# Patient Record
Sex: FEMALE | Race: WHITE | NOT HISPANIC OR LATINO | Employment: FULL TIME | URBAN - METROPOLITAN AREA
[De-identification: names, ages, dates, MRNs, and addresses within clinical notes are randomized per-mention and may not be internally consistent; named-entity substitution may affect disease eponyms.]

---

## 2017-04-18 ENCOUNTER — OFFICE VISIT (OUTPATIENT)
Dept: INTERNAL MEDICINE | Facility: CLINIC | Age: 37
End: 2017-04-18
Payer: COMMERCIAL

## 2017-04-18 VITALS
HEART RATE: 64 BPM | SYSTOLIC BLOOD PRESSURE: 138 MMHG | DIASTOLIC BLOOD PRESSURE: 80 MMHG | HEIGHT: 63 IN | BODY MASS INDEX: 38.87 KG/M2 | WEIGHT: 219.38 LBS

## 2017-04-18 DIAGNOSIS — F41.9 ANXIETY: ICD-10-CM

## 2017-04-18 DIAGNOSIS — Z80.0 FAMILY HISTORY OF COLON CANCER: ICD-10-CM

## 2017-04-18 DIAGNOSIS — Z80.3 FAMILY HISTORY OF BREAST CANCER IN MOTHER: ICD-10-CM

## 2017-04-18 DIAGNOSIS — R73.01 IFG (IMPAIRED FASTING GLUCOSE): ICD-10-CM

## 2017-04-18 DIAGNOSIS — R00.2 PALPITATIONS: Primary | ICD-10-CM

## 2017-04-18 DIAGNOSIS — F32.A DEPRESSION, UNSPECIFIED DEPRESSION TYPE: ICD-10-CM

## 2017-04-18 PROCEDURE — 99214 OFFICE O/P EST MOD 30 MIN: CPT | Mod: S$GLB,,, | Performed by: INTERNAL MEDICINE

## 2017-04-18 PROCEDURE — 1160F RVW MEDS BY RX/DR IN RCRD: CPT | Mod: S$GLB,,, | Performed by: INTERNAL MEDICINE

## 2017-04-18 PROCEDURE — 93010 ELECTROCARDIOGRAM REPORT: CPT | Mod: S$GLB,,, | Performed by: INTERNAL MEDICINE

## 2017-04-18 PROCEDURE — 93005 ELECTROCARDIOGRAM TRACING: CPT | Mod: S$GLB,,, | Performed by: INTERNAL MEDICINE

## 2017-04-18 PROCEDURE — 99999 PR PBB SHADOW E&M-EST. PATIENT-LVL III: CPT | Mod: PBBFAC,,, | Performed by: INTERNAL MEDICINE

## 2017-04-18 RX ORDER — BUPROPION HYDROCHLORIDE 75 MG/1
75 TABLET ORAL 2 TIMES DAILY
Qty: 60 TABLET | Refills: 11 | Status: SHIPPED | OUTPATIENT
Start: 2017-04-18 | End: 2017-09-19

## 2017-04-18 NOTE — PATIENT INSTRUCTIONS
"  Heart Palpitations    Palpitations are the feeling that your heart is beating hard, fast, or irregular. Some describe it as "pounding" or "skipped beats." Palpitations may occur in someone with heart disease, but can also occur in a healthy person.  Heart-related causes:  · Arrhythmia (a change from the heart's normal rhythm)  · Heart valve disease  · Disease of the heart muscle  · Coronary artery disease  · High blood pressure  Non-heart-related causes:  · Certain medicines (such as asthma inhalers and decongestants)  · Some herbal supplements, energy drinks and pills, and weight loss pills  · Illegal stimulant drugs (such as cocaine, crank, methamphetamine, PCP, bath salts, ecstasy)  · Caffeine, alcohol, and tobacco  · Medical conditions such as thyroid disease, anemia, anxiety, and panic disorder  Sometimes the cause can't be found.  Home care  Follow these home care tips:  · Avoid excess caffeine, alcohol, tobacco, and any stimulant drugs.  · Tell your doctor about any prescription or over-the-counter or herbal medicines you take.  Follow-up care  · Follow up with your doctor, or as advised.  Call 911  This is the fastest and safest way to get to the emergency department. The paramedics can also begin treatment on the way to the hospital, if needed.  Don't wait until your symptoms are severe to call 911. These are reasons to call 911:  · Chest pain  · Shortness of breath  · Feeling lightheaded, faint, or dizzy  · Fainting or loss of consciousness  · Very irregular heartbeat  · Rapid heartbeat that makes you uncomfortable  · Slower than usual heart rate associated with symptoms  · Slower than usual heart rate  · Chest pain with weakness, dizziness, heavy sweating, nausea, or vomiting  · Extreme drowsiness or confusion  · Weakness of an arm or leg, or on 1 side of the face  · Difficulty with speech or vision  When to seek medical advice  Get prompt medical attention if you have palpitations and any of the " following:  · Weakness  · Dizziness  · Lightheadedness  · Fainting  Date Last Reviewed: 4/27/2016 © 2000-2016 [employer]. [address] [place] [address], [place] [address]. All rights reserved. This information is not intended as a substitute for professional medical care. Always follow your healthcare professional's instructions.        Obstructive Sleep Apnea  Obstructive sleep apnea is a condition that causes your air passages to become narrowed or blocked during sleep. As a result, breathing stops for short periods. Your body wakes up enough for breathing to begin again, though you don't remember it. The cycle of stopped breathing and brief awakenings can repeat dozens of times a night. This prevents the body from getting to the deeper stages of sleep that are needed for good rest.  Signs of sleep apnea include loud snoring, noisy breathing, and gasping sounds during sleep. Daytime symptoms include waking up tired after a full night's sleep, waking up with headaches, feeling very sleepy or falling asleep during the day, and having problems with memory or concentration.  Risk factors for sleep apnea include:  · Being overweight  · Being a man, or a woman in menopause  · Smoking  · Using alcohol or sedating medications or herbs  · Having enlarged structures in the nose or throat  Home care  Lifestyle changes that can help treat snoring and sleep apnea include the following:  · If you are overweight, lose weight. Talk to your healthcare provider about a weight-loss plan for you.  · Avoid alcohol for 3 to 4 hours before bedtime. Avoid sedating medications. Ask your healthcare provider about the medications you take.  · If you smoke, talk to your healthcare provider about ways to quit.  · Sleep on your side. This can help prevent gravity from pulling relaxed throat tissues into your breathing passages.  · If you have allergies or sinus problems that block your nose, ask your healthcare provider for  help.  Follow up  Follow up with your healthcare provider as advised. A diagnosis of sleep apnea is made with a sleep study. Your healthcare provider can tell you more about this test.  When to seek medical care  Sleep apnea can make you more likely to have certain health problems. These include high blood pressure, heart attack, stroke, and sexual dysfunction. If you have sleep apnea, talk to your healthcare provider about the best treatments for you.  Date Last Reviewed: 5/3/2015  © 2889-0419 [employer]. [address] [place] [id number]. All rights reserved. This information is not intended as a substitute for professional medical care. Always follow your healthcare professional's instructions.        What Are Snoring and Obstructive Sleep Apnea?  If youve ever had a stuffed-up nose, you know the feeling of trying to breathe through a very narrow passageway. This is what happens in your throat when you snore. While you sleep, structures in your throat partially block your air passage, making the passage narrow and hard to breathe through. If the entire passage becomes blocked and you cant breathe at all, you have sleep apnea.     Air moves freely through the nose, mouth and throat.   Snoring  If your throat structures are too large or the muscles relax too much during sleep, the air passage may be partially blocked. As air from the nose or mouth passes around this blockage, the throat structures vibrate, causing the familiar sound of snoring. At times, this sound can be so loud that snorers wake up others, or even themselves, during the night. Snoring gets worse as more and more of the air passage is blocked.     Air is blocked in the back of the mouth and throat.   Obstructive sleep apnea  If the structures completely block the throat, air cant flow to the lungs at all. This is called apnea (meaning no breathing). Since the lungs arent getting fresh air, the brain tells the body to  wake up just enough to tighten the muscles and unblock the air passage. With a loud gasp, breathing begins again. This process may be repeated over and over again throughout the night, making your sleep fragmented with a lighter stage of sleep. Even though you do not remember waking up many times during the night to a lighter sleep, you feel tired the next day. The lack of sleep and fresh air can also strain your lungs, heart, and other organs, leading to problems such as high blood pressure, heart attack, or stroke.     Air may not be able to move freely past a deviated septum or swollen turbinates.   Problems in the nose and jaw  Problems in the structure of the nose may obstruct breathing. A crooked (deviated) septum or swollen turbinates can make snoring worse or lead to apnea. Also, a receding jaw may make the tongue sit too far back, so its more likely to block the airway when youre asleep.        Date Last Reviewed: 7/18/2015  © 9964-3636 [employer]. [address] [place] [address]. All rights reserved. This information is not intended as a substitute for professional medical care. Always follow your healthcare professional's instructions.        Snoring and Sleep Apnea: Notes for a Partner  Snoring and sleep apnea affect your life, as well as your partners. You can help in the treatment of the problem. Be supportive. Encourage your partner both to get treatment and to make the adjustments needed to follow through.    Adjusting to changes  Your partners treatment may involve making changes to certain life habits. You can help your partner make and stick with these changes. For example:  · Support and even join your partners exercise program.  · Be supportive if your partner gets CPAP (continuous positive airway pressure). He or she may feel self-conscious at first. Remind your partner to expect adjustments to CPAP before it feels just right.  · Consider joining a snoring and  sleep apnea support group.  Go along to see the healthcare provider  You can give the healthcare provider the best account of your partners nighttime breathing and snoring patterns. Try to go along to healthcare providers appointments. If you cant go, write notes for your partner to give to the healthcare provider. Describe your partners snoring and sleep breathing patterns in detail.  Tips for sleeping with a snorer  Until treatment takes care of your partners snoring:  · Try to go to bed first. It may help if youre already asleep when your partner starts to snore.  · Wear earplugs to bed. A fan or other source of background noise may also help drown out snoring.   Date Last Reviewed: 8/10/2015  © 3026-3500 [employer]. [address] [place] [address], [place] [address]. All rights reserved. This information is not intended as a substitute for professional medical care. Always follow your healthcare professional's instructions.        Bupropion tablets (Depression/Mood Disorders)  What is this medicine?  BUPROPION (byoo PROE pee on) is used to treat depression.  How should I use this medicine?  Take this medicine by mouth with a glass of water. Follow the directions on the prescription label. You can take it with or without food. If it upsets your stomach, take it with food. Take your medicine at regular intervals. Do not take your medicine more often than directed. Do not stop taking this medicine suddenly except upon the advice of your doctor. Stopping this medicine too quickly may cause serious side effects or your condition may worsen.  A special MedGuide will be given to you by the pharmacist with each prescription and refill. Be sure to read this information carefully each time.  Talk to your pediatrician regarding the use of this medicine in children. Special care may be needed.  What side effects may I notice from receiving this medicine?  Side effects that you should report to your doctor or  health care professional as soon as possible:  · allergic reactions like skin rash, itching or hives, swelling of the face, lips, or tongue  · breathing problems  · changes in vision  · confusion  · fast or irregular heartbeat  · hallucinations  · increased blood pressure  · redness, blistering, peeling or loosening of the skin, including inside the mouth  · seizures  · suicidal thoughts or other mood changes  · unusually weak or tired  · vomiting  Side effects that usually do not require medical attention (report to your doctor or health care professional if they continue or are bothersome):  · change in sex drive or performance  · constipation  · headache  · loss of appetite  · nausea  · tremors  · weight loss  What may interact with this medicine?  Do not take this medicine with any of the following medications:  · linezolid  · MAOIs like Azilect, Carbex, Eldepryl, Marplan, Nardil, and Parnate  · methylene blue (injected into a vein)  · other medicines that contain bupropion like Zyban  This medicine may also interact with the following medications:  · alcohol  · certain medicines for anxiety or sleep  · certain medicines for blood pressure like metoprolol, propranolol  · certain medicines for depression or psychotic disturbances  · certain medicines for HIV or AIDS like efavirenz, lopinavir, nelfinavir, ritonavir  · certain medicines for irregular heart beat like propafenone, flecainide  · certain medicines for Parkinson's disease like amantadine, levodopa  · certain medicines for seizures like carbamazepine, phenytoin, phenobarbital  · cimetidine  · clopidogrel  · cyclophosphamide  · furazolidone  · isoniazid  · nicotine  · orphenadrine  · procarbazine  · steroid medicines like prednisone or cortisone  · stimulant medicines for attention disorders, weight loss, or to stay awake  · tamoxifen  · theophylline  · thiotepa  · ticlopidine  · tramadol  · warfarin  What if I miss a dose?  If you miss a dose, take it as  soon as you can. If it is less than four hours to your next dose, take only that dose and skip the missed dose. Do not take double or extra doses.  Where should I keep my medicine?  Keep out of the reach of children.  Store at room temperature between 15 and 25 degrees C (59 and 77 degrees F), away from direct sunlight and moisture. Keep tightly closed. Throw away any unused medicine after the expiration date.  What should I tell my health care provider before I take this medicine?  They need to know if you have any of these conditions:  · an eating disorder, such as anorexia or bulimia  · bipolar disorder or psychosis  · diabetes or high blood sugar, treated with medication  · glaucoma  · heart disease, previous heart attack, or irregular heart beat  · head injury or brain tumor  · high blood pressure  · kidney or liver disease  · seizures  · suicidal thoughts or a previous suicide attempt  · Tourette's syndrome  · weight loss  · an unusual or allergic reaction to bupropion, other medicines, foods, dyes, or preservatives  · breast-feeding  · pregnant or trying to become pregnant  What should I watch for while using this medicine?  Tell your doctor if your symptoms do not get better or if they get worse. Visit your doctor or health care professional for regular checks on your progress. Because it may take several weeks to see the full effects of this medicine, it is important to continue your treatment as prescribed by your doctor.  Patients and their families should watch out for new or worsening thoughts of suicide or depression. Also watch out for sudden changes in feelings such as feeling anxious, agitated, panicky, irritable, hostile, aggressive, impulsive, severely restless, overly excited and hyperactive, or not being able to sleep. If this happens, especially at the beginning of treatment or after a change in dose, call your health care professional.  Avoid alcoholic drinks while taking this medicine.  Drinking excessive alcoholic beverages, using sleeping or anxiety medicines, or quickly stopping the use of these agents while taking this medicine may increase your risk for a seizure.  Do not drive or use heavy machinery until you know how this medicine affects you. This medicine can impair your ability to perform these tasks.  Do not take this medicine close to bedtime. It may prevent you from sleeping.  Your mouth may get dry. Chewing sugarless gum or sucking hard candy, and drinking plenty of water may help. Contact your doctor if the problem does not go away or is severe.  Date Last Reviewed:   NOTE:This sheet is a summary. It may not cover all possible information. If you have questions about this medicine, talk to your doctor, pharmacist, or health care provider. Copyright© 2016 [employer]

## 2017-04-18 NOTE — PROGRESS NOTES
"Subjective:       Patient ID: Zo Martínez is a 37 y.o. female.    Chief Complaint:  Palpitations/anxiety    Some palpitations of late.  A lot of stress, some depression.    Sometimes feels like heart is "flopping."   This can occur at random times.  May occur daily.  This seems to go away by itself.    Tourette's dx younger, some anxiety and OCD.  Had taken Zoloft in the past and this helped with panic attacks.   Right now does not feel panicky, main sx is sadness and some fatigue.    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  Not happy at work.   Tired, cries daily.  No SI or HI.    Some acne; regular periods.      Sleeps OK- may get up 1-2 x nightly, tosses and turns.   Wakes up tired.    says she snores occasionally but not loud.    IFG.  Also saw family doctor and A1c about 5.9 a few months ago.   Gestational DM with both pregnancies.    Patient Active Problem List   Diagnosis    Transient elevated blood pressure    Anxiety    Obesity, Class I, BMI 30-34.9    Gestational diabetes mellitus    B12 deficiency    Tourette's syndrome    Mild vitamin D deficiency    Family history of colon cancer: father age 55    Family history of breast cancer in mother    Depression    IFG (impaired fasting glucose)     HPI  Review of Systems   Constitutional: Positive for fatigue and unexpected weight change. Negative for activity change, appetite change, chills and fever.   HENT: Negative for ear discharge, nosebleeds, sinus pressure and sore throat.    Eyes: Negative for visual disturbance.   Respiratory: Negative for apnea, cough, chest tightness, shortness of breath and wheezing.    Cardiovascular: Positive for palpitations. Negative for chest pain and leg swelling.   Gastrointestinal: Negative for abdominal distention, abdominal pain, anal bleeding, blood in stool, constipation, diarrhea, nausea and vomiting.   Genitourinary: Negative for dysuria, frequency, hematuria and vaginal bleeding.   Musculoskeletal: Negative for " gait problem, joint swelling and myalgias.   Skin: Negative for rash.   Neurological: Negative for dizziness, tremors, weakness, light-headedness and headaches.   Hematological: Negative for adenopathy. Does not bruise/bleed easily.   Psychiatric/Behavioral: Positive for dysphoric mood. Negative for confusion, hallucinations, sleep disturbance and suicidal ideas. The patient is nervous/anxious.        Objective:      Physical Exam   Constitutional: She is oriented to person, place, and time. She appears well-developed and well-nourished.   HENT:   Head: Normocephalic and atraumatic.   Right Ear: External ear normal.   Left Ear: External ear normal.   Nose: Nose normal.   Mouth/Throat: Oropharynx is clear and moist. No oropharyngeal exudate.   Eyes: Conjunctivae and EOM are normal. No scleral icterus.   Neck: Normal range of motion. Neck supple. No JVD present. No thyromegaly present.   Cardiovascular: Normal rate, regular rhythm, normal heart sounds and intact distal pulses.  Exam reveals no gallop.    No murmur heard.  Pulmonary/Chest: Effort normal and breath sounds normal. No respiratory distress. She has no wheezes. She exhibits no tenderness.   Abdominal: Soft. Bowel sounds are normal. She exhibits no distension and no mass. There is no tenderness. There is no rebound and no guarding.   Musculoskeletal: Normal range of motion. She exhibits no edema or tenderness.   Lymphadenopathy:     She has no cervical adenopathy.   Neurological: She is alert and oriented to person, place, and time. She displays normal reflexes. No cranial nerve deficit. Coordination normal.   Skin: Skin is warm. No rash noted. No erythema.   Psychiatric: She has a normal mood and affect. Her behavior is normal. Judgment and thought content normal.   Nursing note and vitals reviewed.      Assessment:       1. Palpitations    2. Family history of colon cancer: father age 55    3. Family history of breast cancer in mother    4. IFG (impaired  fasting glucose)    5. Anxiety    6. Depression, unspecified depression type        Plan:         Palpitations  -     EKG 12-lead- done in office, NSR no ischemia  -     Holter monitor - 48 hour  -     CBC auto differential; Future; Expected date: 4/18/17  -     Comprehensive metabolic panel; Future; Expected date: 4/18/17  -     TSH; Future; Expected date: 4/18/17    Family history of colon cancer: father age 55: discussed screening age 40-45    Family history of breast cancer in mother: discussed screening    IFG (impaired fasting glucose)  -     Hemoglobin A1c; Future; Expected date: 4/18/17  -     Lipid panel; Future; Expected date: 4/18/17  -     Insulin, random; Future; Expected date: 4/18/17    Depression/anxiety:  Able to contract for safety.  Sleep hygiene, exercise reviewed.  Cautions and side effects of medication discussed.  Follow-up in one month, sooner with problems prior    Other orders  -     buPROPion (WELLBUTRIN) 75 MG tablet; Take 1 tablet (75 mg total) by mouth 2 (two) times daily.  Dispense: 60 tablet; Refill: 11    I will review all studies and determine further tx depending on findings    Patient evaluated for over 30 minutes with this appoinment, including diagnostic testing and treatment.  All questions answered,  chart reviewed and care coordinated.

## 2017-04-18 NOTE — MR AVS SNAPSHOT
[facility]  [address]  [place] [address]  Phone: [phone]  Fax: [phone]                  Zo Martínez   2017 11:30 AM   Office Visit    Description:  Female : 1980   Provider:  Guillerimna Zuniga MD   Department:  [facility]           Diagnoses this Visit        Comments    Palpitations    -  Primary     Family history of colon cancer         Family history of breast cancer in mother         IFG (impaired fasting glucose)                To Do List           Goals (5 Years of Data)     None       These Medications        Disp Refills Start End    buPROPion (WELLBUTRIN) 75 MG tablet 60 tablet 11 2017    Take 1 tablet (75 mg total) by mouth 2 (two) times daily. - Oral    Pharmacy: [id number] - [place], [place] - [address] AT [facility] Ph #: [phone]         [facility] On Call     [facility] On Call Nurse Care Line -  Assistance  Unless otherwise directed by your provider, please contact [facility], our nurse care line that is available for  assistance.     Registered nurses in the [facility] provide: appointment scheduling, clinical advisement, health education, and other advisory services.  Call: [phone] (toll free)               Medications           Message regarding Medications     Verify the changes and/or additions to your medication regime listed below are the same as discussed with your clinician today.  If any of these changes or additions are incorrect, please notify your healthcare provider.        START taking these NEW medications        Refills    buPROPion (WELLBUTRIN) 75 MG tablet 11    Sig: Take 1 tablet (75 mg total) by mouth 2 (two) times daily.    Class: Normal    Route: Oral           Verify that the below list of medications is an accurate representation of the medications you are currently taking.  If none reported, the list may be blank. If  "incorrect, please contact your healthcare provider. Carry this list with you in case of emergency.           Current Medications     buPROPion (WELLBUTRIN) 75 MG tablet Take 1 tablet (75 mg total) by mouth 2 (two) times daily.           Clinical Reference Information           Your Vitals Were     BP Pulse Height Weight Last Period BMI    138/80 64 5' 3" (1.6 m) 99.5 kg (219 lb 5.7 oz) (Exact Date) 38.86 kg/m2      Blood Pressure          Most Recent Value    BP  138/80 [Simultaneous filing. User may not have seen previous data.]      Allergies as of 4/18/2017     No Known Allergies      Immunizations Administered on Date of Encounter - 4/18/2017     None      Orders Placed During Today's Visit      Normal Orders This Visit    EKG 12-lead     Holter monitor - 48 hour     Future Labs/Procedures Expected by Expires    CBC auto differential  4/18/2017 4/18/2018    Comprehensive metabolic panel  4/18/2017 4/18/2018    Hemoglobin A1c  4/18/2017 4/18/2018    Insulin, random  4/18/2017 6/17/2018    Lipid panel  4/18/2017 4/18/2018    TSH  4/18/2017 4/18/2018      Instructions      Heart Palpitations    Palpitations are the feeling that your heart is beating hard, fast, or irregular. Some describe it as "pounding" or "skipped beats." Palpitations may occur in someone with heart disease, but can also occur in a healthy person.  Heart-related causes:  · Arrhythmia (a change from the heart's normal rhythm)  · Heart valve disease  · Disease of the heart muscle  · Coronary artery disease  · High blood pressure  Non-heart-related causes:  · Certain medicines (such as asthma inhalers and decongestants)  · Some herbal supplements, energy drinks and pills, and weight loss pills  · Illegal stimulant drugs (such as cocaine, crank, methamphetamine, PCP, bath salts, ecstasy)  · Caffeine, alcohol, and tobacco  · Medical conditions such as thyroid disease, anemia, anxiety, and panic disorder  Sometimes the cause can't be found.  Home " care  Follow these home care tips:  · Avoid excess caffeine, alcohol, tobacco, and any stimulant drugs.  · Tell your doctor about any prescription or over-the-counter or herbal medicines you take.  Follow-up care  · Follow up with your doctor, or as advised.  Call 911  This is the fastest and safest way to get to the emergency department. The paramedics can also begin treatment on the way to the hospital, if needed.  Don't wait until your symptoms are severe to call 911. These are reasons to call 911:  · Chest pain  · Shortness of breath  · Feeling lightheaded, faint, or dizzy  · Fainting or loss of consciousness  · Very irregular heartbeat  · Rapid heartbeat that makes you uncomfortable  · Slower than usual heart rate associated with symptoms  · Slower than usual heart rate  · Chest pain with weakness, dizziness, heavy sweating, nausea, or vomiting  · Extreme drowsiness or confusion  · Weakness of an arm or leg, or on 1 side of the face  · Difficulty with speech or vision  When to seek medical advice  Get prompt medical attention if you have palpitations and any of the following:  · Weakness  · Dizziness  · Lightheadedness  · Fainting  Date Last Reviewed: 4/27/2016  © 8289-8339 [employer]. [address] [place] [address], [place] [address]. All rights reserved. This information is not intended as a substitute for professional medical care. Always follow your healthcare professional's instructions.        Obstructive Sleep Apnea  Obstructive sleep apnea is a condition that causes your air passages to become narrowed or blocked during sleep. As a result, breathing stops for short periods. Your body wakes up enough for breathing to begin again, though you don't remember it. The cycle of stopped breathing and brief awakenings can repeat dozens of times a night. This prevents the body from getting to the deeper stages of sleep that are needed for good rest.  Signs of sleep apnea include loud snoring, noisy  breathing, and gasping sounds during sleep. Daytime symptoms include waking up tired after a full night's sleep, waking up with headaches, feeling very sleepy or falling asleep during the day, and having problems with memory or concentration.  Risk factors for sleep apnea include:  · Being overweight  · Being a man, or a woman in menopause  · Smoking  · Using alcohol or sedating medications or herbs  · Having enlarged structures in the nose or throat  Home care  Lifestyle changes that can help treat snoring and sleep apnea include the following:  · If you are overweight, lose weight. Talk to your healthcare provider about a weight-loss plan for you.  · Avoid alcohol for 3 to 4 hours before bedtime. Avoid sedating medications. Ask your healthcare provider about the medications you take.  · If you smoke, talk to your healthcare provider about ways to quit.  · Sleep on your side. This can help prevent gravity from pulling relaxed throat tissues into your breathing passages.  · If you have allergies or sinus problems that block your nose, ask your healthcare provider for help.  Follow up  Follow up with your healthcare provider as advised. A diagnosis of sleep apnea is made with a sleep study. Your healthcare provider can tell you more about this test.  When to seek medical care  Sleep apnea can make you more likely to have certain health problems. These include high blood pressure, heart attack, stroke, and sexual dysfunction. If you have sleep apnea, talk to your healthcare provider about the best treatments for you.  Date Last Reviewed: 5/3/2015  © 6426-6903 [employer]. [address] [place] [address], [place] [address]. All rights reserved. This information is not intended as a substitute for professional medical care. Always follow your healthcare professional's instructions.        What Are Snoring and Obstructive Sleep Apnea?  If youve ever had a stuffed-up nose, you know the feeling of trying to breathe  through a very narrow passageway. This is what happens in your throat when you snore. While you sleep, structures in your throat partially block your air passage, making the passage narrow and hard to breathe through. If the entire passage becomes blocked and you cant breathe at all, you have sleep apnea.     Air moves freely through the nose, mouth and throat.   Snoring  If your throat structures are too large or the muscles relax too much during sleep, the air passage may be partially blocked. As air from the nose or mouth passes around this blockage, the throat structures vibrate, causing the familiar sound of snoring. At times, this sound can be so loud that snorers wake up others, or even themselves, during the night. Snoring gets worse as more and more of the air passage is blocked.     Air is blocked in the back of the mouth and throat.   Obstructive sleep apnea  If the structures completely block the throat, air cant flow to the lungs at all. This is called apnea (meaning no breathing). Since the lungs arent getting fresh air, the brain tells the body to wake up just enough to tighten the muscles and unblock the air passage. With a loud gasp, breathing begins again. This process may be repeated over and over again throughout the night, making your sleep fragmented with a lighter stage of sleep. Even though you do not remember waking up many times during the night to a lighter sleep, you feel tired the next day. The lack of sleep and fresh air can also strain your lungs, heart, and other organs, leading to problems such as high blood pressure, heart attack, or stroke.     Air may not be able to move freely past a deviated septum or swollen turbinates.   Problems in the nose and jaw  Problems in the structure of the nose may obstruct breathing. A crooked (deviated) septum or swollen turbinates can make snoring worse or lead to apnea. Also, a receding jaw may make the tongue sit too far back, so its more  likely to block the airway when youre asleep.        Date Last Reviewed: 7/18/2015  © 5806-0408 [employer]. [address] [place] [address]. All rights reserved. This information is not intended as a substitute for professional medical care. Always follow your healthcare professional's instructions.        Snoring and Sleep Apnea: Notes for a Partner  Snoring and sleep apnea affect your life, as well as your partners. You can help in the treatment of the problem. Be supportive. Encourage your partner both to get treatment and to make the adjustments needed to follow through.    Adjusting to changes  Your partners treatment may involve making changes to certain life habits. You can help your partner make and stick with these changes. For example:  · Support and even join your partners exercise program.  · Be supportive if your partner gets CPAP (continuous positive airway pressure). He or she may feel self-conscious at first. Remind your partner to expect adjustments to CPAP before it feels just right.  · Consider joining a snoring and sleep apnea support group.  Go along to see the healthcare provider  You can give the healthcare provider the best account of your partners nighttime breathing and snoring patterns. Try to go along to healthcare providers appointments. If you cant go, write notes for your partner to give to the healthcare provider. Describe your partners snoring and sleep breathing patterns in detail.  Tips for sleeping with a snorer  Until treatment takes care of your partners snoring:  · Try to go to bed first. It may help if youre already asleep when your partner starts to snore.  · Wear earplugs to bed. A fan or other source of background noise may also help drown out snoring.   Date Last Reviewed: 8/10/2015  © 4523-5634 [employer]. [address] [place] [address]. All rights reserved. This information is not intended as a substitute for  professional medical care. Always follow your healthcare professional's instructions.        Bupropion tablets (Depression/Mood Disorders)  What is this medicine?  BUPROPION (byoo PROE pee on) is used to treat depression.  How should I use this medicine?  Take this medicine by mouth with a glass of water. Follow the directions on the prescription label. You can take it with or without food. If it upsets your stomach, take it with food. Take your medicine at regular intervals. Do not take your medicine more often than directed. Do not stop taking this medicine suddenly except upon the advice of your doctor. Stopping this medicine too quickly may cause serious side effects or your condition may worsen.  A special MedGuide will be given to you by the pharmacist with each prescription and refill. Be sure to read this information carefully each time.  Talk to your pediatrician regarding the use of this medicine in children. Special care may be needed.  What side effects may I notice from receiving this medicine?  Side effects that you should report to your doctor or health care professional as soon as possible:  · allergic reactions like skin rash, itching or hives, swelling of the face, lips, or tongue  · breathing problems  · changes in vision  · confusion  · fast or irregular heartbeat  · hallucinations  · increased blood pressure  · redness, blistering, peeling or loosening of the skin, including inside the mouth  · seizures  · suicidal thoughts or other mood changes  · unusually weak or tired  · vomiting  Side effects that usually do not require medical attention (report to your doctor or health care professional if they continue or are bothersome):  · change in sex drive or performance  · constipation  · headache  · loss of appetite  · nausea  · tremors  · weight loss  What may interact with this medicine?  Do not take this medicine with any of the following medications:  · linezolid  · MAOIs like Azilect, Carbex,  Eldepryl, Marplan, Nardil, and Parnate  · methylene blue (injected into a vein)  · other medicines that contain bupropion like Zyban  This medicine may also interact with the following medications:  · alcohol  · certain medicines for anxiety or sleep  · certain medicines for blood pressure like metoprolol, propranolol  · certain medicines for depression or psychotic disturbances  · certain medicines for HIV or AIDS like efavirenz, lopinavir, nelfinavir, ritonavir  · certain medicines for irregular heart beat like propafenone, flecainide  · certain medicines for Parkinson's disease like amantadine, levodopa  · certain medicines for seizures like carbamazepine, phenytoin, phenobarbital  · cimetidine  · clopidogrel  · cyclophosphamide  · furazolidone  · isoniazid  · nicotine  · orphenadrine  · procarbazine  · steroid medicines like prednisone or cortisone  · stimulant medicines for attention disorders, weight loss, or to stay awake  · tamoxifen  · theophylline  · thiotepa  · ticlopidine  · tramadol  · warfarin  What if I miss a dose?  If you miss a dose, take it as soon as you can. If it is less than four hours to your next dose, take only that dose and skip the missed dose. Do not take double or extra doses.  Where should I keep my medicine?  Keep out of the reach of children.  Store at room temperature between 15 and 25 degrees C (59 and 77 degrees F), away from direct sunlight and moisture. Keep tightly closed. Throw away any unused medicine after the expiration date.  What should I tell my health care provider before I take this medicine?  They need to know if you have any of these conditions:  · an eating disorder, such as anorexia or bulimia  · bipolar disorder or psychosis  · diabetes or high blood sugar, treated with medication  · glaucoma  · heart disease, previous heart attack, or irregular heart beat  · head injury or brain tumor  · high blood pressure  · kidney or liver disease  · seizures  · suicidal  thoughts or a previous suicide attempt  · Tourette's syndrome  · weight loss  · an unusual or allergic reaction to bupropion, other medicines, foods, dyes, or preservatives  · breast-feeding  · pregnant or trying to become pregnant  What should I watch for while using this medicine?  Tell your doctor if your symptoms do not get better or if they get worse. Visit your doctor or health care professional for regular checks on your progress. Because it may take several weeks to see the full effects of this medicine, it is important to continue your treatment as prescribed by your doctor.  Patients and their families should watch out for new or worsening thoughts of suicide or depression. Also watch out for sudden changes in feelings such as feeling anxious, agitated, panicky, irritable, hostile, aggressive, impulsive, severely restless, overly excited and hyperactive, or not being able to sleep. If this happens, especially at the beginning of treatment or after a change in dose, call your health care professional.  Avoid alcoholic drinks while taking this medicine. Drinking excessive alcoholic beverages, using sleeping or anxiety medicines, or quickly stopping the use of these agents while taking this medicine may increase your risk for a seizure.  Do not drive or use heavy machinery until you know how this medicine affects you. This medicine can impair your ability to perform these tasks.  Do not take this medicine close to bedtime. It may prevent you from sleeping.  Your mouth may get dry. Chewing sugarless gum or sucking hard candy, and drinking plenty of water may help. Contact your doctor if the problem does not go away or is severe.  Date Last Reviewed:   NOTE:This sheet is a summary. It may not cover all possible information. If you have questions about this medicine, talk to your doctor, pharmacist, or health care provider. Copyright© 2016 [employer]             Language Assistance Services     ATTENTION:  Language assistance services are available, free of charge. Please call [phone].      ATENCIÓN: Si habla reeseañol, tiene a tracy disposición servicios gratuitos de asistencia lingüística. Llame al [phone].     CHÚ Ý: N?u b?n nói Ti?ng Vi?t, có các d?ch v? h? tr? ngôn ng? mi?n phí dành cho b?n. G?i s? [phone].         [facility] complies with applicable Federal civil rights laws and does not discriminate on the basis of race, color, national origin, age, disability, or sex.

## 2017-04-24 ENCOUNTER — APPOINTMENT (OUTPATIENT)
Dept: ELECTROPHYSIOLOGY | Facility: CLINIC | Age: 37
End: 2017-04-24
Payer: COMMERCIAL

## 2017-04-24 ENCOUNTER — PATIENT MESSAGE (OUTPATIENT)
Dept: INTERNAL MEDICINE | Facility: CLINIC | Age: 37
End: 2017-04-24

## 2017-04-24 PROCEDURE — 93224 XTRNL ECG REC UP TO 48 HRS: CPT | Mod: S$GLB,,, | Performed by: INTERNAL MEDICINE

## 2017-04-27 ENCOUNTER — PATIENT MESSAGE (OUTPATIENT)
Dept: INTERNAL MEDICINE | Facility: CLINIC | Age: 37
End: 2017-04-27

## 2017-04-27 ENCOUNTER — TELEPHONE (OUTPATIENT)
Dept: INTERNAL MEDICINE | Facility: CLINIC | Age: 37
End: 2017-04-27

## 2017-04-27 DIAGNOSIS — I49.3 PVC'S (PREMATURE VENTRICULAR CONTRACTIONS): Primary | ICD-10-CM

## 2017-04-27 NOTE — TELEPHONE ENCOUNTER
Please let her know that her 24-hour Holter was acceptable but she does have a lot of premature beats.  These do not look worrisome, but I am sure they're causing her symptoms.  I would like for her to be seen in the arrhythmia clinic and I have placed a referral for this.  Please assist with appointment and let me know when it is scheduled.  Thank you

## 2017-04-28 NOTE — TELEPHONE ENCOUNTER
Spoke to pt and advised of results. Scheduled pt appt for 5/2/17 with Dr. Martínez in arrhythmia.

## 2017-04-29 DIAGNOSIS — I49.3 PVCS (PREMATURE VENTRICULAR CONTRACTIONS): Primary | ICD-10-CM

## 2017-05-02 ENCOUNTER — CLINICAL SUPPORT (OUTPATIENT)
Dept: ELECTROPHYSIOLOGY | Facility: CLINIC | Age: 37
End: 2017-05-02
Payer: COMMERCIAL

## 2017-05-02 ENCOUNTER — HOSPITAL ENCOUNTER (OUTPATIENT)
Dept: CARDIOLOGY | Facility: CLINIC | Age: 37
Discharge: HOME | End: 2017-05-02
Payer: COMMERCIAL

## 2017-05-02 ENCOUNTER — INITIAL CONSULT (OUTPATIENT)
Dept: ELECTROPHYSIOLOGY | Facility: CLINIC | Age: 37
End: 2017-05-02
Payer: COMMERCIAL

## 2017-05-02 VITALS
DIASTOLIC BLOOD PRESSURE: 94 MMHG | HEIGHT: 64 IN | BODY MASS INDEX: 36.96 KG/M2 | SYSTOLIC BLOOD PRESSURE: 146 MMHG | HEART RATE: 73 BPM | WEIGHT: 216.5 LBS

## 2017-05-02 DIAGNOSIS — R00.2 PALPITATIONS: ICD-10-CM

## 2017-05-02 DIAGNOSIS — I49.1 PREMATURE ATRIAL CONTRACTIONS: Chronic | ICD-10-CM

## 2017-05-02 DIAGNOSIS — I49.3 PVCS (PREMATURE VENTRICULAR CONTRACTIONS): ICD-10-CM

## 2017-05-02 DIAGNOSIS — R07.9 CHEST PAIN, UNSPECIFIED TYPE: Primary | ICD-10-CM

## 2017-05-02 PROCEDURE — 99999 PR PBB SHADOW E&M-EST. PATIENT-LVL III: CPT | Mod: PBBFAC,,, | Performed by: INTERNAL MEDICINE

## 2017-05-02 PROCEDURE — 0296T HOLTER MONITOR - 3-14 DAY ADULT: CPT | Mod: ,,, | Performed by: INTERNAL MEDICINE

## 2017-05-02 PROCEDURE — 93000 ELECTROCARDIOGRAM COMPLETE: CPT | Mod: S$GLB,,, | Performed by: INTERNAL MEDICINE

## 2017-05-02 PROCEDURE — 0298T HOLTER MONITOR - 3-14 DAY ADULT: CPT | Mod: ,,, | Performed by: INTERNAL MEDICINE

## 2017-05-02 PROCEDURE — 99204 OFFICE O/P NEW MOD 45 MIN: CPT | Mod: S$GLB,,, | Performed by: INTERNAL MEDICINE

## 2017-05-02 PROCEDURE — 1160F RVW MEDS BY RX/DR IN RCRD: CPT | Mod: S$GLB,,, | Performed by: INTERNAL MEDICINE

## 2017-05-02 NOTE — PROGRESS NOTES
"Subjective:    Patient ID:  Zo Martínez is a 37 y.o. female who presents for evaluation of Palpitations    Referring Physician: Guillermina Zuniga MD    HPI  I had the pleasure of seeing Mrs. Martínez today in our electrophysiology clinic in consultation for her palpitations and chest pain.  As you are aware she is a pleasant 37 year-old woman with long-history of palpitations, depression, and obesity.  She has noted for years to have episodes of skipped beats that occur without an identifiable trigger.  Some days are better than other days.  They are associated with a "sinking" or "dropping" sensation of her heart in her chest.  She has cut out the caffeine from her diet which has not effected her symptoms.  She also has occasional episodes of more sustained palpitations that last for a bit but <30 seconds.  Then recently she had an episode of squeezing/pushing chest pressure in the center of her chest while driving with associated shortness of breath that lasted for ~30 seconds.  A 48 hour holter monitor was performed.  Day 1 revealed 1 PAC.  Day 2 revealed 1108 PACs.  She noted that neither day was truly representative of her typical symptom burden.  Moreover she did not have any of the more "sustained" episodes while wearing the monitor.  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 She reports having early blood pressure in her family but does not believe anyone had a heart attack at a young age. She is a lifelong non-smoker. She is not physically active however wants to start exercising and lose weight.    An echocardiogram performed in 2013 was normal.  ECHO 11/2013 CONCLUSIONS     1 - Normal left ventricular systolic function (EF 60-65%).     2 - Normal right ventricular systolic function .     3 - Normal left ventricular diastolic function.    My interpretation of today's in clinic electrocardiogram is sinus rhythm with a rate of 73 bpm, normal intervals/qrs, and no " ectopy.    Review of Systems   Constitution: Negative. Negative for weakness and malaise/fatigue.   HENT: Negative.  Negative for congestion and headaches.    Eyes: Negative.    Cardiovascular: Positive for chest pain (per HPI), irregular heartbeat and palpitations. Negative for dyspnea on exertion, leg swelling, near-syncope, orthopnea, paroxysmal nocturnal dyspnea and syncope.   Respiratory: Negative.    Endocrine: Negative.    Hematologic/Lymphatic: Negative.    Skin: Negative.    Musculoskeletal: Negative.    Genitourinary: Negative.    Neurological: Negative.    Psychiatric/Behavioral: Negative.         Objective:    Physical Exam   Constitutional: She is oriented to person, place, and time. She appears well-developed and well-nourished. No distress.   HENT:   Head: Normocephalic and atraumatic.   Eyes: Conjunctivae are normal. Right eye exhibits no discharge. Left eye exhibits no discharge.   Neck: Neck supple. No JVD present.   Cardiovascular: Normal rate, regular rhythm and normal heart sounds.  Exam reveals no gallop and no friction rub.    No murmur heard.  Pulmonary/Chest: Effort normal and breath sounds normal. No respiratory distress. She has no wheezes. She has no rales.   Abdominal: Soft. Bowel sounds are normal. She exhibits no distension. There is no tenderness.   Musculoskeletal: She exhibits no edema or tenderness.   Neurological: She is alert and oriented to person, place, and time.   Skin: Skin is warm and dry. She is not diaphoretic.   Psychiatric: She has a normal mood and affect. Her behavior is normal. Judgment and thought content normal.   Vitals reviewed.        Assessment:       1. Chest pain, unspecified type    2. Premature atrial contractions    3. Palpitations         Plan:       In summary, Mrs. Martínez is a pleasant 37 year-old woman with long-history of palpitations, depression, and obesity presenting for palpitations and chest discomfort.  Very likely most of her symptoms are  "premature atrial contractions however she reports having less frequent episodes of a more "sustained" palpitation at times.  These can wake her from sleep.  I do not know if this in particular could be a higher frequency of a paroxysm of PACs vs a truly sustained dysrhythmia (i.e. Atrial tachycardia vs circus movement tach like AVNRT).  I would like for her to wear a longer monitor to capture these events as they do not occur daily.  Furthermore will perform an exercise stress echo to evaluate her episode of chest discomfort, although my suspicion for coronary disease is low.   Ultimately with regards to her PACs I provided her reassurance and indicated treatment is generally symptom based, can continue with watchful waiting vs medical therapy if symptoms warrant it (with either beta or calcium channel blocker).  She understood and indicated if this is all we fine at the moment she would not want to start a medication. I also indicated to her that if the studies we are ordering are cost prohibitive with her insurance/deductible then to contact me and we can order alternative tests.    Plan  ZIO patch (3-14 day monitor)  Exercise stress echo    Will follow-up on the phone with test results.  Otherwise RTC with me in 3 months    Thank you for allowing me to participate in the care of this patient. Please do not hesitate to call me with any questions or concerns.    Chance Martínez MD, PhD  Cardiac Electrophysiology              "

## 2017-05-02 NOTE — MR AVS SNAPSHOT
[facility]  [address]  [facility] [address]  Phone: [phone]  Fax: [phone]                  Zo Martínez   2017 10:00 AM   Initial consult    Description:  Female : 1980   Provider:  Chance Martínez MD   Department:  [facility]           Reason for Visit     Palpitations           Diagnoses this Visit        Comments    Chest pain, unspecified type    -  Primary     Premature atrial contractions         Palpitations                To Do List           Future Appointments        Provider Department Dept Phone    2017 1:00 PM EXTENDED HOLTER, [facility] [phone]    2017 11:00 AM EXERCISE, STRESS ECHO [facility] Echo/Stress Lab [phone]    2017 9:00 AM EKG, APPT [facility] - [phone]    2017 9:20 AM MD [facility] [phone]      Goals (5 Years of Data)     None      Follow-Up and Disposition     Return in about 3 months (around 2017).    Follow-up and Disposition History      [facility] On [facility]     [facility] On Call Nurse Care Line -  Assistance  Unless otherwise directed by your provider, please contact [facility] On-Call, our nurse care line that is available for  assistance.     Registered nurses in the [facility] On Call Center provide: appointment scheduling, clinical advisement, health education, and other advisory services.  Call: [phone] (toll free)               Medications           Message regarding Medications     Verify the changes and/or additions to your medication regime listed below are the same as discussed with your clinician today.  If any of these changes or additions are incorrect, please notify your healthcare provider.             Verify that the below list of medications is an accurate representation of the medications you are currently taking.  If none reported, the list may be blank. If incorrect, please contact your healthcare provider. Carry this  "list with you in case of emergency.           Current Medications     ACCU-CHEK FASTCLIX Misc     buPROPion (WELLBUTRIN) 75 MG tablet Take 1 tablet (75 mg total) by mouth 2 (two) times daily.           Clinical Reference Information           Your Vitals Were     BP Pulse Height Weight Last Period BMI    146/94 73 5' 4" (1.626 m) 98.2 kg (216 lb 7.9 oz) 04/04/2017 37.16 kg/m2      Blood Pressure          Most Recent Value    BP  (!)  146/94      Allergies as of 5/2/2017     No Known Allergies      Immunizations Administered on Date of Encounter - 5/2/2017     None      Orders Placed During Today's Visit     Future Labs/Procedures Expected by Expires    Exercise stress echo with color flow  As directed 5/2/2018    Holter Monitor - 3-14 Day Adult (zio patch)  As directed 5/2/2018      Language Assistance Services     ATTENTION: Language assistance services are available, free of charge. Please call [phone].      ATENCIÓN: Si habla español, tiene a tracy disposición servicios gratuitos de asistencia lingüística. Llame al [phone].     CHÚ Ý: N?u b?n nói Ti?ng Vi?t, có các d?ch v? h? tr? ngôn ng? mi?n phí dành cho b?n. G?i s? [phone].         [facility] complies with applicable Federal civil rights laws and does not discriminate on the basis of race, color, national origin, age, disability, or sex.        "

## 2017-05-02 NOTE — LETTER
May 2, 2017      Guillermina Zuniga MD  [address]  [facility] [address]           [facility]  [address]  [facility] [address]  Phone: [phone]  Fax: [phone]          Patient: Zo Martínez   MR Number: [id number]   YOB: 1980   Date of Visit: 5/2/2017       Dear Dr. Guillermina Zuniga:    Thank you for referring Zo Martínez to me for evaluation. Attached you will find relevant portions of my assessment and plan of care.    If you have questions, please do not hesitate to call me. I look forward to following Zo Martínez along with you.    Sincerely,    Chance Martínez MD    Enclosure  CC:  No Recipients    If you would like to receive this communication electronically, please contact [email or web address] or [phone] to request more information on [employer] access.    For providers and/or their staff who would like to refer a patient to [facility], please contact us through our one-stop-shop provider referral line, [facility] [place], at [phone].    If you feel you have received this communication in error or would no longer like to receive these types of communications, please e-mail [email or web address]

## 2017-06-13 ENCOUNTER — TELEPHONE (OUTPATIENT)
Dept: ELECTROPHYSIOLOGY | Facility: CLINIC | Age: 37
End: 2017-06-13

## 2017-06-13 RX ORDER — METOPROLOL SUCCINATE 25 MG/1
25 TABLET, EXTENDED RELEASE ORAL DAILY
Qty: 30 TABLET | Refills: 11 | Status: SHIPPED | OUTPATIENT
Start: 2017-06-13 | End: 2017-06-14 | Stop reason: ALTCHOICE

## 2017-06-13 NOTE — TELEPHONE ENCOUNTER
Discussed Zio patch results with patient.  Had rare PACs/PVCs, one episode of nonsustained AT, and 1 4 beat run of wide complex tachycardia.  Her stress echo is scheduled to be done next month.  Recommend starting low dose betablocker.  If stress test is normal then discussed the 4 beat wide complex tachycardia does not portend a worse outcome.  Patient is leaving to go out of town and states she cannot get the stress test sooner.

## 2017-06-14 ENCOUNTER — TELEPHONE (OUTPATIENT)
Dept: ELECTROPHYSIOLOGY | Facility: CLINIC | Age: 37
End: 2017-06-14

## 2017-06-14 ENCOUNTER — PATIENT MESSAGE (OUTPATIENT)
Dept: ELECTROPHYSIOLOGY | Facility: CLINIC | Age: 37
End: 2017-06-14

## 2017-06-14 DIAGNOSIS — I49.1 PREMATURE ATRIAL CONTRACTIONS: Primary | Chronic | ICD-10-CM

## 2017-06-14 RX ORDER — ATENOLOL 25 MG/1
25 TABLET ORAL DAILY
Qty: 30 TABLET | Refills: 11 | Status: SHIPPED | OUTPATIENT
Start: 2017-06-14 | End: 2017-07-26 | Stop reason: ALTCHOICE

## 2017-06-14 NOTE — TELEPHONE ENCOUNTER
Patient called concerning potential drug-drug interaction between metoprolol and bupropion, can get increased concentration of metoprolol with recommendation to reduce dose and monitor if needed to be on it.  Discussed with her and will change to atenolol which has no known interaction.  She understood.

## 2017-07-20 ENCOUNTER — TELEPHONE (OUTPATIENT)
Dept: ELECTROPHYSIOLOGY | Facility: CLINIC | Age: 37
End: 2017-07-20

## 2017-07-20 ENCOUNTER — HOSPITAL ENCOUNTER (OUTPATIENT)
Dept: CARDIOLOGY | Facility: CLINIC | Age: 37
Discharge: HOME | End: 2017-07-20
Payer: COMMERCIAL

## 2017-07-20 ENCOUNTER — DOCUMENTATION ONLY (OUTPATIENT)
Dept: CARDIOLOGY | Facility: CLINIC | Age: 37
End: 2017-07-20

## 2017-07-20 DIAGNOSIS — R07.9 CHEST PAIN, UNSPECIFIED TYPE: ICD-10-CM

## 2017-07-20 DIAGNOSIS — R00.2 PALPITATIONS: ICD-10-CM

## 2017-07-20 LAB
DIASTOLIC DYSFUNCTION: NO
ESTIMATED PA SYSTOLIC PRESSURE: 23.25
RETIRED EF AND QEF - SEE NOTES: 65 (ref 55–65)

## 2017-07-20 PROCEDURE — 93320 DOPPLER ECHO COMPLETE: CPT | Mod: S$GLB,,, | Performed by: INTERNAL MEDICINE

## 2017-07-20 PROCEDURE — 93351 STRESS TTE COMPLETE: CPT | Mod: S$GLB,,, | Performed by: INTERNAL MEDICINE

## 2017-07-20 PROCEDURE — 93352 ADMIN ECG CONTRAST AGENT: CPT | Mod: S$GLB,,, | Performed by: INTERNAL MEDICINE

## 2017-07-20 PROCEDURE — 93325 DOPPLER ECHO COLOR FLOW MAPG: CPT | Mod: S$GLB,,, | Performed by: INTERNAL MEDICINE

## 2017-07-26 ENCOUNTER — OFFICE VISIT (OUTPATIENT)
Dept: ELECTROPHYSIOLOGY | Facility: CLINIC | Age: 37
End: 2017-07-26
Payer: COMMERCIAL

## 2017-07-26 VITALS
DIASTOLIC BLOOD PRESSURE: 76 MMHG | HEIGHT: 64 IN | BODY MASS INDEX: 36.96 KG/M2 | SYSTOLIC BLOOD PRESSURE: 124 MMHG | HEART RATE: 90 BPM | WEIGHT: 216.5 LBS

## 2017-07-26 DIAGNOSIS — I49.1 PREMATURE ATRIAL CONTRACTIONS: Primary | Chronic | ICD-10-CM

## 2017-07-26 DIAGNOSIS — I49.3 PVCS (PREMATURE VENTRICULAR CONTRACTIONS): ICD-10-CM

## 2017-07-26 DIAGNOSIS — R00.2 PALPITATIONS: ICD-10-CM

## 2017-07-26 DIAGNOSIS — I10 ESSENTIAL HYPERTENSION: Chronic | ICD-10-CM

## 2017-07-26 PROCEDURE — 93000 ELECTROCARDIOGRAM COMPLETE: CPT | Mod: S$GLB,,, | Performed by: INTERNAL MEDICINE

## 2017-07-26 PROCEDURE — 99999 PR PBB SHADOW E&M-EST. PATIENT-LVL III: CPT | Mod: PBBFAC,,, | Performed by: INTERNAL MEDICINE

## 2017-07-26 PROCEDURE — 99213 OFFICE O/P EST LOW 20 MIN: CPT | Mod: S$GLB,,, | Performed by: INTERNAL MEDICINE

## 2017-07-26 RX ORDER — DILTIAZEM HYDROCHLORIDE 120 MG/1
120 CAPSULE, EXTENDED RELEASE ORAL DAILY
Qty: 30 CAPSULE | Refills: 11 | Status: SHIPPED | OUTPATIENT
Start: 2017-07-26 | End: 2017-09-08 | Stop reason: SINTOL

## 2017-07-26 NOTE — PROGRESS NOTES
"Subjective:    Patient ID:  Zo Martínez is a 37 y.o. female who presents for follow-up of Palpitations    Referring Physician: Guillermina Zuniga MD    HPI    Prior Hx:  I had the pleasure of seeing Mrs. Martínez today in our electrophysiology clinic in consultation for her palpitations and chest pain.  As you are aware she is a pleasant 37 year-old woman with long-history of palpitations, hypertension, depression, and obesity.  She has noted for years to have episodes of skipped beats that occur without an identifiable trigger.  Some days are better than other days.  They are associated with a "sinking" or "dropping" sensation of her heart in her chest.  She has cut out the caffeine from her diet which has not effected her symptoms.  She also has occasional episodes of more sustained palpitations that last for a bit but <30 seconds.  Then recently she had an episode of squeezing/pushing chest pressure in the center of her chest while driving with associated shortness of breath that lasted for ~30 seconds.  A 48 hour holter monitor was performed.  Day 1 revealed 1 PAC.  Day 2 revealed 1108 PACs.  She noted that neither day was truly representative of her typical symptom burden.  Moreover she did not have any of the more "sustained" episodes while wearing the monitor.  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 She reports having early blood pressure in her family but does not believe anyone had a heart attack at a young age. She is a lifelong non-smoker. She is not physically active however wants to start exercising and lose weight.     An echocardiogram performed in 2013 was normal.  ECHO 11/2013 CONCLUSIONS     1 - Normal left ventricular systolic function (EF 60-65%).     2 - Normal right ventricular systolic function .     3 - Normal left ventricular diastolic function.    Interim Hx:  Mrs. Martínez elected to wear a 14 day ZIO patch which noted rare PACS/PVCS, 1 6 beat run of AT, " and 1 4 beat run of WCT, likely non-sustained VT.  There were however 49 patient triggered events with the majority correlating to sinus rhythm. We rx her atenolol as metoprolol had an interaction with bupropion. She had a treadmill exercise stress echo that showed a structurally normal heart without any ischemic changes. She presents for follow-up. She reports feeling much better on atenolol and has very few palpitations. She reports she and her  do not prevent pregnancy.    My interpretation of today's in clinic electrocardiogram is normal sinus rhythm.    Review of Systems   Constitution: Negative for weakness and malaise/fatigue.   HENT: Positive for headaches (improved with atenolol).    Eyes: Negative.    Cardiovascular: Positive for palpitations (improved). Negative for chest pain, dyspnea on exertion and irregular heartbeat.   Respiratory: Negative.    Hematologic/Lymphatic: Negative.    Skin: Negative.    Musculoskeletal: Negative.    Gastrointestinal: Negative for bloating and abdominal pain.   Neurological: Negative for dizziness, focal weakness and light-headedness.   Psychiatric/Behavioral: The patient is nervous/anxious.         Objective:    Physical Exam   Constitutional: She is oriented to person, place, and time. She appears well-developed and well-nourished. No distress.   HENT:   Head: Normocephalic and atraumatic.   Eyes: Conjunctivae are normal. Right eye exhibits no discharge. Left eye exhibits no discharge.   Neck: Neck supple. No JVD present.   Cardiovascular: Normal rate, regular rhythm, normal heart sounds and intact distal pulses.  Exam reveals no gallop and no friction rub.    No murmur heard.  Pulmonary/Chest: Effort normal and breath sounds normal. No respiratory distress. She has no wheezes. She has no rales.   Abdominal: Soft. Bowel sounds are normal. She exhibits no distension. There is no tenderness. There is no rebound.   Musculoskeletal: She exhibits no edema.    Neurological: She is alert and oriented to person, place, and time.   Skin: Skin is warm and dry. She is not diaphoretic.   Psychiatric: She has a normal mood and affect. Her behavior is normal. Judgment and thought content normal.   Vitals reviewed.        Assessment:       1. Premature atrial contractions    2. Palpitations    3. Essential hypertension         Plan:       In summary, Mrs. Martínez is a pleasant 37 year-old woman with long-history of palpitations, depression, and obesity presenting for palpitations and chest discomfort.  Most of her symptoms correlate to sinus rhythm on her monitor.  However some may be attirubted to PACs/PVCs. She had 1 4 beat run of likely NSVT on her monitor. Her exercise stress echo is unremarkable for ischemia. Discussed that normal heart NSVT generally does not portend a higher morbidity. We discussed pregnancy risk and beta-blocker therapy. Atenolol is class D. Metoprolol is class C however she desires to continue Wellbutrin. I offered her assurance and option to stop atenolol and other drugs for her symptoms. She prefers continued therapy. Will switch to diltiazem, which is class C pregnancy risk category. She understands there is some risk with taking this and fetal harm if she were to have an unplanned pregnancy. She however also has hypertension which is improved on atenolol and likely will improve with diltiazem.  RTC in 1 year, sooner if needed.    Plan  Stop atenolol  Start diltiazem 120mg daily (long-acting)    Thank you for allowing me to participate in the care of this patient. Please do not hesitate to call me with any questions or concerns.    Chance Martínez MD, PhD  Cardiac Electrophysiology

## 2017-08-17 ENCOUNTER — PATIENT MESSAGE (OUTPATIENT)
Dept: ELECTROPHYSIOLOGY | Facility: CLINIC | Age: 37
End: 2017-08-17

## 2017-08-17 ENCOUNTER — PATIENT MESSAGE (OUTPATIENT)
Dept: INTERNAL MEDICINE | Facility: CLINIC | Age: 37
End: 2017-08-17

## 2017-08-17 DIAGNOSIS — E87.5 HYPERKALEMIA: Primary | ICD-10-CM

## 2017-08-18 ENCOUNTER — PATIENT MESSAGE (OUTPATIENT)
Dept: INTERNAL MEDICINE | Facility: CLINIC | Age: 37
End: 2017-08-18

## 2017-08-18 ENCOUNTER — LAB VISIT (OUTPATIENT)
Dept: LAB | Facility: HOSPITAL | Age: 37
End: 2017-08-18
Attending: INTERNAL MEDICINE
Payer: COMMERCIAL

## 2017-08-18 DIAGNOSIS — E87.5 HYPERKALEMIA: ICD-10-CM

## 2017-08-18 LAB
ANION GAP SERPL CALC-SCNC: 8 MMOL/L
BUN SERPL-MCNC: 20 MG/DL
CALCIUM SERPL-MCNC: 9 MG/DL
CHLORIDE SERPL-SCNC: 106 MMOL/L
CO2 SERPL-SCNC: 26 MMOL/L
CREAT SERPL-MCNC: 0.9 MG/DL
EST. GFR  (AFRICAN AMERICAN): >60 ML/MIN/1.73 M^2
EST. GFR  (NON AFRICAN AMERICAN): >60 ML/MIN/1.73 M^2
GLUCOSE SERPL-MCNC: 105 MG/DL
POTASSIUM SERPL-SCNC: 3.6 MMOL/L
SODIUM SERPL-SCNC: 140 MMOL/L

## 2017-08-18 PROCEDURE — 80048 BASIC METABOLIC PNL TOTAL CA: CPT

## 2017-08-18 PROCEDURE — 36415 COLL VENOUS BLD VENIPUNCTURE: CPT

## 2017-09-08 ENCOUNTER — PATIENT MESSAGE (OUTPATIENT)
Dept: INTERNAL MEDICINE | Facility: CLINIC | Age: 37
End: 2017-09-08

## 2017-09-08 ENCOUNTER — PATIENT MESSAGE (OUTPATIENT)
Dept: ELECTROPHYSIOLOGY | Facility: CLINIC | Age: 37
End: 2017-09-08

## 2017-09-08 DIAGNOSIS — R00.2 PALPITATIONS: Primary | ICD-10-CM

## 2017-09-08 DIAGNOSIS — I49.1 PREMATURE ATRIAL CONTRACTIONS: Chronic | ICD-10-CM

## 2017-09-08 RX ORDER — METOPROLOL SUCCINATE 25 MG/1
25 TABLET, EXTENDED RELEASE ORAL DAILY
Qty: 30 TABLET | Refills: 11 | Status: SHIPPED | OUTPATIENT
Start: 2017-09-08 | End: 2018-09-30 | Stop reason: SDUPTHER

## 2017-09-19 ENCOUNTER — OFFICE VISIT (OUTPATIENT)
Dept: INTERNAL MEDICINE | Facility: CLINIC | Age: 37
End: 2017-09-19
Payer: COMMERCIAL

## 2017-09-19 VITALS
DIASTOLIC BLOOD PRESSURE: 80 MMHG | SYSTOLIC BLOOD PRESSURE: 130 MMHG | HEIGHT: 64 IN | BODY MASS INDEX: 37.63 KG/M2 | WEIGHT: 220.44 LBS

## 2017-09-19 DIAGNOSIS — Z23 IMMUNIZATION DUE: Primary | ICD-10-CM

## 2017-09-19 DIAGNOSIS — F41.9 ANXIETY: Primary | ICD-10-CM

## 2017-09-19 DIAGNOSIS — E66.01 SEVERE OBESITY (BMI 35.0-35.9 WITH COMORBIDITY): ICD-10-CM

## 2017-09-19 DIAGNOSIS — F33.41 RECURRENT MAJOR DEPRESSIVE DISORDER, IN PARTIAL REMISSION: ICD-10-CM

## 2017-09-19 PROBLEM — R00.2 PALPITATIONS: Status: RESOLVED | Noted: 2017-05-02 | Resolved: 2017-09-19

## 2017-09-19 PROCEDURE — 3008F BODY MASS INDEX DOCD: CPT | Mod: S$GLB,,, | Performed by: INTERNAL MEDICINE

## 2017-09-19 PROCEDURE — 99999 PR PBB SHADOW E&M-EST. PATIENT-LVL IV: CPT | Mod: PBBFAC,,, | Performed by: INTERNAL MEDICINE

## 2017-09-19 PROCEDURE — 99214 OFFICE O/P EST MOD 30 MIN: CPT | Mod: S$GLB,,, | Performed by: INTERNAL MEDICINE

## 2017-09-19 RX ORDER — SERTRALINE HYDROCHLORIDE 50 MG/1
50 TABLET, FILM COATED ORAL DAILY
Qty: 30 TABLET | Refills: 6 | Status: SHIPPED | OUTPATIENT
Start: 2017-09-19 | End: 2019-08-15

## 2017-09-19 NOTE — PROGRESS NOTES
Subjective:       Patient ID: Zo Martínez is a 37 y.o. female.    Chief Complaint:  Mood issues    Recall took Wellbutrin for depression in April.  In past few weeks some strange moods and intrusive thoughts.  Meds d/c and no problems since then.  Feeling OK at present.  No SI or HI.  No sleep issues. No major anxiety; situational stress is better.    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      Does admit to a couple of panic attacks.    Saw Arrhythmia and was placed on metoprolol.    Finds herself ruminating, when had high K was very worried, can't stop thinking about this.  Worst case scenarios.  CBT discussed.    When younger was diagnosed with Tourette's syndrome.    Pretty stressed- special needs daughter.  Very scared of death.  Both parents ill live in [place].   is unemployed that she is the primary breadwinner.  Coping as well as she can given all the stresses.    Patient Active Problem List   Diagnosis    Anxiety    Gestational diabetes mellitus    B12 deficiency    Tourette's syndrome    Mild vitamin D deficiency    Family history of colon cancer: father age 55    Family history of breast cancer in mother    Depression    IFG (impaired fasting glucose)    Premature atrial contractions    Essential hypertension    Severe obesity (BMI 35.0-35.9 with comorbidity)     HPI  Review of Systems   Constitutional: Negative for chills, fatigue and fever.   HENT: Negative for congestion and postnasal drip.    Eyes: Negative.  Negative for visual disturbance.   Respiratory: Negative for cough, chest tightness, shortness of breath and wheezing.    Cardiovascular: Negative.    Gastrointestinal: Negative.  Negative for abdominal distention.   Musculoskeletal: Negative for arthralgias and back pain.   Psychiatric/Behavioral: Positive for dysphoric mood. Negative for agitation, behavioral problems, confusion, hallucinations, self-injury, sleep disturbance and suicidal ideas. The patient is  nervous/anxious. The patient is not hyperactive.        Objective:      Physical Exam   Constitutional: She is oriented to person, place, and time. She appears well-developed and well-nourished.   HENT:   Head: Normocephalic and atraumatic.   Right Ear: External ear normal.   Left Ear: External ear normal.   Mouth/Throat: Oropharynx is clear and moist.   Eyes: Conjunctivae are normal.   Neck: Normal range of motion. Neck supple. No thyromegaly present.   Cardiovascular: Normal rate.    Pulmonary/Chest: No respiratory distress.   Musculoskeletal: She exhibits no edema.   Neurological: She is alert and oriented to person, place, and time. No cranial nerve deficit. Coordination normal.   Skin: Skin is warm and dry. No rash noted. No erythema.   Psychiatric: She has a normal mood and affect. Her behavior is normal. Judgment and thought content normal.   Normal speech, normal affect.  Well groomed.  Good eye contact.  Able to contract for safety.  Not tearful.  No SI or HI.  Very good insight.       Assessment:       1. Anxiety    2. Recurrent major depressive disorder, in partial remission    3. Severe obesity (BMI 35.0-35.9 with comorbidity)        Plan:         Anxiety: Exercise, avoid caffeine, sleep hygiene, hydration, meditation, prayer.  She is in agreement to a formal psych assessment for medication consultation.  She has good insight.  She mainly lacks time but is hoping to work on lifestyle modification.    Recurrent major depressive disorder, in partial remission: Stable symptoms, has done well with Zoloft in the past, we'll restart this.  Cautions and side effects reviewed  -     Ambulatory consult to Psychiatry    Severe obesity (BMI 35.0-35.9 with comorbidity): Lifestyle issues discussed, keep a food diary.  Exercise, portion control  -     Ambulatory consult to Nutrition Services    Other orders  -     sertraline (ZOLOFT) 50 MG tablet; Take 1 tablet (50 mg total) by mouth once daily.  Dispense: 30 tablet;  Refill: 6    Follow-up in 2 weeks by email or phone; sooner with problems in the interim alarm symptoms reviewed.  She will tell her family members that she is on this new medication.  Sister takes Zoloft and has done well also.    Patient counseled for over 50% of her 25 minute appt, all questions answered,  chart reviewed and care coordinated.

## 2017-09-19 NOTE — PROGRESS NOTES
Per Health Maintenance/ pre-visit chart review, pt due for tetanus. Order placed per written order guideline.

## 2017-09-19 NOTE — PATIENT INSTRUCTIONS
Treating Anxiety Disorders with Medicine  An anxiety disorder can make you feel nervous or apprehensive, even without a clear reason. In people age 65 and older, generalized anxiety disorder is one of the most commonly diagnosed anxiety disorders. Many times it occurs with depression. Certain anxiety disorders can cause intense feelings of fear or panic. You may even have physical symptoms such as a racing heartbeat, sweating, or dizziness. If you have these feelings, you dont have to suffer anymore. Treatment to help you overcome your fears will likely include therapy (also called counseling). Medicine may also be prescribed to help control your symptoms.    Medicines  Certain medicines may be prescribed to help control your symptoms. So you may feel less anxious. You may also feel able to move forward with therapy. At first, medicines and dosages may need to be adjusted to find what works best for you. Try to be patient. Tell your healthcare provider how a medicine makes you feel. This way, you can work together to find the treatment thats best for you. Keep in mind that medicines can have side effects. Talk with your provider about any side effects that are bothering you. Changing the dose or type of medicine may help. Dont stop taking medicine on your own. That can cause symptoms to come back.  · Anti-anxiety medicine. This medicine eases symptoms and helps you relax. Your healthcare provider will explain when and how to use it. It may be prescribed for use before situations that make you anxious. You may also be told to take medicine on a regular schedule. Anti-anxiety medicine may make you feel a little sleepy or out of it. Dont drive a car or operate machinery while on this medicine, until you know how it affects you.  Caution  Never use alcohol or other drugs with anti-anxiety medicines. This could result in loss of muscular control, sedation, coma, or death. Also, use only the amount of medicine  prescribed for you. If you think you may have taken too much, get emergency care right away.   · Antidepressant medicine. This kind of medicine is often used to treat anxiety, even if you arent depressed. An antidepressant helps balance out brain chemicals. This helps keep anxiety under control. This medicine is taken on a schedule. It takes a few weeks to start working. If you dont notice a change at first, you may just need more time. But if you dont notice results after the first few weeks, tell your provider.  Keep taking medicines as prescribed  Never change your dosage, share or use another person's medicine, or stop taking your medicines without talking to your healthcare provider first. Keep the following in mind:  · Some medicines must be taken on a schedule. Make this part of your daily routine. For instance, always take your pill before brushing your teeth. A pillbox can help you remember if youve taken your medicine each day.  · Medicines are often taken for 6 to 12 months. Your healthcare provider will then evaluate whether you need to stay on them. Many people who have also had therapy may no longer need medicine to manage anxiety.  · You may need to stop taking medicine slowly to give your body time to adjust. When its time to stop, your healthcare provider will tell you more. Remember: Never stop taking your medicine without talking to your provider first.  · If symptoms return, you may need to start taking medicines again. This isnt your fault. Its just the nature of your anxiety disorder.  Special concerns  · Side effects. Medicines may cause side effects. Ask your healthcare provider or pharmacist what you can expect. They may have ideas for avoiding some side effects.  · Sexual problems. Some antidepressants can affect your desire for sex or your ability to have an orgasm. A change in dosage or medicine often solves the problem. If you have a sexual side effect that concerns you, tell your  healthcare provider.  · Addiction. If youve never had a problem with drugs or alcohol, you may not have a problem with medicines used to treat anxiety disorders. But always discuss the medicines with your healthcare provider before taking them. If you have a history of addiction, you may not be able to use certain medicines used to treat anxiety disorders.  · Medicine interactions. Always check with your pharmacist before using any over-the-counter medicines, including herbal supplements.   Date Last Reviewed: 5/1/2017 © 2000-2017 [employer]. [address] [place] [address], [place] [address]. All rights reserved. This information is not intended as a substitute for professional medical care. Always follow your healthcare professional's instructions.

## 2017-10-10 ENCOUNTER — PATIENT MESSAGE (OUTPATIENT)
Dept: INTERNAL MEDICINE | Facility: CLINIC | Age: 37
End: 2017-10-10

## 2018-01-16 ENCOUNTER — PATIENT MESSAGE (OUTPATIENT)
Dept: INTERNAL MEDICINE | Facility: CLINIC | Age: 38
End: 2018-01-16

## 2018-03-20 ENCOUNTER — OFFICE VISIT (OUTPATIENT)
Dept: CARDIOLOGY | Facility: CLINIC | Age: 38
End: 2018-03-20
Payer: COMMERCIAL

## 2018-03-20 VITALS
BODY MASS INDEX: 40.12 KG/M2 | SYSTOLIC BLOOD PRESSURE: 132 MMHG | WEIGHT: 226.44 LBS | HEIGHT: 63 IN | DIASTOLIC BLOOD PRESSURE: 90 MMHG | HEART RATE: 62 BPM

## 2018-03-20 DIAGNOSIS — R73.01 IFG (IMPAIRED FASTING GLUCOSE): ICD-10-CM

## 2018-03-20 DIAGNOSIS — E66.01 SEVERE OBESITY (BMI 35.0-35.9 WITH COMORBIDITY): ICD-10-CM

## 2018-03-20 DIAGNOSIS — Z91.89 AT RISK FOR CORONARY ARTERY DISEASE: Primary | ICD-10-CM

## 2018-03-20 DIAGNOSIS — I10 HYPERTENSION, UNSPECIFIED TYPE: ICD-10-CM

## 2018-03-20 LAB — B-HCG UR QL: NEGATIVE

## 2018-03-20 PROCEDURE — 3080F DIAST BP >= 90 MM HG: CPT | Mod: CPTII,S$GLB,, | Performed by: INTERNAL MEDICINE

## 2018-03-20 PROCEDURE — 81025 URINE PREGNANCY TEST: CPT

## 2018-03-20 PROCEDURE — 99214 OFFICE O/P EST MOD 30 MIN: CPT | Mod: S$GLB,,, | Performed by: INTERNAL MEDICINE

## 2018-03-20 PROCEDURE — 3075F SYST BP GE 130 - 139MM HG: CPT | Mod: CPTII,S$GLB,, | Performed by: INTERNAL MEDICINE

## 2018-03-20 PROCEDURE — 99999 PR PBB SHADOW E&M-EST. PATIENT-LVL IV: CPT | Mod: PBBFAC,,, | Performed by: STUDENT IN AN ORGANIZED HEALTH CARE EDUCATION/TRAINING PROGRAM

## 2018-03-20 NOTE — PATIENT INSTRUCTIONS
Exercise for a Healthier Heart  You may wonder how you can improve the health of your heart. If youre thinking about exercise, youre on the right track. You dont need to become an athlete, but you do need a certain amount of brisk exercise to help strengthen your heart. If you have been diagnosed with a heart condition, your doctor may recommend exercise to help stabilize your condition. To help make exercise a habit, choose safe, fun activities.     Exercise with a friend. When activity is fun, you're more likely to stick with it.     Be sure to check with your healthcare provider before starting an exercise program.   Why exercise?  Exercising regularly offers many healthy rewards. It can help you do all of the following:  · Improve your blood cholesterol level to help prevent further heart trouble  · Lower your blood pressure to help prevent a stroke or heart attack  · Control diabetes, or reduce your risk of getting this disease  · Improve your heart and lung function  · Reach and maintain a healthy weight  · Make your muscles stronger and more limber so you can stay active  · Prevent falls and fractures by slowing the loss of bone mass (osteoporosis)  · Manage stress better  · Reduce your blood pressure  · Improve your sense of self and your body image  Exercise tips  Ease into your routine. Set small goals. Then build on them.  Exercise on most days. Aim for a total of 150 or more minutes of moderate to  vigorous intensity activity each week. Consider 40 minutes, 3 to 4 times a week. For best results, activity should last for 40 minutes on average. It is OK to work up to the 40 minute period over time. Examples of moderate-intensity activity is walking 1 mile in 15 minutes or 30 to 45 minutes of yard work.  Step up your daily activity level. Along with your exercise program, try being more active throughout the day. Walk instead of drive. Do more household tasks or yard work.  Choose one or more  activities you enjoy. Walking is one of the easiest things you can do. You can also try swimming, riding a bike, dancing, or taking an exercise class.  Stop exercising and call your doctor if you:  · Have chest pain or feel dizzy or lightheaded  · Feel burning, tightness, pressure, or heaviness in your chest, neck, shoulders, back, or arms  · Have unusual shortness of breath  · Have increased joint or muscle pain  · Have palpitations or an irregular heartbeat   Date Last Reviewed: 5/1/2016  © 9779-8236 [employer]. [address] [place] [address]. All rights reserved. This information is not intended as a substitute for professional medical care. Always follow your healthcare professional's instructions.

## 2018-03-20 NOTE — PROGRESS NOTES
Cardiology Clinic Note  Reason for Visit: CV risk    HPI:   Ms. Martínez is a 38 year old woman presenting to discuss cardiovascular risk. She recently had an elevated CRP noted on outside work physical of 4.6 (<1 low risk, 1-3 intermediate risk, >3 high risk per lab report). She reports for high values for at least last 2-3 yrs.  She has previously been seen by Dr. Martínez with Electrophysiology regarding palpitations and chest pain. She has a long-history of palpitations and hypertension; previously reported sensation of sustained palpitations for <30s as well as frequent skipped beats. In April 2017, a 48 hour Holter showed 1 PAC in first 24 hours, then 1108 PACs in 2nd 24 hours; no sustained arrhythmias. A 14 day ZIO patch in July 2017 noted rare PACs/PVCs, 1 x 6 beat run of AT , 1 x 4 beat run of NSVT. 49 pt triggered events with majority in sinus rhythm. She was switched from metoprolol to atenolol due to an interaction with bupropion. She was then switched to diltiazem due to fetal risk in case of unplanned pregnancy. However, she stopped bupropion and was switched back to metoprolol 25 which she remains on.    In terms of CV risk, she is a lifelong nonsmoker. Her family history is notable for hypertension but no early heart disease though both of her parents developed heart disease in their 60s-70s. Working on diet/exercise/wt loss but notes minimal exercise and not eating as well as she should. Given packet on the Mediterranean diet.  She had an exercise stress echo with normal echo at baseline and no ECG/echo changes to suggest ischemia in July 2017.  She still has regular palpitations but feels better on the metoprolol. Denies Cp other than occasional twinges in her chest; no exertional CP or dysnpea. No significant KEY, PND, orthopnea, JULIETH, presyncope/syncope.    April 2017 Labs: , LDL 90, HDL 46; A1c 6.0%    Zebulon risk score: 3.9% (low)  ASCVD 10 year risk: 0.8% (optimal ASCVD risk  0.3%)     7/2017 Exercise Stress Echo  CONCLUSIONS     1 - Normal left ventricular systolic function (EF 60-65%).     2 - No wall motion abnormalities.     3 - Normal left ventricular diastolic function.     4 - Normal right ventricular systolic function .     5 - The estimated PA systolic pressure is 23 mmHg.     6 - Mild pulmonic regurgitation.   No evidence of stress induced myocardial ischemia.      ROS:    Constitution: Negative for fever or chills. Negative for weight loss or gain.   HENT: Negative for sore throat or headaches. Negative for rhinorrhea.  Eyes: Negative for blurred or double vision.   Cardiovascular: See above  Pulmonary: Negative for SOB. Negative for cough.   Gastrointestinal: Negative for abdominal pain. Negative for nausea/ vomiting. Negative for diarrhea.   : Negative for dysuria.   Neurological: Negative for focal weakness or sensory changes.  PMH:     Past Medical History:   Diagnosis Date    Anxiety     Family history of breast cancer in mother 4/18/2017    Family history of colon cancer: father age 55 4/18/2017    Gestational diabetes mellitus     Obesity, Class I, BMI 30-34.9     Severe obesity (BMI 35.0-35.9 with comorbidity) 9/19/2017    Tourette's syndrome     x- have subsided    Transient elevated blood pressure      Past Surgical History:   Procedure Laterality Date    CHOLECYSTECTOMY      DILATION AND CURETTAGE OF UTERUS       Allergies:   Review of patient's allergies indicates:  No Known Allergies  Medications:     Current Outpatient Prescriptions on File Prior to Visit   Medication Sig Dispense Refill    ACCU-CHEK FASTCLIX Misc       metoprolol succinate (TOPROL-XL) 25 MG 24 hr tablet Take 1 tablet (25 mg total) by mouth once daily. 30 tablet 11    sertraline (ZOLOFT) 50 MG tablet Take 1 tablet (50 mg total) by mouth once daily. 30 tablet 6     No current facility-administered medications on file prior to visit.      Social History:     Social History    Substance Use Topics    Smoking status: Never Smoker    Smokeless tobacco: Never Used    Alcohol use Yes      Comment: A  few times a month     Family History:     Family History   Problem Relation Age of Onset    Heart disease Mother      stenting    Diabetes Mother     Cancer Mother 63     breast, also vaginal    Hypertension Mother     Hyperlipidemia Mother     Heart disease Father      CHF    Hypertension Father     Cancer Father      colon    Diabetes Sister     Heart disease Sister      stents    Diabetes Brother     Heart disease Daughter      Tetralogy    Down syndrome Daughter     No Known Problems Son     Stroke Maternal Grandmother     Heart disease Maternal Grandfather     Heart disease Paternal Grandmother      MI    Diabetes Sister      Physical Exam:   There were no vitals taken for this visit.     Constitutional: No acute distress, conversant  HEENT: Sclera anicteric, Pupils equal, round and reactive to light, extraocular motions intact, Oropharynx clear  Neck: No JVD, no carotid bruits  Cardiovascular: regular rate and rhythm, no murmur, rubs or gallops, normal S1/S2  Pulmonary: Clear to auscultation bilaterally  Abdominal: Abdomen soft, nontender, nondistended, positive bowel sounds  Extremities: No lower extremity edema,   Pulses: 2+ BL Radial, 2+ BL carotid, 2+ BL DP, 2+ BL PT, normal Allens Test BL  Skin: No ecchymosis, erythema, or ulcers  Psych: Alert and oriented x 3, appropriate affect  Neuro: CNII-XII intact, no focal deficits    Labs:     Lab Results   Component Value Date     08/18/2017    K 3.6 08/18/2017     08/18/2017    CO2 26 08/18/2017    BUN 20 08/18/2017    CREATININE 0.9 08/18/2017    ANIONGAP 8 08/18/2017     Lab Results   Component Value Date    AST 30 04/24/2017    ALT 41 04/24/2017    ALKPHOS 121 04/24/2017    BILITOT 0.4 04/24/2017    ALBUMIN 3.7 04/24/2017     Lab Results   Component Value Date    CALCIUM 9.0 08/18/2017     No results  found for: BNP, BNPTRIAGEBLO Lab Results   Component Value Date    WBC 7.65 04/24/2017    HGB 13.3 04/24/2017    HCT 40.5 04/24/2017     04/24/2017    GRAN 5.2 04/24/2017    GRAN 67.7 04/24/2017     No results found for: PTT, INR  Lab Results   Component Value Date    CHOL 164 04/24/2017    HDL 46 04/24/2017    LDLCALC 90.2 04/24/2017    TRIG 139 04/24/2017     Lab Results   Component Value Date    HGBA1C 6.0 04/24/2017     Lab Results   Component Value Date    TSH 0.887 04/24/2017          Imaging:     EF   Date Value Ref Range Status   07/20/2017 65 55 - 65    11/12/2013 60       EKG: NSR, normal EKG  Assessment:   38F with hx of PACs and hypertension on metoprolol, pre-DM (a1c ~6 since 2012), elevated CRP on routine outside lab work here for cardiovascular risk assessment. Her main risk factor is hypertension as well as obesity. She is pre-DM and discussed importance for overall as well as CV health of remaining non-diabetic. While elevated CRP has been associated with increased CV risk, it is a nonspecific marker and with less independent association than more traditional risk factors such as HTN, Dm, smoking, etc. Her lipids are within normal range, last LDL 90 4/2017. She is low risk by the ASCVD risk score (0.8% 10 yr risk) and [place] (3.8%). A stress echo showed a structurally normal heart and no evidence of obstructive CAD. BP is upper limit of normal, goal ,130 systolic and may require uptitration of medication in the future. In terms of reducing her long term risk, discussed importance of improving her diet (advised the Mediterranean diet) and exercise (advise 150 minutes of moderate intensity weekly as well as weight loss and prevention of diabetes through these lifestyle modifications. Given her elevated CRP without other explanation, will obtain cardiac calcium score still.    1. At risk for coronary artery disease  Pregnancy, urine rapid   2. Hypertension, unspecified type  CT Cardiac  Scoring   3. IFG (impaired fasting glucose)     4. Severe obesity (BMI 35.0-35.9 with comorbidity)         Plan:   -CT calcium score (UPT beforehand)  -Continue metoprolol  -Lipids stable since 2012; recheck q2y  -If lipids increasing or calcium score abnml, will consider statin initiation  -Diet/exercise/wt loss discussed as above      Signed:    Syed Brown MD  Cardiology Fellow PGY4  Beeper:  [phone]  3/20/2018 1:43 PM    Follow-up:   Future Appointments  Date Time Provider Department Center   3/20/2018 2:00 PM Syed Brown MD [facility] CARDIO David Renaldo

## 2018-03-28 ENCOUNTER — HOSPITAL ENCOUNTER (OUTPATIENT)
Dept: RADIOLOGY | Facility: HOSPITAL | Age: 38
Discharge: HOME | End: 2018-03-28
Attending: STUDENT IN AN ORGANIZED HEALTH CARE EDUCATION/TRAINING PROGRAM
Payer: COMMERCIAL

## 2018-03-28 DIAGNOSIS — I10 HYPERTENSION, UNSPECIFIED TYPE: ICD-10-CM

## 2018-03-28 PROCEDURE — 75571 CT HRT W/O DYE W/CA TEST: CPT | Mod: 26,,, | Performed by: RADIOLOGY

## 2018-03-28 PROCEDURE — 75571 CT HRT W/O DYE W/CA TEST: CPT | Mod: TC

## 2018-04-26 ENCOUNTER — PATIENT MESSAGE (OUTPATIENT)
Dept: INTERNAL MEDICINE | Facility: CLINIC | Age: 38
End: 2018-04-26

## 2018-06-01 ENCOUNTER — PATIENT MESSAGE (OUTPATIENT)
Dept: ELECTROPHYSIOLOGY | Facility: CLINIC | Age: 38
End: 2018-06-01

## 2018-06-04 ENCOUNTER — TELEPHONE (OUTPATIENT)
Dept: ELECTROPHYSIOLOGY | Facility: CLINIC | Age: 38
End: 2018-06-04

## 2018-06-04 DIAGNOSIS — I49.1 PREMATURE ATRIAL CONTRACTIONS: Primary | ICD-10-CM

## 2018-06-04 NOTE — TELEPHONE ENCOUNTER
Spoke with patient and scheduled 48 hour holter for 6/8/2018 at 8:20am. She verbalized understanding and confirmed appt.

## 2018-07-10 ENCOUNTER — TELEPHONE (OUTPATIENT)
Dept: INTERNAL MEDICINE | Facility: CLINIC | Age: 38
End: 2018-07-10

## 2018-07-10 NOTE — TELEPHONE ENCOUNTER
----- Message from Velvet FERNANDO Thornton sent at 7/10/2018  2:43 PM CDT -----  Contact: PT Portal Request  Appointment Request From: Zo Martínez    With Provider: Other - (see comments)    Would Accept With:Only the person I've selected    Preferred Date Range: Any date 7/8/2018 or later    Preferred Times: Any    Reason for visit: Request an Appt    Comments:  Requesting an appointment with Dr. ronak Venegas as recommended by my primary care doctor scott nicolas for follow-up regarding proper medications and depression/anxiety treatments.

## 2018-07-11 ENCOUNTER — PATIENT MESSAGE (OUTPATIENT)
Dept: INTERNAL MEDICINE | Facility: CLINIC | Age: 38
End: 2018-07-11

## 2018-07-11 ENCOUNTER — TELEPHONE (OUTPATIENT)
Dept: INTERNAL MEDICINE | Facility: CLINIC | Age: 38
End: 2018-07-11

## 2018-07-11 NOTE — TELEPHONE ENCOUNTER
----- Message from John Giron sent at 7/11/2018  3:25 PM CDT -----  Contact: Patient [phone]  Patient is returning a phone call.  Who left a message for the patient: Italo  Does patient know what this is regarding:  Rx appt to be seen  Comments: requesting for a call back    Please call an advise  Thank you

## 2018-07-17 ENCOUNTER — OFFICE VISIT (OUTPATIENT)
Dept: ELECTROPHYSIOLOGY | Facility: CLINIC | Age: 38
End: 2018-07-17
Payer: COMMERCIAL

## 2018-07-17 ENCOUNTER — HOSPITAL ENCOUNTER (OUTPATIENT)
Dept: CARDIOLOGY | Facility: CLINIC | Age: 38
Discharge: HOME | End: 2018-07-17
Payer: COMMERCIAL

## 2018-07-17 VITALS
HEART RATE: 101 BPM | HEIGHT: 64 IN | BODY MASS INDEX: 39.14 KG/M2 | WEIGHT: 229.25 LBS | SYSTOLIC BLOOD PRESSURE: 134 MMHG | OXYGEN SATURATION: 94 % | DIASTOLIC BLOOD PRESSURE: 78 MMHG

## 2018-07-17 DIAGNOSIS — I10 ESSENTIAL HYPERTENSION: Chronic | ICD-10-CM

## 2018-07-17 DIAGNOSIS — I47.29 NONSUSTAINED VENTRICULAR TACHYCARDIA: Chronic | ICD-10-CM

## 2018-07-17 DIAGNOSIS — F41.9 ANXIETY: ICD-10-CM

## 2018-07-17 DIAGNOSIS — I49.1 PREMATURE ATRIAL CONTRACTIONS: Primary | Chronic | ICD-10-CM

## 2018-07-17 PROCEDURE — 3075F SYST BP GE 130 - 139MM HG: CPT | Mod: CPTII,S$GLB,, | Performed by: INTERNAL MEDICINE

## 2018-07-17 PROCEDURE — 3008F BODY MASS INDEX DOCD: CPT | Mod: CPTII,S$GLB,, | Performed by: INTERNAL MEDICINE

## 2018-07-17 PROCEDURE — 93000 ELECTROCARDIOGRAM COMPLETE: CPT | Mod: S$GLB,,, | Performed by: INTERNAL MEDICINE

## 2018-07-17 PROCEDURE — 99213 OFFICE O/P EST LOW 20 MIN: CPT | Mod: S$GLB,,, | Performed by: INTERNAL MEDICINE

## 2018-07-17 PROCEDURE — 99999 PR PBB SHADOW E&M-EST. PATIENT-LVL III: CPT | Mod: PBBFAC,,, | Performed by: INTERNAL MEDICINE

## 2018-07-17 PROCEDURE — 3078F DIAST BP <80 MM HG: CPT | Mod: CPTII,S$GLB,, | Performed by: INTERNAL MEDICINE

## 2018-07-17 RX ORDER — ALPRAZOLAM 0.5 MG/1
0.5 TABLET ORAL DAILY PRN
COMMUNITY
End: 2019-08-15

## 2018-07-17 NOTE — PROGRESS NOTES
"Subjective:    Patient ID:  Zo Martínez is a 38 y.o. female who presents for follow-up of Palpitations    Referring Physician: Guillermina Zuniga MD    HPI  Prior Hx:  I had the pleasure of seeing Mrs. Martínez today in our electrophysiology clinic in consultation for her palpitations and chest pain.  As you are aware she is a pleasant 37 year-old woman with long-history of palpitations, hypertension, depression, and obesity.  She has noted for years to have episodes of skipped beats that occur without an identifiable trigger.  Some days are better than other days.  They are associated with a "sinking" or "dropping" sensation of her heart in her chest.  She has cut out the caffeine from her diet which has not effected her symptoms.  She also has occasional episodes of more sustained palpitations that last for a bit but <30 seconds.  Then recently she had an episode of squeezing/pushing chest pressure in the center of her chest while driving with associated shortness of breath that lasted for ~30 seconds.  A 48 hour holter monitor was performed.  Day 1 revealed 1 PAC.  Day 2 revealed 1108 PACs.  She noted that neither day was truly representative of her typical symptom burden.  Moreover she did not have any of the more "sustained" episodes while wearing the monitor.  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 She reports having early blood pressure in her family but does not believe anyone had a heart attack at a young age. She is a lifelong non-smoker. She is not physically active however wants to start exercising and lose weight.    Mrs. Martínez elected to wear a 14 day ZIO patch which noted rare PACS/PVCS, 1 6 beat run of AT, and 1 4 beat run of WCT, likely non-sustained VT.  There were however 49 patient triggered events with the majority correlating to sinus rhythm. We rx her atenolol as metoprolol had an interaction with bupropion. She had a treadmill exercise stress echo that " showed a structurally normal heart without any ischemic changes. She presents for follow-up. She reports feeling much better on atenolol and has very few palpitations. She reports she and her  do not prevent pregnancy.    Interim Hx:  Mrs. Martínez has been well. She is on metoprolol and tolerating it with occasional palpitations. Her mother recently passed away and with stress of that event had brief increase in her palpitations.     My interpretation of today's in clinic electrocardiogram is normal sinus rhythm.    Review of Systems   Constitution: Negative for weakness and malaise/fatigue.   HENT: Negative for congestion and sore throat.    Eyes: Negative for blurred vision and visual disturbance.   Cardiovascular: Positive for palpitations (better with metoprolol). Negative for chest pain, dyspnea on exertion, irregular heartbeat, leg swelling, near-syncope, orthopnea, paroxysmal nocturnal dyspnea and syncope.   Respiratory: Negative for cough and shortness of breath.    Hematologic/Lymphatic: Negative for bleeding problem. Does not bruise/bleed easily.   Skin: Negative.    Musculoskeletal: Negative.    Gastrointestinal: Negative for bloating and abdominal pain.   Neurological: Negative for dizziness and light-headedness.        Objective:    Physical Exam   Constitutional: She is oriented to person, place, and time. She appears well-developed and well-nourished. No distress.   HENT:   Head: Normocephalic and atraumatic.   Eyes: Conjunctivae are normal. Right eye exhibits no discharge. Left eye exhibits no discharge.   Neck: Neck supple. No JVD present.   Cardiovascular: Normal rate, regular rhythm and normal heart sounds.  Exam reveals no gallop and no friction rub.    No murmur heard.  Pulmonary/Chest: Effort normal and breath sounds normal. No respiratory distress. She has no wheezes. She has no rales.   Abdominal: Soft. Bowel sounds are normal. She exhibits no distension. There is no tenderness. There  is no rebound.   Musculoskeletal: She exhibits no edema.   Neurological: She is alert and oriented to person, place, and time.   Skin: Skin is warm and dry. She is not diaphoretic.   Psychiatric: She has a normal mood and affect. Her behavior is normal. Judgment and thought content normal.   Vitals reviewed.        Assessment:       1. Premature atrial contractions    2. Nonsustained ventricular tachycardia    3. Essential hypertension    4. Anxiety         Plan:         In summary, Mrs. Martínez is a pleasant 38 year-old woman with long-history of palpitations, depression, and obesity presenting for palpitations and chest discomfort.  Most of her symptoms correlate to sinus rhythm on her monitor.  However some may be from PACs/PVCs. She had 1 4 beat run of likely NSVT on her monitor. Her exercise stress echo was unremarkable for ischemia. Symptoms controlled on low-dose metoprolol. Continue same. RTC in 1 year, sooner if needed.

## 2018-07-18 DIAGNOSIS — I47.29 NONSUSTAINED VENTRICULAR TACHYCARDIA: Primary | ICD-10-CM

## 2018-08-06 DIAGNOSIS — Z12.39 BREAST CANCER SCREENING: ICD-10-CM

## 2018-09-30 DIAGNOSIS — I49.1 PREMATURE ATRIAL CONTRACTIONS: Chronic | ICD-10-CM

## 2018-09-30 DIAGNOSIS — R00.2 PALPITATIONS: ICD-10-CM

## 2018-10-01 RX ORDER — METOPROLOL SUCCINATE 25 MG/1
TABLET, EXTENDED RELEASE ORAL
Qty: 30 TABLET | Refills: 0 | Status: SHIPPED | OUTPATIENT
Start: 2018-10-01 | End: 2018-12-27 | Stop reason: SDUPTHER

## 2018-11-21 LAB
EXT A-SMA: NORMAL
EXT A1AT LEVEL: NORMAL
EXT A1AT PHENOTYPE: NORMAL
EXT AFP: NORMAL
EXT ALBUMIN: 4.5
EXT ALP: 121
EXT ALT: 69
EXT AMA: NORMAL
EXT AMYLASE: NORMAL
EXT ANA: NORMAL
EXT ANCA/MPO/PR3 ANTIBODIES: NORMAL
EXT ANTI THYROID AB: NORMAL
EXT ANTI-E.HISTOLYTICA AB: NORMAL
EXT AST: 41
EXT BASOPHIL%: NORMAL
EXT BILIRUBIN DIRECT: NORMAL MG/DL
EXT BILIRUBIN DIRECT: NORMAL MG/DL
EXT BILIRUBIN TOTAL: 0.3
EXT BILIRUBIN TOTAL: NORMAL
EXT BUN: 15
EXT C-REACTIVE PROTEIN: NORMAL
EXT CA 125: NORMAL
EXT CA 19-9: NORMAL
EXT CALCIUM: 9.2
EXT CEA: NORMAL
EXT CERULOPLASMIN: NORMAL
EXT CHLORIDE: 100
EXT CHOLESTEROL: 162
EXT CO2: NORMAL
EXT CREATININE: 0.8 MG/DL
EXT CRYOGLOBULIN: NORMAL
EXT EBV DNA BY PCR: NORMAL
EXT ECHINOCOCCUS AB: NORMAL
EXT EOSINOPHIL%: NORMAL
EXT FERRITIN: NORMAL
EXT FOLATE: NORMAL
EXT GGT: NORMAL
EXT GLUCOSE UA: NORMAL
EXT GLUCOSE: 99
EXT HA AB: NORMAL
EXT HBC AB: NORMAL
EXT HBE AB: NORMAL
EXT HBS AB: NORMAL
EXT HBS AG: NORMAL
EXT HBV DNA PCR QUANT: NORMAL
EXT HCV AB: NORMAL
EXT HCV FIBROSURE: NORMAL
EXT HCV GENOTYPE: NORMAL
EXT HCV QUALITATIVE: NORMAL
EXT HCV RNA QUANT PCR: NORMAL
EXT HDL: 45
EXT HEMATOCRIT: 42.7
EXT HEMOCHROMATOSIS GENE ANALYSIS: NORMAL
EXT HEMOGLOBIN A1C: NORMAL %
EXT HEMOGLOBIN A1C: NORMAL %
EXT HEMOGLOBIN: 13.8
EXT HEP B MUTATION, ANALYSIS: NORMAL
EXT HIV: NORMAL
EXT IGG: NORMAL
EXT IGM: NORMAL
EXT INR: NORMAL
EXT INSULIN: NORMAL
EXT IRON SATURATION: NORMAL
EXT LDH, TOTAL: NORMAL
EXT LDH, TOTAL: NORMAL
EXT LDL CHOLESTEROL: 82
EXT LIPASE: NORMAL
EXT LYMPH%: NORMAL
EXT MCH: 27.7
EXT MCHC: 32.3
EXT MCV: 86
EXT MONOCYTES%: NORMAL
EXT MPV: NORMAL
EXT NEUT%: NORMAL
EXT PLATELETS: 260
EXT POTASSIUM: 5.1
EXT PROTEIN TOTAL: NORMAL
EXT PROTEIN TOTAL: NORMAL
EXT PROTHROMBIN TIME, INR (MECH HEART VALVE/ANTIPHOSPHOLIPID): NORMAL
EXT PT: NORMAL
EXT PT: NORMAL
EXT RBC UA: NORMAL
EXT RDW: 16.5
EXT RETICULOCYTE COUNT: NORMAL
EXT RIBA RESULT: NORMAL
EXT SODIUM: 139 MMOL/L
EXT TACROLIMUS LVL: NORMAL
EXT TPMT ENZYME: NORMAL
EXT TPMT GENETICS: NORMAL
EXT TRANSGLUTAMINASE: NORMAL
EXT TRIGLYCERIDES: 176
EXT TRIGLYCERIDES: NORMAL
EXT TSH: NORMAL
EXT VITAMIN B12: NORMAL
EXT WBC: 8.6
HBE AG: NORMAL

## 2018-12-27 DIAGNOSIS — I49.1 PREMATURE ATRIAL CONTRACTIONS: Chronic | ICD-10-CM

## 2018-12-27 DIAGNOSIS — R00.2 PALPITATIONS: ICD-10-CM

## 2018-12-27 RX ORDER — METOPROLOL SUCCINATE 25 MG/1
TABLET, EXTENDED RELEASE ORAL
Qty: 30 TABLET | Refills: 0 | Status: SHIPPED | OUTPATIENT
Start: 2018-12-27 | End: 2019-02-08 | Stop reason: SDUPTHER

## 2019-02-08 DIAGNOSIS — R00.2 PALPITATIONS: ICD-10-CM

## 2019-02-08 DIAGNOSIS — I49.1 PREMATURE ATRIAL CONTRACTIONS: Chronic | ICD-10-CM

## 2019-02-08 RX ORDER — METOPROLOL SUCCINATE 25 MG/1
TABLET, EXTENDED RELEASE ORAL
Qty: 30 TABLET | Refills: 6 | Status: SHIPPED | OUTPATIENT
Start: 2019-02-08 | End: 2019-10-31 | Stop reason: SDUPTHER

## 2019-02-11 LAB
EXT A-SMA: NORMAL
EXT A1AT LEVEL: NORMAL
EXT A1AT PHENOTYPE: NORMAL
EXT AFP: NORMAL
EXT ALBUMIN: NORMAL
EXT ALP: NORMAL
EXT ALT: NORMAL
EXT AMA: NORMAL
EXT AMYLASE: NORMAL
EXT ANA: NORMAL
EXT ANCA/MPO/PR3 ANTIBODIES: NORMAL
EXT ANTI THYROID AB: NORMAL
EXT ANTI-E.HISTOLYTICA AB: NORMAL
EXT AST: NORMAL
EXT BASOPHIL%: NORMAL
EXT BILIRUBIN DIRECT: NORMAL MG/DL
EXT BILIRUBIN DIRECT: NORMAL MG/DL
EXT BILIRUBIN TOTAL: NORMAL
EXT BILIRUBIN TOTAL: NORMAL
EXT BUN: NORMAL
EXT C-REACTIVE PROTEIN: NORMAL
EXT CA 125: NORMAL
EXT CA 19-9: NORMAL
EXT CALCIUM: NORMAL
EXT CEA: NORMAL
EXT CERULOPLASMIN: NORMAL
EXT CHLORIDE: NORMAL
EXT CHOLESTEROL: NORMAL
EXT CO2: NORMAL
EXT CREATININE: NORMAL MG/DL
EXT CRYOGLOBULIN: NORMAL
EXT EBV DNA BY PCR: NORMAL
EXT ECHINOCOCCUS AB: NORMAL
EXT EOSINOPHIL%: NORMAL
EXT FERRITIN: NORMAL
EXT FOLATE: NORMAL
EXT GGT: NORMAL
EXT GLUCOSE UA: NORMAL
EXT GLUCOSE: NORMAL
EXT HA AB: NORMAL
EXT HBC AB: NORMAL
EXT HBE AB: NORMAL
EXT HBS AB: NORMAL
EXT HBS AG: NORMAL
EXT HBV DNA PCR QUANT: NORMAL
EXT HCV AB: NORMAL
EXT HCV FIBROSURE: NORMAL
EXT HCV GENOTYPE: NORMAL
EXT HCV QUALITATIVE: NORMAL
EXT HCV RNA QUANT PCR: NORMAL
EXT HDL: NORMAL
EXT HEMATOCRIT: NORMAL
EXT HEMOCHROMATOSIS GENE ANALYSIS: NORMAL
EXT HEMOGLOBIN A1C: 6.1 %
EXT HEMOGLOBIN A1C: NORMAL %
EXT HEMOGLOBIN: NORMAL
EXT HEP B MUTATION, ANALYSIS: NORMAL
EXT HIV: NORMAL
EXT IGG: NORMAL
EXT IGM: NORMAL
EXT INR: NORMAL
EXT INSULIN: NORMAL
EXT IRON SATURATION: NORMAL
EXT LDH, TOTAL: NORMAL
EXT LDH, TOTAL: NORMAL
EXT LDL CHOLESTEROL: NORMAL
EXT LIPASE: NORMAL
EXT LYMPH%: NORMAL
EXT MCH: NORMAL
EXT MCHC: NORMAL
EXT MCV: NORMAL
EXT MONOCYTES%: NORMAL
EXT MPV: NORMAL
EXT NEUT%: NORMAL
EXT PLATELETS: NORMAL
EXT POTASSIUM: NORMAL
EXT PROTEIN TOTAL: NORMAL
EXT PROTEIN TOTAL: NORMAL
EXT PROTHROMBIN TIME, INR (MECH HEART VALVE/ANTIPHOSPHOLIPID): NORMAL
EXT PT: NORMAL
EXT PT: NORMAL
EXT RBC UA: NORMAL
EXT RDW: NORMAL
EXT RETICULOCYTE COUNT: NORMAL
EXT RIBA RESULT: NORMAL
EXT SODIUM: NORMAL MMOL/L
EXT TACROLIMUS LVL: NORMAL
EXT TPMT ENZYME: NORMAL
EXT TPMT GENETICS: NORMAL
EXT TRANSGLUTAMINASE: NORMAL
EXT TRIGLYCERIDES: NORMAL
EXT TRIGLYCERIDES: NORMAL
EXT TSH: NORMAL
EXT VITAMIN B12: NORMAL
EXT WBC: NORMAL
HBE AG: NORMAL

## 2019-08-06 ENCOUNTER — OCCUPATIONAL HEALTH (OUTPATIENT)
Dept: URGENT CARE | Facility: CLINIC | Age: 39
End: 2019-08-06

## 2019-08-06 DIAGNOSIS — Z02.1 PRE-EMPLOYMENT EXAMINATION: Primary | ICD-10-CM

## 2019-08-06 PROCEDURE — 99000 SPECIMEN HANDLING OFFICE-LAB: CPT | Mod: S$GLB,,, | Performed by: PHYSICIAN ASSISTANT

## 2019-08-06 PROCEDURE — 84443 ASSAY THYROID STIM HORMONE: CPT | Mod: QW,S$GLB,, | Performed by: PHYSICIAN ASSISTANT

## 2019-08-06 PROCEDURE — 92552 PURE TONE AUDIOMETRY AIR: CPT | Mod: S$GLB,,, | Performed by: PHYSICIAN ASSISTANT

## 2019-08-06 PROCEDURE — 93000 ELECTROCARDIOGRAM COMPLETE: CPT | Mod: S$GLB,,, | Performed by: INTERNAL MEDICINE

## 2019-08-06 PROCEDURE — 86140 C-REACTIVE PROTEIN: ICD-10-PCS | Mod: S$GLB,,, | Performed by: PHYSICIAN ASSISTANT

## 2019-08-06 PROCEDURE — 99002 MASK FIT-QUALITATIVE: ICD-10-PCS | Mod: S$GLB,,, | Performed by: PHYSICIAN ASSISTANT

## 2019-08-06 PROCEDURE — 92552 PR PURE TONE AUDIOMETRY, AIR: ICD-10-PCS | Mod: S$GLB,,, | Performed by: PHYSICIAN ASSISTANT

## 2019-08-06 PROCEDURE — 36415 PR COLLECTION VENOUS BLOOD,VENIPUNCTURE: ICD-10-PCS | Mod: S$GLB,,, | Performed by: PHYSICIAN ASSISTANT

## 2019-08-06 PROCEDURE — 99002 DEVICE HANDLING PHYS/QHP: CPT | Mod: S$GLB,,, | Performed by: PHYSICIAN ASSISTANT

## 2019-08-06 PROCEDURE — 99499 PHYSICAL - BASIC COMPLEXITY: ICD-10-PCS | Mod: S$GLB,,, | Performed by: PHYSICIAN ASSISTANT

## 2019-08-06 PROCEDURE — 99499 UNLISTED E&M SERVICE: CPT | Mod: S$GLB,,, | Performed by: PHYSICIAN ASSISTANT

## 2019-08-06 PROCEDURE — 36415 COLL VENOUS BLD VENIPUNCTURE: CPT | Mod: S$GLB,,, | Performed by: PHYSICIAN ASSISTANT

## 2019-08-06 PROCEDURE — 94010 PULMONARY FUNCTION SCREENING (OCC MED_PHYSICALS): ICD-10-PCS | Mod: S$GLB,,, | Performed by: PHYSICIAN ASSISTANT

## 2019-08-06 PROCEDURE — 89240 OOH PANEL 3 (CMP, CBCW/DIFF, MUA, LIPID): ICD-10-PCS | Mod: S$GLB,,, | Performed by: PHYSICIAN ASSISTANT

## 2019-08-06 PROCEDURE — 93000 EKG 12-LEAD: ICD-10-PCS | Mod: S$GLB,,, | Performed by: INTERNAL MEDICINE

## 2019-08-06 PROCEDURE — 89240 UNLISTED MISC PATH TEST: CPT | Mod: S$GLB,,, | Performed by: PHYSICIAN ASSISTANT

## 2019-08-06 PROCEDURE — 99080 OSHA QUESTIONNAIRE: ICD-10-PCS | Mod: S$GLB,,, | Performed by: PHYSICIAN ASSISTANT

## 2019-08-06 PROCEDURE — 86140 C-REACTIVE PROTEIN: CPT | Mod: S$GLB,,, | Performed by: PHYSICIAN ASSISTANT

## 2019-08-06 PROCEDURE — 99080 SPECIAL REPORTS OR FORMS: CPT | Mod: S$GLB,,, | Performed by: PHYSICIAN ASSISTANT

## 2019-08-06 PROCEDURE — 99000 PR SPECIMEN HANDLING,DR OFF->LAB: ICD-10-PCS | Mod: S$GLB,,, | Performed by: PHYSICIAN ASSISTANT

## 2019-08-06 PROCEDURE — 84443 TSH: ICD-10-PCS | Mod: QW,S$GLB,, | Performed by: PHYSICIAN ASSISTANT

## 2019-08-06 PROCEDURE — 94010 BREATHING CAPACITY TEST: CPT | Mod: S$GLB,,, | Performed by: PHYSICIAN ASSISTANT

## 2019-08-07 LAB
ALBUMIN SERPL-MCNC: 4.2 G/DL (ref 3.5–5.5)
ALBUMIN/GLOB SERPL: 1.4 {RATIO} (ref 1.2–2.2)
ALP SERPL-CCNC: 122 IU/L (ref 39–117)
ALT SERPL-CCNC: 129 IU/L (ref 0–32)
APPEARANCE UR: CLEAR
AST SERPL-CCNC: 104 IU/L (ref 0–40)
BACTERIA #/AREA URNS HPF: ABNORMAL /[HPF]
BASOPHILS # BLD AUTO: 0 X10E3/UL (ref 0–0.2)
BASOPHILS NFR BLD AUTO: 1 %
BILIRUB SERPL-MCNC: 0.3 MG/DL (ref 0–1.2)
BILIRUB UR QL STRIP: NEGATIVE
BUN SERPL-MCNC: 14 MG/DL (ref 6–24)
BUN/CREAT SERPL: 19 (ref 9–23)
CALCIUM SERPL-MCNC: 9.1 MG/DL (ref 8.7–10.2)
CHLORIDE SERPL-SCNC: 100 MMOL/L (ref 96–106)
CHOLEST SERPL-MCNC: 177 MG/DL (ref 100–199)
CO2 SERPL-SCNC: 21 MMOL/L (ref 20–29)
COLOR UR: YELLOW
CREAT SERPL-MCNC: 0.74 MG/DL (ref 0.57–1)
CRP SERPL-MCNC: 25 MG/L (ref 0–10)
EOSINOPHIL # BLD AUTO: 0.2 X10E3/UL (ref 0–0.4)
EOSINOPHIL NFR BLD AUTO: 2 %
EPI CELLS #/AREA URNS HPF: ABNORMAL /HPF (ref 0–10)
ERYTHROCYTE [DISTWIDTH] IN BLOOD BY AUTOMATED COUNT: 15.3 % (ref 12.3–15.4)
GGT SERPL-CCNC: 72 IU/L (ref 0–60)
GLOBULIN SER CALC-MCNC: 3.1 G/DL (ref 1.5–4.5)
GLUCOSE SERPL-MCNC: 149 MG/DL (ref 65–99)
GLUCOSE UR QL: NEGATIVE
HCT VFR BLD AUTO: 41.6 % (ref 34–46.6)
HDLC SERPL-MCNC: 41 MG/DL
HGB BLD-MCNC: 13 G/DL (ref 11.1–15.9)
HGB UR QL STRIP: NEGATIVE
IMM GRANULOCYTES # BLD AUTO: 0 X10E3/UL (ref 0–0.1)
IMM GRANULOCYTES NFR BLD AUTO: 0 %
KETONES UR QL STRIP: NEGATIVE
LDLC SERPL CALC-MCNC: 87 MG/DL (ref 0–99)
LEUKOCYTE ESTERASE UR QL STRIP: ABNORMAL
LYMPHOCYTES # BLD AUTO: 1.7 X10E3/UL (ref 0.7–3.1)
LYMPHOCYTES NFR BLD AUTO: 20 %
MCH RBC QN AUTO: 27.7 PG (ref 26.6–33)
MCHC RBC AUTO-ENTMCNC: 31.3 G/DL (ref 31.5–35.7)
MCV RBC AUTO: 89 FL (ref 79–97)
MICRO URNS: ABNORMAL
MONOCYTES # BLD AUTO: 0.6 X10E3/UL (ref 0.1–0.9)
MONOCYTES NFR BLD AUTO: 7 %
MUCOUS THREADS URNS QL MICRO: PRESENT
NEUTROPHILS # BLD AUTO: 6 X10E3/UL (ref 1.4–7)
NEUTROPHILS NFR BLD AUTO: 70 %
NITRITE UR QL STRIP: NEGATIVE
PH UR STRIP: 6 [PH] (ref 5–7.5)
PLATELET # BLD AUTO: 314 X10E3/UL (ref 150–450)
POTASSIUM SERPL-SCNC: 4.4 MMOL/L (ref 3.5–5.2)
PROT SERPL-MCNC: 7.3 G/DL (ref 6–8.5)
PROT UR QL STRIP: NEGATIVE
RBC # BLD AUTO: 4.69 X10E6/UL (ref 3.77–5.28)
RBC #/AREA URNS HPF: ABNORMAL /HPF (ref 0–2)
SODIUM SERPL-SCNC: 138 MMOL/L (ref 134–144)
SP GR UR: 1.02 (ref 1–1.03)
TRIGL SERPL-MCNC: 247 MG/DL (ref 0–149)
TSH SERPL DL<=0.005 MIU/L-ACNC: 1.36 UIU/ML (ref 0.45–4.5)
UROBILINOGEN UR STRIP-MCNC: 0.2 MG/DL (ref 0.2–1)
VLDLC SERPL CALC-MCNC: 49 MG/DL (ref 5–40)
WBC # BLD AUTO: 8.4 X10E3/UL (ref 3.4–10.8)
WBC #/AREA URNS HPF: ABNORMAL /HPF (ref 0–5)

## 2019-08-08 ENCOUNTER — TELEPHONE (OUTPATIENT)
Dept: URGENT CARE | Facility: CLINIC | Age: 39
End: 2019-08-08

## 2019-08-08 ENCOUNTER — TELEPHONE (OUTPATIENT)
Dept: INTERNAL MEDICINE | Facility: CLINIC | Age: 39
End: 2019-08-08

## 2019-08-08 NOTE — TELEPHONE ENCOUNTER
----- Message from Nicolette Villalta sent at 8/8/2019  2:35 PM CDT -----  Contact: self/[phone]  Pt would like to get recent blood work done over states that she was not fasting Please put in orders. Pt has apt 8/15. Please advise.        Thank You

## 2019-08-08 NOTE — TELEPHONE ENCOUNTER
----- Message from Nicolette Villalta sent at 8/8/2019  2:35 PM CDT -----  Contact: self/[phone]  Pt would like to get recent blood work done over states that she was not fasting Please put in orders. Pt has apt 8/15. Please advise.        Thank You

## 2019-08-08 NOTE — TELEPHONE ENCOUNTER
Pt returned call. I discussed abnormal labs . Pt admits she did not fast before blood draw. Informed pt to f/u with pcp for evaluation. Pt was appreciative.

## 2019-08-09 ENCOUNTER — PATIENT MESSAGE (OUTPATIENT)
Dept: INTERNAL MEDICINE | Facility: CLINIC | Age: 39
End: 2019-08-09

## 2019-08-09 DIAGNOSIS — R73.01 IFG (IMPAIRED FASTING GLUCOSE): Primary | ICD-10-CM

## 2019-08-09 DIAGNOSIS — R74.8 ELEVATED LIVER ENZYMES: ICD-10-CM

## 2019-08-09 DIAGNOSIS — D53.9 NUTRITIONAL ANEMIA: ICD-10-CM

## 2019-08-09 DIAGNOSIS — R53.83 FATIGUE, UNSPECIFIED TYPE: ICD-10-CM

## 2019-08-09 DIAGNOSIS — E55.9 MILD VITAMIN D DEFICIENCY: ICD-10-CM

## 2019-08-15 ENCOUNTER — OFFICE VISIT (OUTPATIENT)
Dept: INTERNAL MEDICINE | Facility: CLINIC | Age: 39
End: 2019-08-15
Payer: COMMERCIAL

## 2019-08-15 VITALS
DIASTOLIC BLOOD PRESSURE: 70 MMHG | HEIGHT: 63 IN | BODY MASS INDEX: 40.62 KG/M2 | SYSTOLIC BLOOD PRESSURE: 120 MMHG | WEIGHT: 229.25 LBS

## 2019-08-15 DIAGNOSIS — Z00.00 ANNUAL PHYSICAL EXAM: Primary | ICD-10-CM

## 2019-08-15 DIAGNOSIS — R73.01 IFG (IMPAIRED FASTING GLUCOSE): ICD-10-CM

## 2019-08-15 DIAGNOSIS — E66.01 MORBID OBESITY: ICD-10-CM

## 2019-08-15 DIAGNOSIS — K76.0 FATTY LIVER: ICD-10-CM

## 2019-08-15 DIAGNOSIS — R06.83 SNORING: ICD-10-CM

## 2019-08-15 PROCEDURE — 3078F PR MOST RECENT DIASTOLIC BLOOD PRESSURE < 80 MM HG: ICD-10-PCS | Mod: CPTII,S$GLB,, | Performed by: INTERNAL MEDICINE

## 2019-08-15 PROCEDURE — 99396 PR PREVENTIVE VISIT,EST,40-64: ICD-10-PCS | Mod: S$GLB,,, | Performed by: INTERNAL MEDICINE

## 2019-08-15 PROCEDURE — 3078F DIAST BP <80 MM HG: CPT | Mod: CPTII,S$GLB,, | Performed by: INTERNAL MEDICINE

## 2019-08-15 PROCEDURE — 99999 PR PBB SHADOW E&M-EST. PATIENT-LVL IV: CPT | Mod: PBBFAC,,, | Performed by: INTERNAL MEDICINE

## 2019-08-15 PROCEDURE — 3074F PR MOST RECENT SYSTOLIC BLOOD PRESSURE < 130 MM HG: ICD-10-PCS | Mod: CPTII,S$GLB,, | Performed by: INTERNAL MEDICINE

## 2019-08-15 PROCEDURE — 3074F SYST BP LT 130 MM HG: CPT | Mod: CPTII,S$GLB,, | Performed by: INTERNAL MEDICINE

## 2019-08-15 PROCEDURE — 99999 PR PBB SHADOW E&M-EST. PATIENT-LVL IV: ICD-10-PCS | Mod: PBBFAC,,, | Performed by: INTERNAL MEDICINE

## 2019-08-15 PROCEDURE — 99396 PREV VISIT EST AGE 40-64: CPT | Mod: S$GLB,,, | Performed by: INTERNAL MEDICINE

## 2019-08-15 RX ORDER — MINERAL OIL
ENEMA (ML) RECTAL
COMMUNITY
End: 2020-07-07

## 2019-08-15 RX ORDER — ALPRAZOLAM 0.25 MG/1
TABLET ORAL
COMMUNITY
Start: 2018-07-12 | End: 2020-03-16 | Stop reason: SDUPTHER

## 2019-08-15 RX ORDER — LANCETS 26 GAUGE
EACH MISCELLANEOUS
COMMUNITY
End: 2020-10-12

## 2019-08-15 NOTE — PATIENT INSTRUCTIONS
Nonalcoholic Fatty Liver Disease (NAFLD)  Nonalcoholic fatty liver disease (NAFLD) is a common disease of the liver. It occurs when you have too much fat in the liver. If NAFLD is severe, it can cause liver damage that seems like the damage caused by drinking too much alcohol. But NAFLD is not caused by drinking alcohol. This sheet tells you more about NAFLD and how it can be managed.    How the liver works   The liver is an organ in the upper right side of the belly (abdomen). It has many important jobs. These include:  · Breaking down (metabolizing) proteins, carbohydrates, and fats  · Making a substance called bile that helps break down fats  · Storing and releasing sugar (glucose) into the blood to give the body energy  · Removing toxins from the blood  · Helping with blood clotting  Understanding NAFLD  A healthy liver may contain some fat. But if too much fat builds up in the liver, this causes NAFLD. NAFLD can be mild, causing fatty liver. Or it can be more severe and show inflammation, as well as the fat. This can cause non-alcoholic steatohepatitis (ALBERTO).  · Fatty liver. With fatty liver, the liver simply has more fat than normal. This extra fat usually does not harm the liver.  · ALBERTO. With ALBERTO, the fatty liver becomes inflamed over time. ALBERTO is serious because it can lead to scarring of the liver (fibrosis). Over time, the scarring may lead to cirrhosis of the liver. This can eventually cause liver failure or liver cancer.  Causes and risk factors of NAFLD  Doctors don't know what causes NAFLD. But certain things make the problem more likely to happen. These include:  · Obesity  · Prediabetes or diabetes  · High levels of fat found in the blood (cholesterol and triglycerides)  · Being exposed to certain medicines   Symptoms of NAFLD  Most people with NAFLD have no symptoms. If symptoms do occur, they can include:  · Tiredness  · Weakness  · Weight loss  · Loss of appetite  · Nausea and  vomiting  · Belly pain and cramping  · Yellowing of the skin and eyes (jaundice), as well as dark urine, or light-colored stools  · Swelling in the belly or legs  Diagnosing NAFLD  Your healthcare provider may think you have NAFLD if routine blood tests show high levels of liver enzymes. This may mean you have a liver problem. You may need one or more imaging tests, such as an ultrasound, CT, or MRI. You may need more blood tests to look for other causes of liver disease. You may also need a liver biopsy. During this test, a hollow needle is used to remove a tiny tissue sample from your liver. This tissue is then checked in a lab. This test can find signs of damage to liver tissue. It can also help figure out the cause of the damage and tell the difference between fatty liver and ALBERTO.  Treating NAFLD  Treatment for NAFLD varies for each person. The best early treatment is to treat any underlying conditions causing metabolic syndrome. This is the name for a group of conditions that includes:  · High blood pressure  · High levels of cholesterol and triglycerides  · Being overweight or obese  · Diabetes  Your healthcare provider will monitor your health and treat any symptoms or underlying health problems you have. Your provider will also work with you to control your risk factors. This will make liver damage less likely. In fact, treating those underlying conditions can often improve liver disease. You may need to take certain medicines, but no medicine will cure NAFLD. This is why treating the underlying conditions is most important. Your plan may include:  · Losing extra weight  · Getting regular exercise  · Controlling diabetes and high cholesterol or triglyceride levels  · Taking medicines and vitamins as prescribed by your provider  · Quitting smoking  · Not drinking alcohol  · Eating a healthy and balanced diet  Living with NAFLD  If NAFLD is caught early, it can be managed with treatment. Your healthcare  provider will discuss further treatment choices with you as needed.  Be sure to ask your provider about recommended vaccines. These include vaccines for viruses that can cause liver disease.  Date Last Reviewed: 12/1/2016 © 2000-2017 [employer]. [address] [place] [address], [place] [address]. All rights reserved. This information is not intended as a substitute for professional medical care. Always follow your healthcare professional's instructions.          Obstructive Sleep Apnea  Obstructive sleep apnea is a condition that causes your air passages to become narrowed or blocked during sleep. As a result, breathing stops for short periods. Your body wakes up enough for breathing to begin again, though you don't remember it. The cycle of stopped breathing and brief awakenings can repeat dozens of times a night. This prevents the body from getting to the deeper stages of sleep that are needed for good rest and may cause your body's oxygen level to fall.  Signs of sleep apnea include loud snoring, noisy breathing, and gasping sounds during sleep. Daytime symptoms include waking up tired after a full night's sleep, waking up with headaches, feeling very sleepy or falling asleep during the day, and having problems with memory or concentration.  Risk factors for sleep apnea include:  · Being overweight  · Being a man, or a woman in menopause  · Smoking  · Using alcohol or sedating medicines  · Having enlarged structures in the nose or throat  Home care  Lifestyle changes that can help treat snoring and sleep apnea include the following:  · If you are overweight, lose weight. Talk to your healthcare provider about a weight-loss plan for you.  · Avoid alcohol for 3 to 4 hours before bedtime. Avoid sedating medications. Ask your healthcare provider about the medicines you take.  · If you smoke, talk to your healthcare provider about ways to quit.  · Sleep on your side. This can help prevent gravity from pulling  relaxed throat tissues into your breathing passages.  · If you have allergies or sinus problems that block your nose, ask your healthcare provider for help.  Follow-up care  Follow up with your healthcare provider, or as advised. A diagnosis of sleep apnea is made with a sleep study. Your healthcare provider can tell you more about this test.  When to seek medical advice  Sleep apnea can make you more likely to have certain health problems. These include high blood pressure, heart attack, stroke, and sexual dysfunction. If you have sleep apnea, talk to your healthcare provider about the best treatments for you.  Date Last Reviewed: 4/1/2017  © 9189-2617 [employer]. [address] [place] [address], [place] [address]. All rights reserved. This information is not intended as a substitute for professional medical care. Always follow your healthcare professional's instructions.    [employer]         30

## 2019-08-15 NOTE — PROGRESS NOTES
Subjective:       Patient ID: Zo Martínez is a 39 y.o. female.    Chief Complaint: Annual exam    Has had some recent weight gain.  Had some blood work done through her work and liver blood work was significantly out of range.  Although she does not drink, she drink a lot while she was on a cruise recently.  She was seen by an outside physician and told she has fatty liver.  She is very worried about this.  In addition, glucose was elevated although these labs were not fasting.  She has had some increased thirst and increased urination.  She has had no unexplained weight loss.    Snores.   told her snoring is getting latter.  She has some daytime somnolence as well.  Sleep apnea was again reviewed.    Patient Active Problem List   Diagnosis    Anxiety    Gestational diabetes mellitus    B12 deficiency    Tourette's syndrome    Mild vitamin D deficiency    Family history of colon cancer: father age 55    Family history of breast cancer in mother    Depression    IFG (impaired fasting glucose)    Premature atrial contractions    Essential hypertension    Severe obesity (BMI 35.0-35.9 with comorbidity)    Nonsustained ventricular tachycardia       Answers for HPI/ROS submitted by the patient on 8/15/2019   activity change: No  unexpected weight change: No  neck pain: No  hearing loss: No  rhinorrhea: No  trouble swallowing: No  eye discharge: No  visual disturbance: No  chest tightness: No  wheezing: No  chest pain: No  palpitations: Yes  blood in stool: No  constipation: No  vomiting: No  diarrhea: No  polydipsia: No  polyuria: Yes  difficulty urinating: No  hematuria: No  menstrual problem: Yes  dysuria: No  joint swelling: No  arthralgias: No  headaches: Yes  weakness: No  confusion: No  dysphoric mood: No      Objective:      Physical Exam   Constitutional: She is oriented to person, place, and time. She appears well-developed and well-nourished.   HENT:   Head: Normocephalic and atraumatic.    Right Ear: External ear normal.   Left Ear: External ear normal.   Nose: Nose normal.   Mouth/Throat: Oropharynx is clear and moist. No oropharyngeal exudate.   Eyes: Conjunctivae and EOM are normal. No scleral icterus.   Neck: Normal range of motion. Neck supple. No JVD present. No thyromegaly present.   Cardiovascular: Normal rate, regular rhythm, normal heart sounds and intact distal pulses. Exam reveals no gallop.   No murmur heard.  Pulmonary/Chest: Effort normal and breath sounds normal. No respiratory distress. She has no wheezes.   Abdominal: Soft. Bowel sounds are normal. She exhibits no distension and no mass. There is no tenderness. There is no rebound and no guarding.   Musculoskeletal: Normal range of motion. She exhibits no edema or tenderness.   Lymphadenopathy:     She has no cervical adenopathy.   Neurological: She is alert and oriented to person, place, and time. She displays normal reflexes. No cranial nerve deficit. Coordination normal.   Skin: Skin is warm. No rash noted. No erythema.   Psychiatric: She has a normal mood and affect. Her behavior is normal. Judgment and thought content normal.   Nursing note and vitals reviewed.      Assessment:       1. Annual physical exam    2. Snoring    3. Morbid obesity    4. IFG (impaired fasting glucose)    5. Fatty liver        Plan:         Zo was seen today for follow-up.    Diagnoses and all orders for this visit:    Annual physical exam  -     hCG, quantitative; Future    Snoring  -     Ambulatory referral to Sleep Disorders  -     Polysomnography 4 or more parameters; Future    Morbid obesity  -     Ambulatory referral to Sleep Disorders  -     Polysomnography 4 or more parameters; Future  -     Ambulatory Referral to Diabetes Education  -     Ambulatory referral to Nutrition Services    IFG (impaired fasting glucose)  -     Ambulatory Referral to Diabetes Education  -     Ambulatory referral to Nutrition Services    Fatty liver  -      Ambulatory Referral to Hepatology     Avoid alcohol   Hydration, exercise and lifestyle modification reviewed, diet issues discussed at length   Sleep study   Sleep Clinic   Diabetes education   Dietitian   I recommended she consider the book [employer]   Get established in hepatology   Immunizations and Pap smear recommended   I will review all studies and determine further tx depending on findings- labs ordered already not done

## 2019-08-16 ENCOUNTER — LAB VISIT (OUTPATIENT)
Dept: LAB | Facility: HOSPITAL | Age: 39
End: 2019-08-16
Attending: INTERNAL MEDICINE
Payer: COMMERCIAL

## 2019-08-16 DIAGNOSIS — R53.83 FATIGUE, UNSPECIFIED TYPE: ICD-10-CM

## 2019-08-16 DIAGNOSIS — D53.9 NUTRITIONAL ANEMIA: ICD-10-CM

## 2019-08-16 DIAGNOSIS — R73.01 IFG (IMPAIRED FASTING GLUCOSE): ICD-10-CM

## 2019-08-16 DIAGNOSIS — E55.9 MILD VITAMIN D DEFICIENCY: ICD-10-CM

## 2019-08-16 DIAGNOSIS — R74.8 ELEVATED LIVER ENZYMES: ICD-10-CM

## 2019-08-16 LAB
25(OH)D3+25(OH)D2 SERPL-MCNC: 32 NG/ML (ref 30–96)
ALBUMIN SERPL BCP-MCNC: 3.8 G/DL (ref 3.5–5.2)
ALP SERPL-CCNC: 135 U/L (ref 55–135)
ALT SERPL W/O P-5'-P-CCNC: 145 U/L (ref 10–44)
ANION GAP SERPL CALC-SCNC: 11 MMOL/L (ref 8–16)
AST SERPL-CCNC: 100 U/L (ref 10–40)
BILIRUB SERPL-MCNC: 0.4 MG/DL (ref 0.1–1)
BUN SERPL-MCNC: 15 MG/DL (ref 6–20)
CALCIUM SERPL-MCNC: 9.5 MG/DL (ref 8.7–10.5)
CHLORIDE SERPL-SCNC: 104 MMOL/L (ref 95–110)
CO2 SERPL-SCNC: 24 MMOL/L (ref 23–29)
CREAT SERPL-MCNC: 0.8 MG/DL (ref 0.5–1.4)
EST. GFR  (AFRICAN AMERICAN): >60 ML/MIN/1.73 M^2
EST. GFR  (NON AFRICAN AMERICAN): >60 ML/MIN/1.73 M^2
ESTIMATED AVG GLUCOSE: 128 MG/DL (ref 68–131)
GLUCOSE SERPL-MCNC: 97 MG/DL (ref 70–110)
HAV IGM SERPL QL IA: NEGATIVE
HBA1C MFR BLD HPLC: 6.1 % (ref 4–5.6)
HBV CORE IGM SERPL QL IA: NEGATIVE
HBV SURFACE AG SERPL QL IA: NEGATIVE
HCV AB SERPL QL IA: NEGATIVE
POTASSIUM SERPL-SCNC: 4.4 MMOL/L (ref 3.5–5.1)
PROT SERPL-MCNC: 7.7 G/DL (ref 6–8.4)
SODIUM SERPL-SCNC: 139 MMOL/L (ref 136–145)
TSH SERPL DL<=0.005 MIU/L-ACNC: 0.92 UIU/ML (ref 0.4–4)
VIT B12 SERPL-MCNC: 613 PG/ML (ref 210–950)

## 2019-08-16 PROCEDURE — 82607 VITAMIN B-12: CPT

## 2019-08-16 PROCEDURE — 82306 VITAMIN D 25 HYDROXY: CPT

## 2019-08-16 PROCEDURE — 84443 ASSAY THYROID STIM HORMONE: CPT

## 2019-08-16 PROCEDURE — 80053 COMPREHEN METABOLIC PANEL: CPT

## 2019-08-16 PROCEDURE — 83036 HEMOGLOBIN GLYCOSYLATED A1C: CPT

## 2019-08-16 PROCEDURE — 36415 COLL VENOUS BLD VENIPUNCTURE: CPT | Mod: PO

## 2019-08-16 PROCEDURE — 80074 ACUTE HEPATITIS PANEL: CPT

## 2019-08-19 ENCOUNTER — TELEPHONE (OUTPATIENT)
Dept: INTERNAL MEDICINE | Facility: CLINIC | Age: 39
End: 2019-08-19

## 2019-08-19 ENCOUNTER — PATIENT MESSAGE (OUTPATIENT)
Dept: INTERNAL MEDICINE | Facility: CLINIC | Age: 39
End: 2019-08-19

## 2019-08-19 DIAGNOSIS — R74.8 ELEVATED LIVER ENZYMES: Primary | ICD-10-CM

## 2019-08-19 NOTE — TELEPHONE ENCOUNTER
Please let her know that her liver labs are slightly more out of range.  I would like for her to have an abdominal ultrasound and additional blood work before she sees the liver specialist at the beginning of September, orders are in for everything.  Please schedule and let me know.  Thank you

## 2019-08-20 NOTE — TELEPHONE ENCOUNTER
See previous note, needs all the labs that I ordered both yesterday and today and an abdominal ultrasound, thank you

## 2019-08-20 NOTE — TELEPHONE ENCOUNTER
----- Message from Paola Haskins NP sent at 8/20/2019 10:59 AM CDT -----  Hey!    Thanks for the heads up. It seems awfully high for fatty liver alone but its possible.     For a full serologic workup, I typically obtain the below labs:  CMP  INR  CK  PETH  IgG, IgM, Anti smooth muscle antibody, anti mitochondrial antibody, ROQUE  Ceruloplasmin  Iron/TIBC, ferritin  Alpha 1 antitrypsin level     If you are planning to do an US, I recommend a complete Abd so that we can see the spleen.     If she isn't able to do the above before the appt, I would be glad to arrange it after I see her. Its up to her.   If she does have labs done before, I recommend doing approx 1 week before because it takes a while for some of the labs, reese autoimmune markers, to come back    Thanks  Paola  ----- Message -----  From: Guillermina Zuniga MD  Sent: 8/19/2019   9:58 AM  To: NAIDA Strong    You are seeing her in September.  She has a new onset of very elevated transaminases, probably fatty liver but labs are actually getting worse rather than better.    No evidence of acute hepatitis    I am going to order an abdominal ultrasound prior to you seeing her.  I am happy to order all the labs that you would usually obtain prior to her visit so that you can review them with her.    Anti smooth muscle, ROQUE, ceruloplasmin, CK, aldolase.  Would you also recommend CMV, EBV?  Any other markers that you would like to have before her visit with you?  (I do not want make will work for you now but it might be helpful to have this when she sees you)    Thanks    Guillermina Zuniga

## 2019-08-22 NOTE — TELEPHONE ENCOUNTER
Can you please schedule all the labs that have been ordered in the last couple of weeks by me, as well as ultrasound, thank you

## 2019-08-23 ENCOUNTER — TELEPHONE (OUTPATIENT)
Dept: SLEEP MEDICINE | Facility: OTHER | Age: 39
End: 2019-08-23

## 2019-08-30 ENCOUNTER — HOSPITAL ENCOUNTER (OUTPATIENT)
Dept: RADIOLOGY | Facility: HOSPITAL | Age: 39
Discharge: HOME | End: 2019-08-30
Attending: INTERNAL MEDICINE
Payer: COMMERCIAL

## 2019-08-30 ENCOUNTER — TELEPHONE (OUTPATIENT)
Dept: INTERNAL MEDICINE | Facility: CLINIC | Age: 39
End: 2019-08-30

## 2019-08-30 ENCOUNTER — TELEPHONE (OUTPATIENT)
Dept: SLEEP MEDICINE | Facility: OTHER | Age: 39
End: 2019-08-30

## 2019-08-30 ENCOUNTER — PATIENT MESSAGE (OUTPATIENT)
Dept: INTERNAL MEDICINE | Facility: CLINIC | Age: 39
End: 2019-08-30

## 2019-08-30 DIAGNOSIS — R74.8 ELEVATED LIVER ENZYMES: ICD-10-CM

## 2019-08-30 PROCEDURE — 76705 US ABDOMEN LIMITED: ICD-10-PCS | Mod: 26,,, | Performed by: RADIOLOGY

## 2019-08-30 PROCEDURE — 76705 ECHO EXAM OF ABDOMEN: CPT | Mod: TC

## 2019-08-30 PROCEDURE — 76705 ECHO EXAM OF ABDOMEN: CPT | Mod: 26,,, | Performed by: RADIOLOGY

## 2019-08-30 NOTE — TELEPHONE ENCOUNTER
Liver labs are worse, unclear etiology  Unable to reach by phone, left a detailed voicemail.  Also sent a detailed MyChart message reviewing alarm symptoms and encouraging her to go to the emergency room should she develop any symptoms of fever, abdominal pain, jaundice or other changes.    She has an appointment next week with hepatology but certainly if symptoms occur in the interim she should not wait for routine appointment.

## 2019-09-03 ENCOUNTER — PATIENT MESSAGE (OUTPATIENT)
Dept: INTERNAL MEDICINE | Facility: CLINIC | Age: 39
End: 2019-09-03

## 2019-09-04 ENCOUNTER — PATIENT MESSAGE (OUTPATIENT)
Dept: INTERNAL MEDICINE | Facility: CLINIC | Age: 39
End: 2019-09-04

## 2019-09-06 ENCOUNTER — OFFICE VISIT (OUTPATIENT)
Dept: HEPATOLOGY | Facility: CLINIC | Age: 39
End: 2019-09-06
Payer: COMMERCIAL

## 2019-09-06 ENCOUNTER — LAB VISIT (OUTPATIENT)
Dept: LAB | Facility: HOSPITAL | Age: 39
End: 2019-09-06
Payer: COMMERCIAL

## 2019-09-06 VITALS
OXYGEN SATURATION: 96 % | HEART RATE: 74 BPM | DIASTOLIC BLOOD PRESSURE: 68 MMHG | SYSTOLIC BLOOD PRESSURE: 139 MMHG | HEIGHT: 64 IN | WEIGHT: 230.81 LBS | TEMPERATURE: 99 F | BODY MASS INDEX: 39.4 KG/M2 | RESPIRATION RATE: 18 BRPM

## 2019-09-06 DIAGNOSIS — R73.03 PRE-DIABETES: ICD-10-CM

## 2019-09-06 DIAGNOSIS — I10 ESSENTIAL HYPERTENSION: Chronic | ICD-10-CM

## 2019-09-06 DIAGNOSIS — K76.0 FATTY LIVER: ICD-10-CM

## 2019-09-06 DIAGNOSIS — R74.01 ELEVATED TRANSAMINASE LEVEL: Primary | ICD-10-CM

## 2019-09-06 DIAGNOSIS — R74.01 ELEVATED TRANSAMINASE LEVEL: ICD-10-CM

## 2019-09-06 LAB
ALBUMIN SERPL BCP-MCNC: 4 G/DL (ref 3.5–5.2)
ALP SERPL-CCNC: 147 U/L (ref 55–135)
ALT SERPL W/O P-5'-P-CCNC: 140 U/L (ref 10–44)
ANION GAP SERPL CALC-SCNC: 12 MMOL/L (ref 8–16)
AST SERPL-CCNC: 84 U/L (ref 10–40)
BASOPHILS # BLD AUTO: 0.08 K/UL (ref 0–0.2)
BASOPHILS NFR BLD: 0.9 % (ref 0–1.9)
BILIRUB SERPL-MCNC: 0.2 MG/DL (ref 0.1–1)
BUN SERPL-MCNC: 15 MG/DL (ref 6–20)
CALCIUM SERPL-MCNC: 9.3 MG/DL (ref 8.7–10.5)
CHLORIDE SERPL-SCNC: 104 MMOL/L (ref 95–110)
CO2 SERPL-SCNC: 22 MMOL/L (ref 23–29)
CREAT SERPL-MCNC: 0.8 MG/DL (ref 0.5–1.4)
DIFFERENTIAL METHOD: ABNORMAL
EOSINOPHIL # BLD AUTO: 0.2 K/UL (ref 0–0.5)
EOSINOPHIL NFR BLD: 1.8 % (ref 0–8)
ERYTHROCYTE [DISTWIDTH] IN BLOOD BY AUTOMATED COUNT: 13.7 % (ref 11.5–14.5)
EST. GFR  (AFRICAN AMERICAN): >60 ML/MIN/1.73 M^2
EST. GFR  (NON AFRICAN AMERICAN): >60 ML/MIN/1.73 M^2
GLUCOSE SERPL-MCNC: 115 MG/DL (ref 70–110)
HCG INTACT+B SERPL-ACNC: <1.2 MIU/ML
HCT VFR BLD AUTO: 42.8 % (ref 37–48.5)
HGB BLD-MCNC: 13.1 G/DL (ref 12–16)
IMM GRANULOCYTES # BLD AUTO: 0.01 K/UL (ref 0–0.04)
IMM GRANULOCYTES NFR BLD AUTO: 0.1 % (ref 0–0.5)
INR PPP: 0.9 (ref 0.8–1.2)
LYMPHOCYTES # BLD AUTO: 2.5 K/UL (ref 1–4.8)
LYMPHOCYTES NFR BLD: 29.9 % (ref 18–48)
MCH RBC QN AUTO: 27.3 PG (ref 27–31)
MCHC RBC AUTO-ENTMCNC: 30.6 G/DL (ref 32–36)
MCV RBC AUTO: 89 FL (ref 82–98)
MONOCYTES # BLD AUTO: 0.5 K/UL (ref 0.3–1)
MONOCYTES NFR BLD: 6 % (ref 4–15)
NEUTROPHILS # BLD AUTO: 5.2 K/UL (ref 1.8–7.7)
NEUTROPHILS NFR BLD: 61.3 % (ref 38–73)
NRBC BLD-RTO: 0 /100 WBC
PLATELET # BLD AUTO: 278 K/UL (ref 150–350)
PMV BLD AUTO: 11.6 FL (ref 9.2–12.9)
POTASSIUM SERPL-SCNC: 4 MMOL/L (ref 3.5–5.1)
PROT SERPL-MCNC: 8 G/DL (ref 6–8.4)
PROTHROMBIN TIME: 9.7 SEC (ref 9–12.5)
RBC # BLD AUTO: 4.79 M/UL (ref 4–5.4)
SODIUM SERPL-SCNC: 138 MMOL/L (ref 136–145)
WBC # BLD AUTO: 8.43 K/UL (ref 3.9–12.7)

## 2019-09-06 PROCEDURE — 3078F PR MOST RECENT DIASTOLIC BLOOD PRESSURE < 80 MM HG: ICD-10-PCS | Mod: CPTII,S$GLB,, | Performed by: NURSE PRACTITIONER

## 2019-09-06 PROCEDURE — 3078F DIAST BP <80 MM HG: CPT | Mod: CPTII,S$GLB,, | Performed by: NURSE PRACTITIONER

## 2019-09-06 PROCEDURE — 99999 PR PBB SHADOW E&M-EST. PATIENT-LVL IV: ICD-10-PCS | Mod: PBBFAC,,, | Performed by: NURSE PRACTITIONER

## 2019-09-06 PROCEDURE — 86706 HEP B SURFACE ANTIBODY: CPT

## 2019-09-06 PROCEDURE — 3075F SYST BP GE 130 - 139MM HG: CPT | Mod: CPTII,S$GLB,, | Performed by: NURSE PRACTITIONER

## 2019-09-06 PROCEDURE — 84702 CHORIONIC GONADOTROPIN TEST: CPT

## 2019-09-06 PROCEDURE — 85610 PROTHROMBIN TIME: CPT

## 2019-09-06 PROCEDURE — 3008F PR BODY MASS INDEX (BMI) DOCUMENTED: ICD-10-PCS | Mod: CPTII,S$GLB,, | Performed by: NURSE PRACTITIONER

## 2019-09-06 PROCEDURE — 85025 COMPLETE CBC W/AUTO DIFF WBC: CPT

## 2019-09-06 PROCEDURE — 80321 ALCOHOLS BIOMARKERS 1OR 2: CPT

## 2019-09-06 PROCEDURE — 86704 HEP B CORE ANTIBODY TOTAL: CPT

## 2019-09-06 PROCEDURE — 86790 VIRUS ANTIBODY NOS: CPT

## 2019-09-06 PROCEDURE — 80053 COMPREHEN METABOLIC PANEL: CPT

## 2019-09-06 PROCEDURE — 99999 PR PBB SHADOW E&M-EST. PATIENT-LVL IV: CPT | Mod: PBBFAC,,, | Performed by: NURSE PRACTITIONER

## 2019-09-06 PROCEDURE — 3075F PR MOST RECENT SYSTOLIC BLOOD PRESS GE 130-139MM HG: ICD-10-PCS | Mod: CPTII,S$GLB,, | Performed by: NURSE PRACTITIONER

## 2019-09-06 PROCEDURE — 36415 COLL VENOUS BLD VENIPUNCTURE: CPT

## 2019-09-06 PROCEDURE — 99214 OFFICE O/P EST MOD 30 MIN: CPT | Mod: S$GLB,,, | Performed by: NURSE PRACTITIONER

## 2019-09-06 PROCEDURE — 3008F BODY MASS INDEX DOCD: CPT | Mod: CPTII,S$GLB,, | Performed by: NURSE PRACTITIONER

## 2019-09-06 PROCEDURE — 99214 PR OFFICE/OUTPT VISIT, EST, LEVL IV, 30-39 MIN: ICD-10-PCS | Mod: S$GLB,,, | Performed by: NURSE PRACTITIONER

## 2019-09-06 NOTE — PATIENT INSTRUCTIONS
1. Will check immunity markers for Hepatitis A and B and arrange for vaccination if needed  2. Labs today to recheck liver enzymes. If they are not improving, may need to consider liver biopsy   3.  Follow up pending results of above    There is no FDA approved therapy for non-alcoholic fatty liver disease. Therefore, these things are important:  1. Weight loss goal of 20-30 lbs  2. Low carb/sugar, high fiber and protein diet  3. Exercise, 5 days per week, 30 minutes per day, as tolerated  4. Recommend good cholesterol, blood pressure, blood sugar levels     In some people, fatty liver can progress to steatohepatitis (inflamatory fatty liver) and possibly to cirrhosis, putting one at increased risk for liver cancer, liver failure, and death. Therefore, the lifestyle changes are very important to decrease this risk.         Understanding the Liver Biopsy Procedure    A liver biopsy is a special procedure thats safe and quick. It can help your healthcare provider find out how healthy your liver is.  The Liver  The liver is a large organ in the upper right part of the abdominal cavity. A healthy liver metabolizes proteins, carbohydrates, and fats. It also makes a digestive fluid (bile) and removes blood toxins.  Who needs a liver biopsy?  A liver biopsy may be done if you have:  · Symptoms of a liver problem. These include yellowing skin and eyes or dark urine (jaundice) from infection, scarring, or damage from medicines.  · Abnormal liver imaging tests, blood tests, or liver enzymes  · A chronic liver condition, such as hepatitis or nonalcoholic fatty liver disease   · An abnormal liver scan  What a liver biopsy can do  A liver biopsy helps your healthcare provider diagnose a liver problem, such as cirrhosis or a fatty liver. He or she looks at your liver cells under the microscope. It also helps the healthcare provider in finding the cause of your liver problem and how serious it is.     Possible risks and  complications  Risks and complications can include the following:  · Infection  · Internal bleeding from the liver  · Bile leakage  · Rectal bleeding  · Pain  · Damage to organs near the liver. These include the lungs, gallbladder, kidneys, or intestines.  · Need for a second liver biopsy if not enough liver tissue was taken the first time   Date Last Reviewed: 7/1/2016  © 6404-2233 [employer]. [address] [place] [address]. All rights reserved. This information is not intended as a substitute for professional medical care. Always follow your healthcare professional's instructions.

## 2019-09-06 NOTE — PROGRESS NOTES
I have reviewed and concur with the EM's history, physical, assessment, and plan.  I have personally interviewed and examined the patient at bedside.  See below addendum for my evaluation and additional findings.     39 y.o. female that presents for evaluation of elevated liver tests.  Patient known to have fatty liver with previously normal liver tests, last in our system is 2017.  In 8/2019 she has had significant rise in transaminases.    Serologic work-up has been performed and negative.  Will trend labs at this time.      Discussed utility of liver biopsy if ongoing rise in liver tests    Patient will return to clinic with Paola Haskins NP

## 2019-09-06 NOTE — PROGRESS NOTES
[facility] VISIT NEW PT NOTE    REFERRING PROVIDER:  Dr. Guillermina Zuniga    CHIEF COMPLAINT: fatty liver, elevated liver enzymes    HPI: This is a 39 y.o. White female with PMH noted below, presenting for evaluation of fatty liver on US and   elevated liver enzymes.     Presents today alone    Diagnosis of fatty liver 1 year ago with fatty liver diagnosis    Cruise to [facility] - end of July. Increased alcohol use at that time but no period of heavy, daily or consistent alcohol intake in the recent past or chronically     No herbal medications, no new medications or med changes. No s/s of acute illness/infection. No antibiotic use     Labs done 8/30/19 show increasingly elevated transaminase levels (ALT > AST, increasing elevation since 8/2019, previously WNL/mild elevation in 7650-0772 )  Platelets and alk phos WNL  Synthetic liver functioning WNL    Lab Results   Component Value Date     (H) 08/30/2019     (H) 08/30/2019    ALKPHOS 113 08/30/2019    BILITOT 0.4 08/30/2019    ALBUMIN 4.2 08/30/2019    INR 0.9 08/30/2019     08/30/2019     Abd U/S done 8/30/19 showed hepatomegaly (20.6cm), fatty liver. No bile duct dilation. No splenomegaly    Previous serologic w/u negative for Marco's, alpha-1 antitrypsin deficiency, hemochromatosis, autoimmune etiology, and viral hepatitis A. B and C    Risk factors for NAFLD include obesity, HTN, pre-DM    Denies family history of liver disease . Denies alcohol consumption currently or in the past    Immunity to Hep A and B - will check today       Denies jaundice, dark urine, abdominal distention, hematemesis, melena, slowed mentation. No abnormal skin rashes. No generalized joint or muscle pain.      Review of patient's allergies indicates:  No Known Allergies    Current Outpatient Medications on File Prior to Visit   Medication Sig Dispense Refill    ALPRAZolam (XANAX) 0.25 MG tablet       fexofenadine (ALLEGRA ALLERGY) 180 MG  tablet Allegra      lancets (ACCU-CHEK FASTCLIX LANCET DRUM [facility]) Accu-Chek FastClix      lancing device with lancets Kit Accu-Chek FastClix Lancing Device      metoprolol succinate (TOPROL-XL) 25 MG 24 hr tablet TAKE 1 TABLET(25 MG) BY MOUTH EVERY DAY 30 tablet 6     No current facility-administered medications on file prior to visit.        PMHX:  has a past medical history of Anxiety, Family history of breast cancer in mother (4/18/2017), Family history of colon cancer: father age 55 (4/18/2017), Gestational diabetes mellitus, Obesity, Class I, BMI 30-34.9, Pre-diabetes (4/18/2017), Severe obesity (BMI 35.0-35.9 with comorbidity) (9/19/2017), Tourette's syndrome, and Transient elevated blood pressure.    PSHX:  has a past surgical history that includes Cholecystectomy and Dilation and curettage of uterus.    FAMILY HISTORY: Negative for liver disease, reviewed in EPIC    SOCIAL HISTORY:   Social History     Tobacco Use   Smoking Status Never Smoker   Smokeless Tobacco Never Used       Social History     Substance and Sexual Activity   Alcohol Use Yes    Frequency: Never    Drinks per session: Patient refused    Binge frequency: Never    Comment: rarely, socially, few times a year max        Social History     Substance and Sexual Activity   Drug Use No       ROS:   GENERAL: Denies fever, chills, weight loss/gain, fatigue  HEENT: Denies headaches, dizziness, vision/hearing changes  CARDIOVASCULAR: Denies chest pain, palpitations, or edema  RESPIRATORY: Denies dyspnea, cough  GI: Denies abdominal pain, rectal bleeding, nausea, vomiting. No change in bowel pattern or color  : Denies dysuria, hematuria   SKIN: Denies rash, itching   NEURO: Denies confusion, memory loss, or mood changes  PSYCH: Denies depression or anxiety  HEME/LYMPH: Denies easy bruising or bleeding    PHYSICAL EXAM:   Friendly White female, in no acute distress; alert and oriented to person, place and time  VITALS: /68 (BP Location:  "Left arm, Patient Position: Sitting, BP Method: Medium (Automatic))   Pulse 74   Temp 98.7 °F (37.1 °C) (Oral)   Resp 18   Ht 5' 4" (1.626 m)   Wt 104.7 kg (230 lb 13.2 oz)   SpO2 96%   BMI 39.62 kg/m²   HENT: Normocephalic, without obvious abnormality. Oral mucosa pink and moist. Dentition good.  EYES: Sclerae anicteric. No conjunctival pallor.   NECK: Supple. No masses or cervical adenopathy.  CARDIOVASCULAR: Regular rate and rhythm. No murmurs.  RESPIRATORY: Normal respiratory effort. BBS CTA. No wheezes or crackles.  GI: Soft, non-tender, non-distended. +  hepatomegaly. No masses palpable. No ascites.  EXTREMITIES:  No clubbing, cyanosis or edema.  SKIN: Warm and dry. No jaundice. No rashes noted to exposed skin. No telangectasias noted. No palmar erythema.  NEURO:  Normal gait. No asterixis.  PSYCH:  Memory intact. Thought and speech pattern appropriate. Behavior normal. No depression or anxiety noted.    DIAGNOSTIC STUDIES:    ABD. U/S-    Done 8/30/19  FINDINGS:  The liver is enlarged, measuring 20.6 cm and demonstrates diffuse increased parenchymal echogenicity and parenchymal sound attenuation with an elevated HRI of 1.95, consistent with fatty infiltration.  There is no intra or extrahepatic bile duct dilatation.  The common duct measures 4 mm.    The gallbladder is surgically absent.    The visualized portions of the pancreas are unremarkable.    The spleen measures 10.1 x 3.6 cm and is unremarkable.    There is no free fluid within the visualized abdomen.      Impression       Hepatomegaly and hepatic steatosis.       LIVER BIOPSY-   None   FIBROSCAN - will defer given significant liver enzyme elevation, will plan in the future when liver enzymes improve      ASSESSMENT:  39 y.o. White female with:  1. Elevated liver enzymes, increasing since early August - unclear etiology currently   -- ALT>AST, duration of liver enzyme elevation : at least since early August, previously normal in 2017  --- " synthetic liver function WNL  --- Abd US done 8/2019 showed hepatomegaly and fatty liver. No splenomegaly  -- do not suspect any medications contributing   --- previous serological work up : negative for Marco's, alpha-1 antitrypsin deficiency, hemochromatosis, autoimmune etiology, and viral hepatitis A. B and C  --- Hep A and B immunity: will check today     2. NAFLD  -- do not suspect current significantly increased liver enzyme elevation is due to fatty liver, likely transient process (as no significant weight gain, diabetes development, alcohol intake, etc)   -- noted on US, previously mildly elevated liver enzymes in 2017  -- risk factors include obesity, pre-DM, HTN  -- Body mass index is 39.62 kg/m².     EDUCATION:    Discussed need to repeat labs today. Full sero w/u negative. If remains significantly elevated without improvement, will need to consider US guided liver biopsy. If improving, can trend to see if improves without intervention     Discussed liver biopsy procedure and possible complications associated with liver biopsy including pain, bleeding, infection, and organ perforation. Reviewed the role of the procedure including confirming of diagnosis and staging of liver disease so pt can be appropriately followed from this point forward.    PLAN:  1.  Labs today  Orders Placed This Encounter   Procedures    Hepatitis A antibody, IgG    Hepatitis B core antibody, total    Hepatitis B surface antibody    CBC auto differential    Comprehensive metabolic panel    Protime-INR    Phosphatidylethanol (PETH)   2. Consider U/S guided biopsy to stage fibrosis and determine etiology if labs do not improve. If improving, will follow with trending labs  4. will arrange Hep A and B vaccines if needed   5. Follow up for pending results of workup.     Thank you for allowing me to participate in the care of Zo Martínez    SABINE Phelps    CC'ed note to:   Guillermina Zuniga MD

## 2019-09-06 NOTE — LETTER
September 6, 2019      Guillermina Zuniga MD  [address]  [place] [address]           [facility]  [address]  [place] [address]  Phone: [phone]  Fax: [phone]          Patient: Zo Martínez   MR Number: [id number]   YOB: 1980   Date of Visit: 9/6/2019       Dear Dr. Guillermina Zuniga:    Thank you for referring Zo Martínez to me for evaluation. Attached you will find relevant portions of my assessment and plan of care.    If you have questions, please do not hesitate to call me. I look forward to following Zo Martínez along with you.    Sincerely,    Paola Haskins, NP    Enclosure  CC:  No Recipients    If you would like to receive this communication electronically, please contact [email or web address] or [phone] to request more information on [employer] access.    For providers and/or their staff who would like to refer a patient to [facility], please contact us through our one-stop-shop provider referral line, [facility], at [phone].    If you feel you have received this communication in error or would no longer like to receive these types of communications, please e-mail [email or web address]

## 2019-09-09 ENCOUNTER — PATIENT MESSAGE (OUTPATIENT)
Dept: HEPATOLOGY | Facility: CLINIC | Age: 39
End: 2019-09-09

## 2019-09-09 LAB
HBV CORE AB SERPL QL IA: NEGATIVE
HBV SURFACE AB SER-ACNC: NEGATIVE M[IU]/ML
HEPATITIS A ANTIBODY, IGG: NEGATIVE

## 2019-09-10 ENCOUNTER — PATIENT MESSAGE (OUTPATIENT)
Dept: HEPATOLOGY | Facility: CLINIC | Age: 39
End: 2019-09-10

## 2019-09-10 DIAGNOSIS — Z23 NEED FOR HEPATITIS A AND B VACCINATION: Primary | ICD-10-CM

## 2019-09-17 ENCOUNTER — TELEPHONE (OUTPATIENT)
Dept: SLEEP MEDICINE | Facility: OTHER | Age: 39
End: 2019-09-17

## 2019-09-19 ENCOUNTER — DOCUMENTATION ONLY (OUTPATIENT)
Dept: HEPATOLOGY | Facility: CLINIC | Age: 39
End: 2019-09-19

## 2019-09-19 LAB — PHOSPHATIDYLETHANOL (PETH): NEGATIVE NG/ML

## 2019-09-19 NOTE — PROGRESS NOTES
Outside records received:    A1c 6.3%    2/2019    Mildly elevated liver enzymes (AST 41, ALT 69), alk phos 121    11/2018    All outside labs entered in EPIC

## 2019-09-25 ENCOUNTER — PATIENT MESSAGE (OUTPATIENT)
Dept: HEPATOLOGY | Facility: CLINIC | Age: 39
End: 2019-09-25

## 2019-09-27 ENCOUNTER — HOSPITAL ENCOUNTER (OUTPATIENT)
Dept: SLEEP MEDICINE | Facility: HOSPITAL | Age: 39
Discharge: HOME | End: 2019-09-27
Attending: INTERNAL MEDICINE
Payer: COMMERCIAL

## 2019-09-27 ENCOUNTER — PATIENT MESSAGE (OUTPATIENT)
Dept: SLEEP MEDICINE | Facility: CLINIC | Age: 39
End: 2019-09-27

## 2019-09-27 DIAGNOSIS — G47.33 OSA (OBSTRUCTIVE SLEEP APNEA): ICD-10-CM

## 2019-09-27 DIAGNOSIS — E66.01 MORBID OBESITY: ICD-10-CM

## 2019-09-27 DIAGNOSIS — R06.83 SNORING: ICD-10-CM

## 2019-09-27 PROCEDURE — 95810 POLYSOM 6/> YRS 4/> PARAM: CPT | Mod: 26,,, | Performed by: PSYCHIATRY & NEUROLOGY

## 2019-09-27 PROCEDURE — 95810 PR POLYSOMNOGRAPHY, 4 OR MORE: ICD-10-PCS | Mod: 26,,, | Performed by: PSYCHIATRY & NEUROLOGY

## 2019-09-27 PROCEDURE — 95810 POLYSOM 6/> YRS 4/> PARAM: CPT

## 2019-09-28 NOTE — PROGRESS NOTES
Education was done via explanation of sleep study process and procedure. All questions were answered.    Pt did not meet criteria for CPAP. Few respiratory events were observed. REM sleep was obtained in side position. Supine sleep was encouraged    Low sat of 93% was observed in study. EKG revealed NSR. Soft to moderate snoring was heardThank you letter was given in a.m.

## 2019-09-30 ENCOUNTER — TELEPHONE (OUTPATIENT)
Dept: SLEEP MEDICINE | Facility: CLINIC | Age: 39
End: 2019-09-30

## 2019-10-08 ENCOUNTER — PATIENT MESSAGE (OUTPATIENT)
Dept: INTERNAL MEDICINE | Facility: CLINIC | Age: 39
End: 2019-10-08

## 2019-10-08 NOTE — PROCEDURES
"Dear Doctor Efrain,    The sleep study that you ordered is complete.  You have ordered sleep LAB services to perform the sleep study for Zo Martínez.     Please find Sleep Study result in  the "Media tab" of Chart Review menu.       You can look  for the report in the  Media by the document type "Sleep Study Documents". Alphabetizing  "Document type" column helps to find the SLEEP STUDY report  Faster.      As the ordering provider, you are responsible for reviewing the results and implementing a treatment plan with your patient.    If you need a Sleep Medicine provider to explain the sleep study findings and arrange treatment for the patient, please refer patient for consultation to our Sleep Clinic via [facility] with Ambulatory Consult Sleep.    To do that please place an order for an  "Ambulatory Consult Sleep" - it will go to our clinic work queue for our Medical Assistant to contact the patient for an appointment.     For any questions, please contact our clinic staff at [phone] to talk to clinical staff        Shaunna Baca MD      "

## 2019-10-11 ENCOUNTER — PATIENT MESSAGE (OUTPATIENT)
Dept: INTERNAL MEDICINE | Facility: CLINIC | Age: 39
End: 2019-10-11

## 2019-10-11 NOTE — TELEPHONE ENCOUNTER
Please let her know that I printed her sleep study report, she can come pick it up or we can mail it to her, whatever is her preference.  Thank you

## 2019-10-22 DIAGNOSIS — I49.1 PREMATURE ATRIAL CONTRACTIONS: Primary | ICD-10-CM

## 2019-10-31 ENCOUNTER — PATIENT MESSAGE (OUTPATIENT)
Dept: HEPATOLOGY | Facility: CLINIC | Age: 39
End: 2019-10-31

## 2019-10-31 ENCOUNTER — LAB VISIT (OUTPATIENT)
Dept: LAB | Facility: HOSPITAL | Age: 39
End: 2019-10-31
Attending: NURSE PRACTITIONER
Payer: COMMERCIAL

## 2019-10-31 ENCOUNTER — OFFICE VISIT (OUTPATIENT)
Dept: ELECTROPHYSIOLOGY | Facility: CLINIC | Age: 39
End: 2019-10-31
Payer: COMMERCIAL

## 2019-10-31 ENCOUNTER — HOSPITAL ENCOUNTER (OUTPATIENT)
Dept: CARDIOLOGY | Facility: CLINIC | Age: 39
Discharge: HOME | End: 2019-10-31
Payer: COMMERCIAL

## 2019-10-31 VITALS
DIASTOLIC BLOOD PRESSURE: 76 MMHG | WEIGHT: 218.06 LBS | SYSTOLIC BLOOD PRESSURE: 126 MMHG | HEIGHT: 64 IN | BODY MASS INDEX: 37.23 KG/M2 | HEART RATE: 64 BPM

## 2019-10-31 DIAGNOSIS — I49.1 PREMATURE ATRIAL CONTRACTIONS: ICD-10-CM

## 2019-10-31 DIAGNOSIS — R00.2 PALPITATIONS: ICD-10-CM

## 2019-10-31 DIAGNOSIS — I10 ESSENTIAL HYPERTENSION: Chronic | ICD-10-CM

## 2019-10-31 DIAGNOSIS — E66.01 SEVERE OBESITY (BMI 35.0-35.9 WITH COMORBIDITY): ICD-10-CM

## 2019-10-31 DIAGNOSIS — I47.29 NONSUSTAINED VENTRICULAR TACHYCARDIA: Chronic | ICD-10-CM

## 2019-10-31 DIAGNOSIS — I49.1 PREMATURE ATRIAL CONTRACTIONS: Primary | Chronic | ICD-10-CM

## 2019-10-31 DIAGNOSIS — G47.33 OSA (OBSTRUCTIVE SLEEP APNEA): ICD-10-CM

## 2019-10-31 DIAGNOSIS — R74.01 ELEVATED TRANSAMINASE LEVEL: ICD-10-CM

## 2019-10-31 LAB
ALBUMIN SERPL BCP-MCNC: 4.1 G/DL (ref 3.5–5.2)
ALP SERPL-CCNC: 111 U/L (ref 55–135)
ALT SERPL W/O P-5'-P-CCNC: 65 U/L (ref 10–44)
ANION GAP SERPL CALC-SCNC: 8 MMOL/L (ref 8–16)
AST SERPL-CCNC: 44 U/L (ref 10–40)
BASOPHILS # BLD AUTO: 0.07 K/UL (ref 0–0.2)
BASOPHILS NFR BLD: 0.8 % (ref 0–1.9)
BILIRUB SERPL-MCNC: 0.3 MG/DL (ref 0.1–1)
BUN SERPL-MCNC: 14 MG/DL (ref 6–20)
CALCIUM SERPL-MCNC: 9.4 MG/DL (ref 8.7–10.5)
CHLORIDE SERPL-SCNC: 103 MMOL/L (ref 95–110)
CO2 SERPL-SCNC: 25 MMOL/L (ref 23–29)
CREAT SERPL-MCNC: 0.8 MG/DL (ref 0.5–1.4)
DIFFERENTIAL METHOD: ABNORMAL
EOSINOPHIL # BLD AUTO: 0.1 K/UL (ref 0–0.5)
EOSINOPHIL NFR BLD: 1.3 % (ref 0–8)
ERYTHROCYTE [DISTWIDTH] IN BLOOD BY AUTOMATED COUNT: 14.3 % (ref 11.5–14.5)
EST. GFR  (AFRICAN AMERICAN): >60 ML/MIN/1.73 M^2
EST. GFR  (NON AFRICAN AMERICAN): >60 ML/MIN/1.73 M^2
GLUCOSE SERPL-MCNC: 99 MG/DL (ref 70–110)
HCT VFR BLD AUTO: 44.9 % (ref 37–48.5)
HGB BLD-MCNC: 13.9 G/DL (ref 12–16)
IMM GRANULOCYTES # BLD AUTO: 0.01 K/UL (ref 0–0.04)
IMM GRANULOCYTES NFR BLD AUTO: 0.1 % (ref 0–0.5)
INR PPP: 0.9 (ref 0.8–1.2)
LYMPHOCYTES # BLD AUTO: 2 K/UL (ref 1–4.8)
LYMPHOCYTES NFR BLD: 23.6 % (ref 18–48)
MCH RBC QN AUTO: 27.3 PG (ref 27–31)
MCHC RBC AUTO-ENTMCNC: 31 G/DL (ref 32–36)
MCV RBC AUTO: 88 FL (ref 82–98)
MONOCYTES # BLD AUTO: 0.7 K/UL (ref 0.3–1)
MONOCYTES NFR BLD: 8.3 % (ref 4–15)
NEUTROPHILS # BLD AUTO: 5.7 K/UL (ref 1.8–7.7)
NEUTROPHILS NFR BLD: 65.9 % (ref 38–73)
NRBC BLD-RTO: 0 /100 WBC
PLATELET # BLD AUTO: 274 K/UL (ref 150–350)
PMV BLD AUTO: 11.7 FL (ref 9.2–12.9)
POTASSIUM SERPL-SCNC: 4 MMOL/L (ref 3.5–5.1)
PROT SERPL-MCNC: 7.9 G/DL (ref 6–8.4)
PROTHROMBIN TIME: 9.7 SEC (ref 9–12.5)
RBC # BLD AUTO: 5.1 M/UL (ref 4–5.4)
SODIUM SERPL-SCNC: 136 MMOL/L (ref 136–145)
WBC # BLD AUTO: 8.63 K/UL (ref 3.9–12.7)

## 2019-10-31 PROCEDURE — 80053 COMPREHEN METABOLIC PANEL: CPT

## 2019-10-31 PROCEDURE — 93005 ELECTROCARDIOGRAM TRACING: CPT | Mod: S$GLB,,, | Performed by: INTERNAL MEDICINE

## 2019-10-31 PROCEDURE — 93010 RHYTHM STRIP: ICD-10-PCS | Mod: S$GLB,,, | Performed by: INTERNAL MEDICINE

## 2019-10-31 PROCEDURE — 3074F SYST BP LT 130 MM HG: CPT | Mod: CPTII,S$GLB,, | Performed by: INTERNAL MEDICINE

## 2019-10-31 PROCEDURE — 85610 PROTHROMBIN TIME: CPT

## 2019-10-31 PROCEDURE — 3078F DIAST BP <80 MM HG: CPT | Mod: CPTII,S$GLB,, | Performed by: INTERNAL MEDICINE

## 2019-10-31 PROCEDURE — 3008F PR BODY MASS INDEX (BMI) DOCUMENTED: ICD-10-PCS | Mod: CPTII,S$GLB,, | Performed by: INTERNAL MEDICINE

## 2019-10-31 PROCEDURE — 85025 COMPLETE CBC W/AUTO DIFF WBC: CPT

## 2019-10-31 PROCEDURE — 3078F PR MOST RECENT DIASTOLIC BLOOD PRESSURE < 80 MM HG: ICD-10-PCS | Mod: CPTII,S$GLB,, | Performed by: INTERNAL MEDICINE

## 2019-10-31 PROCEDURE — 99214 PR OFFICE/OUTPT VISIT, EST, LEVL IV, 30-39 MIN: ICD-10-PCS | Mod: S$GLB,,, | Performed by: INTERNAL MEDICINE

## 2019-10-31 PROCEDURE — 36415 COLL VENOUS BLD VENIPUNCTURE: CPT

## 2019-10-31 PROCEDURE — 99999 PR PBB SHADOW E&M-EST. PATIENT-LVL III: CPT | Mod: PBBFAC,,, | Performed by: INTERNAL MEDICINE

## 2019-10-31 PROCEDURE — 99214 OFFICE O/P EST MOD 30 MIN: CPT | Mod: S$GLB,,, | Performed by: INTERNAL MEDICINE

## 2019-10-31 PROCEDURE — 3074F PR MOST RECENT SYSTOLIC BLOOD PRESSURE < 130 MM HG: ICD-10-PCS | Mod: CPTII,S$GLB,, | Performed by: INTERNAL MEDICINE

## 2019-10-31 PROCEDURE — 93010 ELECTROCARDIOGRAM REPORT: CPT | Mod: S$GLB,,, | Performed by: INTERNAL MEDICINE

## 2019-10-31 PROCEDURE — 99999 PR PBB SHADOW E&M-EST. PATIENT-LVL III: ICD-10-PCS | Mod: PBBFAC,,, | Performed by: INTERNAL MEDICINE

## 2019-10-31 PROCEDURE — 93005 RHYTHM STRIP: ICD-10-PCS | Mod: S$GLB,,, | Performed by: INTERNAL MEDICINE

## 2019-10-31 PROCEDURE — 3008F BODY MASS INDEX DOCD: CPT | Mod: CPTII,S$GLB,, | Performed by: INTERNAL MEDICINE

## 2019-10-31 RX ORDER — METOPROLOL SUCCINATE 25 MG/1
25 TABLET, EXTENDED RELEASE ORAL DAILY
Qty: 90 TABLET | Refills: 3 | Status: SHIPPED | OUTPATIENT
Start: 2019-10-31 | End: 2020-12-17 | Stop reason: SDUPTHER

## 2019-10-31 NOTE — PROGRESS NOTES
"Subjective:    Patient ID:  Zo Martínez is a 39 y.o. female who presents for follow-up of Palpitations    Referring Physician: Guillermina Zuniga MD    HPI  Prior Hx:  I had the pleasure of seeing Mrs. Martínez today in our electrophysiology clinic in consultation for her palpitations and chest pain.  As you are aware she is a pleasant 39 year-old woman with long-history of palpitations, hypertension, depression, and obesity.  She has noted for years to have episodes of skipped beats that occur without an identifiable trigger.  Some days are better than other days.  They are associated with a "sinking" or "dropping" sensation of her heart in her chest.  She has cut out the caffeine from her diet which has not effected her symptoms.  She also has occasional episodes of more sustained palpitations that last for a bit but <30 seconds.  Then recently she had an episode of squeezing/pushing chest pressure in the center of her chest while driving with associated shortness of breath that lasted for ~30 seconds.  A 48 hour holter monitor was performed.  Day 1 revealed 1 PAC.  Day 2 revealed 1108 PACs.  She noted that neither day was truly representative of her typical symptom burden.  Moreover she did not have any of the more "sustained" episodes while wearing the monitor.  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 She reports having early blood pressure in her family but does not believe anyone had a heart attack at a young age. She is a lifelong non-smoker. She is not physically active however wants to start exercising and lose weight.    Mrs. Martínez elected to wear a 14 day ZIO patch which noted rare PACS/PVCS, 1 6 beat run of AT, and 1 4 beat run of WCT, likely non-sustained VT.  There were however 49 patient triggered events with the majority correlating to sinus rhythm. We rx her atenolol as metoprolol had an interaction with bupropion. She had a treadmill exercise stress echo that " showed a structurally normal heart without any ischemic changes. She presents for follow-up. She reports feeling much better on atenolol and has very few palpitations. She reports she and her  do not prevent pregnancy.    Interim Hx:  Mrs. Mauricio cid for 1 year follow-up has been well. She has been dieting (low-carb) and has lost 15 pounds. She was diagnosed with ALBERTO 1 year ago and her LFTs were increasing. She recently had a sleep study and was diagnosed with mild sleep apnea. She continues taking metoprolol and tolerating it with occasional palpitations.     My interpretation of today's in clinic electrocardiogram is normal sinus rhythm.    Review of Systems   Constitution: Negative for malaise/fatigue.   HENT: Negative for congestion and sore throat.    Eyes: Negative for blurred vision and visual disturbance.   Cardiovascular: Positive for palpitations (better with metoprolol). Negative for chest pain, dyspnea on exertion, irregular heartbeat, leg swelling, near-syncope, orthopnea, paroxysmal nocturnal dyspnea and syncope.   Respiratory: Negative for cough and shortness of breath.    Hematologic/Lymphatic: Negative for bleeding problem. Does not bruise/bleed easily.   Skin: Negative.    Musculoskeletal: Negative.    Gastrointestinal: Negative for bloating and abdominal pain.   Neurological: Negative for dizziness, light-headedness and weakness.        Objective:    Physical Exam   Constitutional: She is oriented to person, place, and time. She appears well-developed and well-nourished. No distress.   HENT:   Head: Normocephalic and atraumatic.   Eyes: Conjunctivae are normal. Right eye exhibits no discharge. Left eye exhibits no discharge.   Neck: Neck supple. No JVD present.   Cardiovascular: Normal rate, regular rhythm and normal heart sounds. Exam reveals no gallop and no friction rub.   No murmur heard.  Pulmonary/Chest: Effort normal and breath sounds normal. No respiratory distress. She has no  wheezes. She has no rales.   Abdominal: Soft. Bowel sounds are normal. She exhibits no distension. There is no tenderness. There is no rebound.   Musculoskeletal: She exhibits no edema.   Neurological: She is alert and oriented to person, place, and time.   Skin: Skin is warm and dry. She is not diaphoretic.   Psychiatric: She has a normal mood and affect. Her behavior is normal. Judgment and thought content normal.   Vitals reviewed.        Assessment:       1. Premature atrial contractions    2. Nonsustained ventricular tachycardia    3. Essential hypertension    4. HANG (obstructive sleep apnea): mild per sleep study 10/19    5. Severe obesity (BMI 35.0-35.9 with comorbidity)         Plan:         In summary, Mrs. Martínez is a pleasant 39 year-old woman with long-history of palpitations, depression, HANG, ALBERTO, and obesity presenting for palpitations and chest discomfort.  Most of her symptoms correlate to sinus rhythm on her monitor.  However some may be from PACs/PVCs. She had 1 4 beat run of likely NSVT on her monitor. Her exercise stress echo was unremarkable for ischemia. Symptoms controlled on low-dose metoprolol. Continue same. RTC in 1 year, sooner if needed.    Thank you for allowing me to participate in the care of this patient. Please do not hesitate to call me with any questions or concerns.    Chance Martínez MD, PhD  Cardiac Electrophysiology

## 2019-12-12 ENCOUNTER — TELEPHONE (OUTPATIENT)
Dept: HEPATOLOGY | Facility: CLINIC | Age: 39
End: 2019-12-12

## 2019-12-12 ENCOUNTER — PATIENT MESSAGE (OUTPATIENT)
Dept: HEPATOLOGY | Facility: CLINIC | Age: 39
End: 2019-12-12

## 2019-12-12 ENCOUNTER — OCCUPATIONAL HEALTH (OUTPATIENT)
Dept: URGENT CARE | Facility: CLINIC | Age: 39
End: 2019-12-12

## 2019-12-12 DIAGNOSIS — Z02.83 ENCOUNTER FOR DRUG SCREENING: Primary | ICD-10-CM

## 2019-12-12 PROCEDURE — 80305 OOH COLLECTION ONLY DRUG SCREEN: ICD-10-PCS | Mod: S$GLB,,, | Performed by: FAMILY MEDICINE

## 2019-12-12 PROCEDURE — 80305 DRUG TEST PRSMV DIR OPT OBS: CPT | Mod: S$GLB,,, | Performed by: FAMILY MEDICINE

## 2019-12-12 NOTE — TELEPHONE ENCOUNTER
Attempted to contact patient and scheduled labs for tomorrow as requested by patient but patient did not answer. Left patient a voicemail stating the purpose for the call. Awaiting a call back.

## 2019-12-13 ENCOUNTER — PATIENT MESSAGE (OUTPATIENT)
Dept: HEPATOLOGY | Facility: CLINIC | Age: 39
End: 2019-12-13

## 2019-12-13 DIAGNOSIS — K76.0 FATTY LIVER: Primary | ICD-10-CM

## 2020-03-15 ENCOUNTER — PATIENT MESSAGE (OUTPATIENT)
Dept: INTERNAL MEDICINE | Facility: CLINIC | Age: 40
End: 2020-03-15

## 2020-03-16 RX ORDER — ALPRAZOLAM 0.25 MG/1
TABLET ORAL
Status: CANCELLED | OUTPATIENT
Start: 2020-03-16

## 2020-03-16 RX ORDER — ALPRAZOLAM 0.25 MG/1
0.25 TABLET ORAL NIGHTLY PRN
Qty: 30 TABLET | Refills: 0 | Status: SHIPPED | OUTPATIENT
Start: 2020-03-16 | End: 2020-07-07 | Stop reason: SDUPTHER

## 2020-03-25 ENCOUNTER — PATIENT MESSAGE (OUTPATIENT)
Dept: SLEEP MEDICINE | Facility: CLINIC | Age: 40
End: 2020-03-25

## 2020-03-27 ENCOUNTER — TELEPHONE (OUTPATIENT)
Dept: OPHTHALMOLOGY | Facility: CLINIC | Age: 40
End: 2020-03-27

## 2020-03-27 NOTE — TELEPHONE ENCOUNTER
Left a vm for patient to inform them that Dr. Howard is out sick, and to see if they qualify for my chart video. Also, let them know that someone will be contacting them.  TR 3/27/2020

## 2020-04-12 ENCOUNTER — PATIENT MESSAGE (OUTPATIENT)
Dept: INTERNAL MEDICINE | Facility: CLINIC | Age: 40
End: 2020-04-12

## 2020-04-12 DIAGNOSIS — G47.33 OSA (OBSTRUCTIVE SLEEP APNEA): Primary | ICD-10-CM

## 2020-04-13 NOTE — TELEPHONE ENCOUNTER
Jillian I am willing to order the CPAP for this lady however, I do not know what to put in to the option for auto CPAP range, can you help?

## 2020-05-27 ENCOUNTER — TELEPHONE (OUTPATIENT)
Dept: INTERNAL MEDICINE | Facility: CLINIC | Age: 40
End: 2020-05-27

## 2020-05-27 DIAGNOSIS — G47.33 OSA (OBSTRUCTIVE SLEEP APNEA): Primary | ICD-10-CM

## 2020-05-27 NOTE — TELEPHONE ENCOUNTER
----- Message from Nicole Herrera, JORDON sent at 5/27/2020  9:32 AM CDT -----  Regarding: Update script in Epic to Full Face Mask  Dr. Zuniga,    Patient called today stating that she is unable to tolerate a nasal mask, she feels like she is opening her mouth at night. She wants to be compliant and is requesting to be fit with a Full Face Mask. Can you please update her script in Epic to a Full Face mask.     Thank you,  Nicole Herrera, JORDON    [facility]  P: [phone]  F: [phone]  C: [phone]  Email: [email or web address]

## 2020-05-27 NOTE — TELEPHONE ENCOUNTER
jina Johnson order for sleep apnea CPAP supplies.  Please fax to [facility], see note below, thank you

## 2020-07-07 ENCOUNTER — TELEPHONE (OUTPATIENT)
Dept: INTERNAL MEDICINE | Facility: CLINIC | Age: 40
End: 2020-07-07

## 2020-07-07 ENCOUNTER — TELEPHONE (OUTPATIENT)
Dept: HEPATOLOGY | Facility: CLINIC | Age: 40
End: 2020-07-07

## 2020-07-07 ENCOUNTER — OFFICE VISIT (OUTPATIENT)
Dept: INTERNAL MEDICINE | Facility: CLINIC | Age: 40
End: 2020-07-07
Payer: COMMERCIAL

## 2020-07-07 VITALS
HEIGHT: 64 IN | DIASTOLIC BLOOD PRESSURE: 80 MMHG | WEIGHT: 229.25 LBS | BODY MASS INDEX: 39.14 KG/M2 | SYSTOLIC BLOOD PRESSURE: 120 MMHG

## 2020-07-07 DIAGNOSIS — F41.9 ANXIETY: Primary | ICD-10-CM

## 2020-07-07 DIAGNOSIS — R74.01 ELEVATED TRANSAMINASE LEVEL: ICD-10-CM

## 2020-07-07 DIAGNOSIS — G47.33 OSA (OBSTRUCTIVE SLEEP APNEA): ICD-10-CM

## 2020-07-07 DIAGNOSIS — K76.0 FATTY LIVER: ICD-10-CM

## 2020-07-07 DIAGNOSIS — K76.0 FATTY LIVER: Primary | ICD-10-CM

## 2020-07-07 DIAGNOSIS — I10 ESSENTIAL HYPERTENSION: Chronic | ICD-10-CM

## 2020-07-07 DIAGNOSIS — E66.01 SEVERE OBESITY (BMI 35.0-35.9 WITH COMORBIDITY): ICD-10-CM

## 2020-07-07 DIAGNOSIS — I47.29 NONSUSTAINED VENTRICULAR TACHYCARDIA: Chronic | ICD-10-CM

## 2020-07-07 PROCEDURE — 3074F SYST BP LT 130 MM HG: CPT | Mod: CPTII,S$GLB,, | Performed by: INTERNAL MEDICINE

## 2020-07-07 PROCEDURE — 3079F PR MOST RECENT DIASTOLIC BLOOD PRESSURE 80-89 MM HG: ICD-10-PCS | Mod: CPTII,S$GLB,, | Performed by: INTERNAL MEDICINE

## 2020-07-07 PROCEDURE — 3079F DIAST BP 80-89 MM HG: CPT | Mod: CPTII,S$GLB,, | Performed by: INTERNAL MEDICINE

## 2020-07-07 PROCEDURE — 3008F BODY MASS INDEX DOCD: CPT | Mod: CPTII,S$GLB,, | Performed by: INTERNAL MEDICINE

## 2020-07-07 PROCEDURE — 3008F PR BODY MASS INDEX (BMI) DOCUMENTED: ICD-10-PCS | Mod: CPTII,S$GLB,, | Performed by: INTERNAL MEDICINE

## 2020-07-07 PROCEDURE — 99214 PR OFFICE/OUTPT VISIT, EST, LEVL IV, 30-39 MIN: ICD-10-PCS | Mod: S$GLB,,, | Performed by: INTERNAL MEDICINE

## 2020-07-07 PROCEDURE — 3074F PR MOST RECENT SYSTOLIC BLOOD PRESSURE < 130 MM HG: ICD-10-PCS | Mod: CPTII,S$GLB,, | Performed by: INTERNAL MEDICINE

## 2020-07-07 PROCEDURE — 99999 PR PBB SHADOW E&M-EST. PATIENT-LVL III: ICD-10-PCS | Mod: PBBFAC,,, | Performed by: INTERNAL MEDICINE

## 2020-07-07 PROCEDURE — 99999 PR PBB SHADOW E&M-EST. PATIENT-LVL III: CPT | Mod: PBBFAC,,, | Performed by: INTERNAL MEDICINE

## 2020-07-07 PROCEDURE — 99214 OFFICE O/P EST MOD 30 MIN: CPT | Mod: S$GLB,,, | Performed by: INTERNAL MEDICINE

## 2020-07-07 RX ORDER — ALPRAZOLAM 0.25 MG/1
0.25 TABLET ORAL NIGHTLY PRN
Qty: 30 TABLET | Refills: 0 | Status: SHIPPED | OUTPATIENT
Start: 2020-07-07 | End: 2022-01-12 | Stop reason: SDUPTHER

## 2020-07-07 NOTE — TELEPHONE ENCOUNTER
Pt due for f/u in Sept. Please contact pt to schedule f/u with me in Sept with hepatic function panel lab a few days before and fibroscan the same day as appt     Thanks

## 2020-07-07 NOTE — TELEPHONE ENCOUNTER
----- Message from Paola Haskins NP sent at 7/7/2020 12:48 PM CDT -----  Thank so much! I sent a message to my staff to help get her scheduled for a f/u  Thanks!  ----- Message -----  From: Guillermina Zuniga MD  Sent: 7/7/2020   9:29 AM CDT  To: Paola Haskins NP    She is due for follow up fatty liver- do you need to see her or maybe a video visti?  Thanks    LB

## 2020-07-07 NOTE — PROGRESS NOTES
Subjective:       Patient ID: Zo Martínez is a 40 y.o. female.    Chief Complaint: Anxiety and Hypertension    Urgent visit    Anxiety, worse of late.  Long discussion.  Previous diagnosis of Tourette's and she felt Zoloft made this worse.  Wellbutrin suicidal thoughts.    Fatty liver worse again, 25 # weight gain.    BP stable    HANG, I ordered CPAP; some trouble keeping it on.    Seen in  10/19 1 year follow up    Gets Paps and mammograms outside of [facility].    Patient Active Problem List:     Anxiety     Gestational diabetes mellitus     B12 deficiency     Tourette's syndrome     Mild vitamin D deficiency     Family history of colon cancer: father age 55     Family history of breast cancer in mother     Depression     Pre-diabetes     Premature atrial contractions     Essential hypertension     Severe obesity (BMI 35.0-35.9 with comorbidity)     Nonsustained ventricular tachycardia     Fatty liver     Snoring     HANG (obstructive sleep apnea): mild per sleep study 10/19          Review of Systems   Constitutional: Positive for activity change. Negative for unexpected weight change.   HENT: Negative for hearing loss, rhinorrhea and trouble swallowing.    Eyes: Negative for discharge and visual disturbance.   Respiratory: Positive for apnea. Negative for chest tightness and wheezing.    Cardiovascular: Negative for chest pain and palpitations.        Stable sx   Gastrointestinal: Negative for blood in stool, constipation, diarrhea and vomiting.   Endocrine: Negative for polydipsia and polyuria.   Genitourinary: Positive for menstrual problem. Negative for difficulty urinating, dysuria and hematuria.        Periods light- spotting   Musculoskeletal: Negative for arthralgias, joint swelling and neck pain.   Neurological: Negative for weakness and headaches.   Psychiatric/Behavioral: Positive for dysphoric mood. Negative for confusion. The patient is nervous/anxious.        Objective:      Physical Exam  Vitals  signs and nursing note reviewed.   Constitutional:       Appearance: She is well-developed.   HENT:      Head: Normocephalic and atraumatic.      Right Ear: External ear normal.      Left Ear: External ear normal.      Nose: Nose normal.      Mouth/Throat:      Pharynx: No oropharyngeal exudate.   Eyes:      General: No scleral icterus.     Conjunctiva/sclera: Conjunctivae normal.   Neck:      Musculoskeletal: Normal range of motion and neck supple.      Thyroid: No thyromegaly.      Vascular: No JVD.   Cardiovascular:      Rate and Rhythm: Normal rate and regular rhythm.      Heart sounds: Normal heart sounds. No murmur. No gallop.    Pulmonary:      Effort: Pulmonary effort is normal. No respiratory distress.      Breath sounds: Normal breath sounds. No wheezing.   Abdominal:      General: Bowel sounds are normal. There is no distension.      Palpations: Abdomen is soft. There is no mass.      Tenderness: There is no abdominal tenderness. There is no guarding or rebound.   Musculoskeletal: Normal range of motion.         General: No tenderness.   Lymphadenopathy:      Cervical: No cervical adenopathy.   Skin:     General: Skin is warm.      Findings: No erythema or rash.   Neurological:      Mental Status: She is alert and oriented to person, place, and time.      Cranial Nerves: No cranial nerve deficit.      Coordination: Coordination normal.      Deep Tendon Reflexes: Reflexes normal.   Psychiatric:         Behavior: Behavior normal.         Thought Content: Thought content normal.         Judgment: Judgment normal.         Assessment:       1. Anxiety    2. Essential hypertension    3. Nonsustained ventricular tachycardia    4. Severe obesity (BMI 35.0-35.9 with comorbidity)    5. HANG (obstructive sleep apnea): mild per sleep study 10/19    6. Fatty liver        Plan:         Zo was seen today for anxiety and hypertension.    Diagnoses and all orders for this visit:    Anxiety:  Stable without alarm  symptoms.  She has done well with Xanax and uses very sparingly.   reviewed.  She has not done well with Wellbutrin in the past or Zoloft.  She is ambivalent about trying other SSRIs and Effexor may increase blood pressure.  I would like for her to get established for medication consultation in Psychiatry and she is willing to do so.  Exercise, sleep hygiene, stress reduction techniques reviewed, consider meditation, breathing exercises, prior, keeping a journal etc..  She is able to contract for safety.  [facility] referral to be placed    Essential hypertension:  Continue meds    Nonsustained ventricular tachycardia; continue meds    Severe obesity (BMI 35.0-35.9 with comorbidity):  Sleep apnea issues reviewed.  Exercise, sleep hygiene, lifestyle modification discussed at length.  She declines dietitian or bariatric assessment currently    HANG (obstructive sleep apnea): mild per sleep study 10/19  -     Ambulatory referral/consult to Sleep Disorders; Future    Fatty liver:  Chart has been sent to her hepatologist, due for follow-up currently    Labs reviewed    Anticipate follow-up with me within the next 1-2 months, sooner with problems in the interim    Other orders  -     ALPRAZolam (XANAX) 0.25 MG tablet; Take 1 tablet (0.25 mg total) by mouth nightly as needed for Anxiety.

## 2020-07-07 NOTE — TELEPHONE ENCOUNTER
----- Message from Guillermina Zuniga MD sent at 7/7/2020  9:29 AM CDT -----  She is due for follow up fatty liver- do you need to see her or maybe a video visti?  Thanks    LB

## 2020-07-07 NOTE — Clinical Note
Davin Go:  Slightly complicated case of a woman with anxiety, mild depression, Tourette's.  In the past, we try Zoloft which made her tics worse.  She said she got suicidal on Wellbutrin.  I have placed a referral for the community health worker to call her for [facility] for some counseling, but I think she would really benefit from a medication consultation as well in Psychiatry.  Can you help?  Thank you so much    Guillermina Zuniga

## 2020-07-08 ENCOUNTER — TELEPHONE (OUTPATIENT)
Dept: INTERNAL MEDICINE | Facility: CLINIC | Age: 40
End: 2020-07-08

## 2020-07-08 NOTE — TELEPHONE ENCOUNTER
Contacted patient and scheduled appointment as instructed. Sent reminders in the mail.     Instructions:     Pt due for f/u in Sept. Please contact pt to schedule f/u with me in Sept with hepatic function panel lab a few days before and fibroscan the same day as appt

## 2020-07-08 NOTE — TELEPHONE ENCOUNTER
----- Message from Abbi Venegas MD sent at 7/8/2020 12:25 PM CDT -----  Certainly.  I'll have my assistant give her a call to schedule an appointment with me.    Abbi Venegas    ----- Message -----  From: Guillermina Zuniga MD  Sent: 7/7/2020  12:30 PM CDT  To: Abbi Venegas MD    Hi Abbi:  Slightly complicated case of a woman with anxiety, mild depression, Tourette's.  In the past, we try Zoloft which made her tics worse.  She said she got suicidal on Wellbutrin.  I have placed a referral for the community health worker to call her for [facility] for some counseling, but I think she would really benefit from a medication consultation as well in Psychiatry.  Can you help?  Thank you so much    Guillermina Zuniga

## 2020-07-14 ENCOUNTER — TELEPHONE (OUTPATIENT)
Dept: BEHAVIORAL HEALTH | Facility: CLINIC | Age: 40
End: 2020-07-14

## 2020-07-14 NOTE — PROGRESS NOTES
CHW spoke with Zo Martínez pt stated that she has a appointment with  tomorrow and she would like to get more insight and her idea on the program to see if its something she needs. CHW will follow up with pt next week

## 2020-07-15 ENCOUNTER — OFFICE VISIT (OUTPATIENT)
Dept: PSYCHIATRY | Facility: CLINIC | Age: 40
End: 2020-07-15
Payer: COMMERCIAL

## 2020-07-15 DIAGNOSIS — F95.2 TOURETTE'S SYNDROME: ICD-10-CM

## 2020-07-15 DIAGNOSIS — F41.1 GAD (GENERALIZED ANXIETY DISORDER): Primary | ICD-10-CM

## 2020-07-15 DIAGNOSIS — F33.0 MILD EPISODE OF RECURRENT MAJOR DEPRESSIVE DISORDER: ICD-10-CM

## 2020-07-15 DIAGNOSIS — F42.8 OBSESSIVE THINKING: ICD-10-CM

## 2020-07-15 PROCEDURE — 90792 PR PSYCHIATRIC DIAGNOSTIC EVALUATION W/MEDICAL SERVICES: ICD-10-PCS | Mod: 95,,, | Performed by: INTERNAL MEDICINE

## 2020-07-15 PROCEDURE — 90792 PSYCH DIAG EVAL W/MED SRVCS: CPT | Mod: 95,,, | Performed by: INTERNAL MEDICINE

## 2020-07-15 RX ORDER — ESCITALOPRAM OXALATE 10 MG/1
TABLET ORAL
Qty: 30 TABLET | Refills: 3 | Status: SHIPPED | OUTPATIENT
Start: 2020-07-15 | End: 2020-08-26 | Stop reason: SDUPTHER

## 2020-07-15 NOTE — PROGRESS NOTES
OUTPATIENT PSYCHIATRY INITIAL VISIT    ENCOUNTER DATE:  7/15/2020  SITE:  [facility], [facility]  REFFERAL SOURCE:  Guillermina Zuniga MD  LENGTH OF SESSION:  40 minutes    The patient location is:  Home  The chief complaint leading to consultation is:  Anxiety, Depression    Visit type:  audiovisual    Face to Face time with patient:  40 minutes  45 minutes of total time spent on the encounter, which includes face to face time and non-face to face time preparing to see the patient (eg, review of tests), Obtaining and/or reviewing separately obtained history, Documenting clinical information in the electronic or other health record, Independently interpreting results (not separately reported) and communicating results to the patient/family/caregiver, or Care coordination (not separately reported).     Each patient to whom he or she provides medical services by telemedicine is:  (1) informed of the relationship between the physician and patient and the respective role of any other health care provider with respect to management of the patient; and (2) notified that he or she may decline to receive medical services by telemedicine and may withdraw from such care at any time.    CHIEF COMPLAINT:   Anxiety    HISTORY OF PRESENTING ILLNESS:  Zo Martíenz is a 40 y.o. female with history of Depression, Anxiety, Insomnia, and Tourette's who presents for initial assessment.    History as told by patient:  Reports history of anxiety that started when she was younger.  Diagnosed with mild Tourette's with tics.  This started around 6th-7th grade.  As she got in high school, OCD and anxiety got worse and tics actually got better.  Anxiety first came when her father was having some serious health issues.  Felt something in her head and thought she was having an aneurysm - panicked and thought she was about to pass out.  Did start having more panic attacks.  When [place] or senior in high school was put on Zoloft -  helped but brought back some nose twitching, although not that noticeable.  Was on that for a period of time.  Then met  and life was great and got off of it.  8 years ago had a daughter with down syndrome and stress/anxiety ramped up - also just becoming a new mom was difficult.  Has major health anxiety - started when she was in the ICU with her daughter.  Daughter is great now.  Now has a son who is 5 with ADHD, sometimes feels she is not being a good mom because she feels the weight of the world on her shoulders.  Does have some depression.  Has had some rage before where she yelled and didn't like it - not the type of person she is.  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  Mostly fear of her health is her issue.  Almost every day has panic attack - thinks something is going wrong and feels like she is going to die.  Even when she lost 40 pounds, anxiety was still health based.  Lost mother 2 years ago; lost dad when 20.  Has struggled with depression.  Saw her mother die and this had a lasting impact on her.  Depression is crying a lot, withdrawal from things she enjoys, no intimacy with .  Does not enjoy life anymore.  Never suicidal.  No longer enjoys the the things she used to - socializing, camping.  Always worried about something going wrong and couldn't enjoy the moment.  Depression on and off since daughter was born (maybe even before).  Had some issues with some relationships.  COVID has certainly worsened her anxiety.   is very social and doesn't want to nag him.  Regarding history of obsessions, used to in her head continuously think about how her parents would die.  Used to not be able to have any doors open - this has gotten better.  Likes cleanliness but does not have compulsions to clean.  Does check locks but not over and over.  Xanax first started for fear of flying.  Then pandemic happened and  needed something as needed.  Rarely takes this.  Denies history of maya, SI, HI, or AVH.      Medication side effects:  N/A  Medication compliance:  N/A    PSYCHIATRIC REVIEW OF SYSTEMS:  Trouble with sleep:  Occasional due to anxiety  Appetite changes:  Denies  Weight changes:  Denies  Lack of energy:  Denies  Anhedonia:  Yes  Somatic symptoms:  Denies  Libido:  Decreased  Anxiety/panic:  Yes  Guilty/hopeless:  Denies  Self-injurious behavior/risky behavior:  Denies  Any drugs:  Denies  Alcohol:  Denies    MEDICAL REVIEW OF SYSTEMS:  Complete review of systems performed covering Constitutional, Eyes, ENT/Mouth, Cardiovascular, Respiratory, Gastrointestinal, Genitourinary, Musculoskeletal, Skin, Neurologic, Endocrine, and Allergy/Immune.  All systems negative except for that discussed in HPI.    PAST PSYCHIATRIC HISTORY:  Previous Psychiatric Diagnoses:  Depression, Anxiety, OCD  Previous Psychiatric Hospitalizations:  Denies   Previous SI/HI:  Only when she was on Wellbutrin  Previous Suicide Attempts:  Denies   Previous Medication Trials:  Zoloft 100mg in high school (nose twitching, even at 50mg when retrying recently), Wellbutrin (suicidal)  Psychiatric Care (current & past):  Yes  History of Psychotherapy:  Minimal  History of Violence:  Denies    SUBSTANCE ABUSE HISTORY:  Tobacco:  Denies  Alcohol:  Denies, maybe 2 beers a year  Illicit Substances:  Denies  Misuse of Prescription Medications:  Denies    MEDICAL HISTORY:  Past Medical History:   Diagnosis Date    Anxiety     Family history of breast cancer in mother 4/18/2017    Family history of colon cancer: father age 55 4/18/2017    Fatty liver 9/6/2019    Gestational diabetes mellitus     Obesity, Class I, BMI 30-34.9     Pre-diabetes 4/18/2017    Severe obesity (BMI 35.0-35.9 with comorbidity) 9/19/2017    Tourette's syndrome     x- have subsided    Transient elevated blood pressure        NEUROLOGIC HISTORY:  Seizures:  Denies   Head trauma:   "Denies    SOCIAL HISTORY:  Developmental/Childhood:  Denies  History of Physical/Sexual Abuse:  Denies  Employment:  As above   Relationship Status/Sexual Orientation:   10 years  Children:  2 children as above   Housing Status:  Lives with family  Anabaptist:  Yes, Confucianist  Access to Gun:  Yes    FAMILY HISTORY:  Psychiatric:  Both 1/2 sisters (same dad) have depression.  One 1/2 sister with borderline personality disorder.  Their mother was psychotic.        H/o suicide:  Denies  Medical:   Father had blepharospasm.    MEDICATIONS:    Current Outpatient Medications:     ALPRAZolam (XANAX) 0.25 MG tablet, Take 1 tablet (0.25 mg total) by mouth nightly as needed for Anxiety., Disp: 30 tablet, Rfl: 0    lancets (ACCU-CHEK FASTCLIX LANCET DRUM [facility]), Accu-Chek FastClix, Disp: , Rfl:     lancing device with lancets Kit, Accu-Chek FastClix Lancing Device, Disp: , Rfl:     metoprolol succinate (TOPROL-XL) 25 MG 24 hr tablet, Take 1 tablet (25 mg total) by mouth once daily., Disp: 90 tablet, Rfl: 3    ALLERGIES:  Review of patient's allergies indicates:  No Known Allergies    PSYCHIATRIC EXAM:  There were no vitals filed for this visit.  Appearance:  Well groomed, appearing healthy and of stated age  Behavior:  Cooperative, pleasant, no psychomotor agitation or retardation  Speech:  Normal rate, rhythm, prosody, and volume  Mood:  "Anxious"  Affect:  Euthymic  Thought Process:  Linear, logical, goal directed  Thought Content:  Negative for suicidal ideation, homicidal ideation, delusions or hallucinations.  Associations:  Intact  Memory:  Grossly Intact  Level of Consciousness/Orientation:  Grossly intact  Fund of Knowledge:  Good  Attention:  Good  Language:  Fluent, able to name abstract and concrete objects  Insight:  Good  Judgment:  Intact  Psychomotor signs:  No tics of face on video today  Gait:  Unable to assess via virtual visit      RELEVANT LABS/STUDIES:  Lab Results   Component Value Date    WBC 9.30 " 07/06/2020    HGB 13.9 07/06/2020    HCT 42.2 07/06/2020    MCV 88 07/06/2020     07/06/2020     BMP  Lab Results   Component Value Date     07/06/2020    K 4.6 07/06/2020     07/06/2020    CO2 28 07/06/2020    BUN 17 07/06/2020    CREATININE 0.70 07/06/2020    CALCIUM 9.3 07/06/2020    ANIONGAP 8 10/31/2019    ESTGFRAFRICA >60 07/06/2020    EGFRNONAA >60 07/06/2020     Lab Results   Component Value Date    ALT 59 (H) 07/06/2020    AST 45 (H) 07/06/2020    GGT 72 (H) 08/06/2019    ALKPHOS 136 07/06/2020    BILITOT 0.5 07/06/2020     Lab Results   Component Value Date    TSH 1.50 07/06/2020     Lab Results   Component Value Date    HGBA1C 6.1 (H) 08/16/2019       IMPRESSION:    Zo Marítnez is a 40 y.o. female with history of Depression, Anxiety, Insomnia, and Tourette's who presents for initial assessment. who presents for initial assessment.    Status/Progress:  Based on the examination today, the patient's problem(s) is/are inadequately controlled.  New problems have been presented today.    Risk Parameters:  Patient reports no suicidal ideation  Patient reports no homicidal ideation  Patient reports no self-injurious behavior  Patient reports no violent behavior    DIAGNOSES:    ICD-10-CM ICD-9-CM   1. SKYE (generalized anxiety disorder)  F41.1 300.02   2. Obsessive thinking  F42.8 300.3   3. Mild episode of recurrent major depressive disorder  F33.0 296.31   4. Tourette's syndrome  F95.2 307.23     PLAN:  · Zoloft was helpful in the past for anxiety, however it worsened tics (primarily her nose).  It appears she was on Lexapro after the birth of her daughter, however she does not remember being on this or whether she had side effects.  Discussed that patients with Tourette's can respond differently to SSRI's - for some the medication worsens tics, for some it improves tics, and for others it makes no difference.  Discussed trying one more SSRI to see if she has worsening tics.  Will start  Lexapro 5mg x 1 week, then increase to 10mg daily.  She will let me know if she experiences side effects.  If tics worsen on Lexapro, discussed other options including Abilify.    · Discussed with patient informed consent, risks versus benefits, alternative treatments, side effect profile and the inherent unpredictability of individual responses to these treatments.  The patient expresses understanding of the above and displays the capacity to agree with this current plan.  · Patient plans to start [facility] Program for her anxiety.    RETURN TO CLINIC:  Follow up in about 4 weeks (around 8/12/2020).

## 2020-08-26 ENCOUNTER — OFFICE VISIT (OUTPATIENT)
Dept: PSYCHIATRY | Facility: CLINIC | Age: 40
End: 2020-08-26
Payer: COMMERCIAL

## 2020-08-26 DIAGNOSIS — F41.1 GAD (GENERALIZED ANXIETY DISORDER): Primary | ICD-10-CM

## 2020-08-26 DIAGNOSIS — F42.8 OBSESSIVE THINKING: ICD-10-CM

## 2020-08-26 DIAGNOSIS — F33.41 RECURRENT MAJOR DEPRESSIVE DISORDER, IN PARTIAL REMISSION: ICD-10-CM

## 2020-08-26 DIAGNOSIS — F95.2 TOURETTE'S SYNDROME: ICD-10-CM

## 2020-08-26 PROCEDURE — 99214 OFFICE O/P EST MOD 30 MIN: CPT | Mod: 95,,, | Performed by: INTERNAL MEDICINE

## 2020-08-26 PROCEDURE — 99214 PR OFFICE/OUTPT VISIT, EST, LEVL IV, 30-39 MIN: ICD-10-PCS | Mod: 95,,, | Performed by: INTERNAL MEDICINE

## 2020-08-26 RX ORDER — ESCITALOPRAM OXALATE 10 MG/1
TABLET ORAL
Qty: 30 TABLET | Refills: 3 | Status: SHIPPED | OUTPATIENT
Start: 2020-08-26 | End: 2021-09-21

## 2020-08-26 NOTE — PROGRESS NOTES
OUTPATIENT PSYCHIATRY RETURN VISIT    ENCOUNTER DATE:  8/30/2020  SITE:  [facility], [facility]  LENGTH OF SESSION:  12 minutes    The patient location is:  Home  The chief complaint leading to consultation is:  Anxiety    Visit type:  audiovisual    Face to Face time with patient:  12 minutes  14 minutes of total time spent on the encounter, which includes face to face time and non-face to face time preparing to see the patient (eg, review of tests), Obtaining and/or reviewing separately obtained history, Documenting clinical information in the electronic or other health record, Independently interpreting results (not separately reported) and communicating results to the patient/family/caregiver, or Care coordination (not separately reported).     Each patient to whom he or she provides medical services by telemedicine is:  (1) informed of the relationship between the physician and patient and the respective role of any other health care provider with respect to management of the patient; and (2) notified that he or she may decline to receive medical services by telemedicine and may withdraw from such care at any time.    CHIEF COMPLAINT:  Anxiety      HISTORY OF PRESENTING ILLNESS:  Zo Martínez is a 40 y.o. female with history of Depression, Anxiety, Insomnia, and Tourette's who presents for follow up appointment.      Plan at last appointment on 7/15/2020:  · Zoloft was helpful in the past for anxiety, however it worsened tics (primarily her nose).  It appears she was on Lexapro after the birth of her daughter, however she does not remember being on this or whether she had side effects.  Discussed that patients with Tourette's can respond differently to SSRI's - for some the medication worsens tics, for some it improves tics, and for others it makes no difference.  Discussed trying one more SSRI to see if she has worsening tics.  Will start Lexapro 5mg x 1 week, then increase to 10mg daily.  She  will let me know if she experiences side effects.  If tics worsen on Lexapro, discussed other options including Abilify.    · Discussed with patient informed consent, risks versus benefits, alternative treatments, side effect profile and the inherent unpredictability of individual responses to these treatments.  The patient expresses understanding of the above and displays the capacity to agree with this current plan.  · Patient plans to start Primary Care BHI Program for her anxiety.    History as told by patient:  Says she is doing great.  Depression has leveled out and not having anxiety at all.  No panic attacks.  A little more tired.  Sleeping well.  Health anxiety was her biggest thing - has had several things happen and didn't even get anxious.  Feeling much better.      Medication side effects:  No  Medication compliance:  Yes    PSYCHIATRIC REVIEW OF SYSTEMS:  Trouble with sleep:  Denies  Appetite changes:  Denies  Weight changes:  Denies  Lack of energy:  Denies  Anhedonia:  Denies  Somatic symptoms:  Denies  Libido:  Decreased  Anxiety/panic:  Denies  Guilty/hopeless:  Denies  Self-injurious behavior/risky behavior:  Denies  Any drugs:  Denies  Alcohol:  Denies    MEDICAL REVIEW OF SYSTEMS:  Complete review of systems performed covering Constitutional, Musculoskeletal, Neurologic.  All systems negative except for that covered in HPI.    PAST PSYCHIATRIC, MEDICAL, AND SOCIAL HISTORY REVIEWED  The patient's past medical, family and social history have been reviewed and updated as appropriate within the electronic medical record - see encounter notes.    MEDICATIONS:    Current Outpatient Medications:     ALPRAZolam (XANAX) 0.25 MG tablet, Take 1 tablet (0.25 mg total) by mouth nightly as needed for Anxiety., Disp: 30 tablet, Rfl: 0    escitalopram oxalate (LEXAPRO) 10 MG tablet, Take 1/2 tablet (5mg) daily x 7 days.  If tolerating, can then increase to 1 tablet (10mg) daily thereafter., Disp: 30 tablet,  "Rfl: 3    lancets (ACCU-CHEK FASTCLIX LANCET DR[facility]), Accu-Chek FastClix, Disp: , Rfl:     lancing device with lancets Kit, Accu-Chek FastClix Lancing Device, Disp: , Rfl:     metoprolol succinate (TOPROL-XL) 25 MG 24 hr tablet, Take 1 tablet (25 mg total) by mouth once daily., Disp: 90 tablet, Rfl: 3    ALLERGIES:  Review of patient's allergies indicates:  No Known Allergies    PSYCHIATRIC EXAM:  There were no vitals filed for this visit.  Appearance:  Well groomed, appearing healthy and of stated age  Behavior:  Cooperative, pleasant, no psychomotor agitation or retardation  Speech:  Normal rate, rhythm, prosody, and volume  Mood:  "Great"  Affect:  Congruent  Thought Process:  Linear, logical, goal directed  Thought Content:  Negative for suicidal ideation, homicidal ideation, delusions or hallucinations.  Associations:  Intact  Memory:  Grossly Intact  Level of Consciousness/Orientation:  Grossly intact  Fund of Knowledge:  Good  Attention:  Good  Language:  Fluent, able to name abstract and concrete objects  Insight:  Good  Judgment:  Intact  Psychomotor signs:  No involuntary movements or tremor of face  Gait:  Unable to assess via virtual visit      RELEVANT LABS/STUDIES:    Lab Results   Component Value Date    WBC 9.30 07/06/2020    HGB 13.9 07/06/2020    HCT 42.2 07/06/2020    MCV 88 07/06/2020     07/06/2020     BMP  Lab Results   Component Value Date     07/06/2020    K 4.6 07/06/2020     07/06/2020    CO2 28 07/06/2020    BUN 17 07/06/2020    CREATININE 0.70 07/06/2020    CALCIUM 9.3 07/06/2020    ANIONGAP 8 10/31/2019    ESTGFRAFRICA >60 07/06/2020    EGFRNONAA >60 07/06/2020     Lab Results   Component Value Date    ALT 59 (H) 07/06/2020    AST 45 (H) 07/06/2020    GGT 72 (H) 08/06/2019    ALKPHOS 136 07/06/2020    BILITOT 0.5 07/06/2020     Lab Results   Component Value Date    TSH 1.50 07/06/2020     Lab Results   Component Value Date    HGBA1C 6.1 (H) 08/16/2019 "       IMPRESSION:    Zo Martínez is a 40 y.o. female with history of Depression, Anxiety, Insomnia, and Tourette's who presents for follow up appointment.    Status/Progress:  Based on the examination today, the patient's problem(s) is/are improved.  New problems have not been presented today.     Risk Parameters:  Patient reports no suicidal ideation  Patient reports no homicidal ideation  Patient reports no self-injurious behavior  Patient reports no violent behavior    DIAGNOSES:    ICD-10-CM ICD-9-CM   1. SKYE (generalized anxiety disorder)  F41.1 300.02   2. Obsessive thinking  F42.8 300.3   3. Recurrent major depressive disorder, in partial remission  F33.41 296.35   4. Tourette's syndrome  F95.2 307.23     PLAN:  · Significant improvement in symptoms.  · Continue Lexapro 10mg daily.  · Discussed with patient informed consent, risks versus benefits, alternative treatments, side effect profile and the inherent unpredictability of individual responses to these treatments.  The patient expresses understanding of the above and displays the capacity to agree with this current plan.    RETURN TO CLINIC:  Follow up in about 3 months (around 11/26/2020).

## 2020-08-30 PROBLEM — F33.41 RECURRENT MAJOR DEPRESSIVE DISORDER, IN PARTIAL REMISSION: Status: ACTIVE | Noted: 2020-08-30

## 2020-08-30 PROBLEM — F33.0 MILD EPISODE OF RECURRENT MAJOR DEPRESSIVE DISORDER: Status: RESOLVED | Noted: 2017-04-18 | Resolved: 2020-08-30

## 2020-09-17 ENCOUNTER — PATIENT MESSAGE (OUTPATIENT)
Dept: HEPATOLOGY | Facility: CLINIC | Age: 40
End: 2020-09-17

## 2020-09-17 ENCOUNTER — PROCEDURE VISIT (OUTPATIENT)
Dept: HEPATOLOGY | Facility: CLINIC | Age: 40
End: 2020-09-17
Payer: COMMERCIAL

## 2020-09-17 ENCOUNTER — TELEPHONE (OUTPATIENT)
Dept: HEPATOLOGY | Facility: CLINIC | Age: 40
End: 2020-09-17

## 2020-09-17 ENCOUNTER — PATIENT OUTREACH (OUTPATIENT)
Dept: ADMINISTRATIVE | Facility: OTHER | Age: 40
End: 2020-09-17

## 2020-09-17 ENCOUNTER — OFFICE VISIT (OUTPATIENT)
Dept: HEPATOLOGY | Facility: CLINIC | Age: 40
End: 2020-09-17
Payer: COMMERCIAL

## 2020-09-17 VITALS
WEIGHT: 233.94 LBS | RESPIRATION RATE: 16 BRPM | DIASTOLIC BLOOD PRESSURE: 72 MMHG | BODY MASS INDEX: 39.94 KG/M2 | SYSTOLIC BLOOD PRESSURE: 139 MMHG | HEART RATE: 79 BPM | HEIGHT: 64 IN | OXYGEN SATURATION: 95 %

## 2020-09-17 DIAGNOSIS — K74.00 LIVER FIBROSIS: ICD-10-CM

## 2020-09-17 DIAGNOSIS — K76.0 FATTY LIVER: Primary | ICD-10-CM

## 2020-09-17 DIAGNOSIS — R74.8 ELEVATED LIVER ENZYMES: ICD-10-CM

## 2020-09-17 DIAGNOSIS — R74.01 ELEVATED TRANSAMINASE LEVEL: ICD-10-CM

## 2020-09-17 DIAGNOSIS — R73.03 PRE-DIABETES: ICD-10-CM

## 2020-09-17 DIAGNOSIS — K76.0 FATTY LIVER: ICD-10-CM

## 2020-09-17 DIAGNOSIS — E66.01 SEVERE OBESITY (BMI 35.0-35.9 WITH COMORBIDITY): ICD-10-CM

## 2020-09-17 PROCEDURE — 99214 PR OFFICE/OUTPT VISIT, EST, LEVL IV, 30-39 MIN: ICD-10-PCS | Mod: S$GLB,,, | Performed by: NURSE PRACTITIONER

## 2020-09-17 PROCEDURE — 99999 PR PBB SHADOW E&M-EST. PATIENT-LVL IV: CPT | Mod: PBBFAC,,, | Performed by: NURSE PRACTITIONER

## 2020-09-17 PROCEDURE — 3008F PR BODY MASS INDEX (BMI) DOCUMENTED: ICD-10-PCS | Mod: CPTII,S$GLB,, | Performed by: NURSE PRACTITIONER

## 2020-09-17 PROCEDURE — 3078F DIAST BP <80 MM HG: CPT | Mod: CPTII,S$GLB,, | Performed by: NURSE PRACTITIONER

## 2020-09-17 PROCEDURE — 91200 LIVER ELASTOGRAPHY: CPT | Mod: S$GLB,,, | Performed by: NURSE PRACTITIONER

## 2020-09-17 PROCEDURE — 99999 PR PBB SHADOW E&M-EST. PATIENT-LVL IV: ICD-10-PCS | Mod: PBBFAC,,, | Performed by: NURSE PRACTITIONER

## 2020-09-17 PROCEDURE — 3075F PR MOST RECENT SYSTOLIC BLOOD PRESS GE 130-139MM HG: ICD-10-PCS | Mod: CPTII,S$GLB,, | Performed by: NURSE PRACTITIONER

## 2020-09-17 PROCEDURE — 99499 UNLISTED E&M SERVICE: CPT | Mod: GT,S$GLB,, | Performed by: NURSE PRACTITIONER

## 2020-09-17 PROCEDURE — 3008F BODY MASS INDEX DOCD: CPT | Mod: CPTII,S$GLB,, | Performed by: NURSE PRACTITIONER

## 2020-09-17 PROCEDURE — 3078F PR MOST RECENT DIASTOLIC BLOOD PRESSURE < 80 MM HG: ICD-10-PCS | Mod: CPTII,S$GLB,, | Performed by: NURSE PRACTITIONER

## 2020-09-17 PROCEDURE — 99499 NO LOS: ICD-10-PCS | Mod: GT,S$GLB,, | Performed by: NURSE PRACTITIONER

## 2020-09-17 PROCEDURE — 91200 FIBROSCAN (VIBRATION CONTROLLED TRANSIENT ELASTOGRAPHY): ICD-10-PCS | Mod: S$GLB,,, | Performed by: NURSE PRACTITIONER

## 2020-09-17 PROCEDURE — 99214 OFFICE O/P EST MOD 30 MIN: CPT | Mod: S$GLB,,, | Performed by: NURSE PRACTITIONER

## 2020-09-17 PROCEDURE — 3075F SYST BP GE 130 - 139MM HG: CPT | Mod: CPTII,S$GLB,, | Performed by: NURSE PRACTITIONER

## 2020-09-17 NOTE — PATIENT INSTRUCTIONS
1. Fibroscan soon to look for fat or scar tissue in the liver, need to be fasting 3 hours for the scan  2. Recommend Hep A/B vaccine, see below   3. Your lab workup was negative for Marco's, alpha-1 antitrypsin deficiency, hemochromatosis (3 genetic liver diseases), autoimmune liver disease, and viral hepatitis B and C. Therefore, your elevated liver enzymes likely related to fatty liver  4.  Follow up in 1 year with fibroscan and labs before     There is no FDA approved therapy for fatty liver disease. Therefore, these things are important:  1. Limit alcohol consumption  2 Weight loss goal of 30 lbs, referral for [facility] if interested. Also, if interested in a dietician visit to create a weight loss plan, contact the dietician team at [facility] at [email or web address] to schedule a visit to you can call [facility] in [place]: [phone] and the  will transfer the call to one of the dieticians to schedule an appointment  3. Low carb/sugar, high fiber and protein diet.Try to limit your carb intake to LESS than 30-45 grams of carbs with a meal or LESS than 5-10 grams with any snack (total of any snack foods eaten during that time). Use [employer] Pal josh to add up your carbs through the day. Do NOT drink any beverages with calories or carbs (this can lead to high blood sugar and weight gain). Also, some of our patients have been very successful with weight loss using the pre made/planned meal planning services that limit calories and portion size (one example is Sensible Meals but there are many out there)  4. Exercise, 5 days per week, 30 minutes per day, as tolerated  5. Recommend good cholesterol, blood pressure, blood sugar levels     In some people, fatty liver can progress to steatohepatitis (inflamatory fatty liver) and possibly to cirrhosis, putting one at increased risk for liver cancer, liver failure, and death. Therefore, the lifestyle changes are very  important to decrease this risk.     Website with information about fatty liver and inflammation related to fatty liver (ALBERTO) = www.nashtruth.com  AND [email or web address].com

## 2020-09-17 NOTE — PROGRESS NOTES
Care Everywhere: updated  Immunization: no profile in links   Health Maintenance: updated  Media Review:   Legacy Review:   Order placed:   Upcoming appts:

## 2020-09-17 NOTE — TELEPHONE ENCOUNTER
Attempted to contact patient and schedule appointment but patient didn't answer. Left patient a voicemail stating the purpose for the call. Awaiting a call back.     Call:     Paola Haskins, NAIDA [facility] Staff             Fibroscan results sent to pt via [facility]. I recommend a f/u in 6 months with labs a few days before, fibroscan same day as visit. Please contact pt to schedule   Thanks

## 2020-09-17 NOTE — Clinical Note
Fibroscan results sent to pt via [facility]. I recommend a f/u in 6 months with labs a few days before, fibroscan same day as visit. Please contact pt to schedule  Thanks

## 2020-09-17 NOTE — PROCEDURES
FibroScan (Vibration Controlled Transient Elastography)    Date/Time: 9/17/2020 10:15 AM  Performed by: Paola Haskins NP  Authorized by: Paola Haskins NP     Diagnosis:  NAFLD    Probe:  M    Universal Protocol: Patient's identity, procedure and site were verified, confirmatory pause was performed.  Discussed procedure including risks and potential complications.  Questions answered.  Patient verbalizes understanding and wishes to proceed with VCTE.     Procedure: After providing explanations of the procedure, patient was placed in the supine position with right arm in maximum abduction to allow optimal exposure of right lateral abdomen.  Patient was briefly assessed, Testing was performed in the mid-axillary location, 50Hz Shear Wave pulses were applied and the resulting Shear Wave and Propagation Speed detected with a 3.5 MHz ultrasonic signal, using the FibroScan probe, Skin to liver capsule distance and liver parenchyma were accessed during the entire examination with the FibroScan probe, Patient was instructed to breathe normally and to abstain from sudden movements during the procedure, allowing for random measurements of liver stiffness. At least 10 Shear Waves were produced, Individual measurements of each Shear Wave were calculated.  Patient tolerated the procedure well with no complications.  Meets discharge criteria as was dismissed.  Rates pain 0 out of 10.  Patient will follow up with ordering provider to review results.      Findings  Median liver stiffness score:  10.2  CAP Reading: dB/m:  343    IQR/med %:  14  Interpretation  Fibrosis interpretation is based on medial liver stiffness - Kilopascal (kPa).    Fibrosis Stage:  F3  Steatosis interpretation is based on controlled attenuation parameter - (dB/m).    Steatosis Grade:  S3

## 2020-09-17 NOTE — PROGRESS NOTES
[facility] VISIT FOLLOW UP NOTE    PCP:  Guillermina Zuniga MD     CHIEF COMPLAINT: fatty liver, elevated liver enzymes    HPI: This is a 40 y.o. White female with PMH noted below, presenting for evaluation of fatty liver on US and elevated liver enzymes.     Serological workup was negative for Marco's, alpha-1 antitrypsin deficiency, hemochromatosis, autoimmune etiology, and viral hepatitis A, B and C.     Risk factors for NAFLD include obesity, HTN, pre-DM    Liver fibrosis staging:  -- FIbroscan to be done today     Interval HPI: Presents today alone. Weight gain since last visit. fibroscan to be done after visit, not currently fasting   Previously lost ~40 lbs on low carb but resumed poor eating ways and gained all weight back     Labs done 9/2020 showed elevated transaminase levels (ALT > AST, increasing elevation since 8/2019, previously WNL/mild elevation in 5331-8182 )  Platelets and alk phos WNL  Synthetic liver functioning WNL    Lab Results   Component Value Date    ALT 84 (H) 09/14/2020    AST 60 (H) 09/14/2020    ALKPHOS 125 09/14/2020    BILITOT 0.5 09/14/2020    ALBUMIN 4.3 09/14/2020    INR 0.9 12/13/2019     07/06/2020     Abd U/S done 8/30/19 showed hepatomegaly (20.6cm), fatty liver. No bile duct dilation. No splenomegaly    Denies family history of liver disease . Denies alcohol consumption currently or in the past    Immunity to Hep A and B - needs TwinRix, previously sent to [facility] pharm and ID    Denies jaundice, dark urine, abdominal distention, hematemesis, melena, slowed mentation. No abnormal skin rashes. No generalized joint or muscle pain.      Review of patient's allergies indicates:  No Known Allergies    Current Outpatient Medications on File Prior to Visit   Medication Sig Dispense Refill    ALPRAZolam (XANAX) 0.25 MG tablet Take 1 tablet (0.25 mg total) by mouth nightly as needed for Anxiety. 30 tablet 0    escitalopram oxalate (LEXAPRO) 10 MG tablet Take 1/2  tablet (5mg) daily x 7 days.  If tolerating, can then increase to 1 tablet (10mg) daily thereafter. 30 tablet 3    lancets (ACCU-CHEK FASTCLIX LANCET [facility] [place] [facility]) Accu-Chek FastClix      lancing device with lancets Kit Accu-Chek FastClix Lancing Device      metoprolol succinate (TOPROL-XL) 25 MG 24 hr tablet Take 1 tablet (25 mg total) by mouth once daily. 90 tablet 3     No current facility-administered medications on file prior to visit.        PMHX:  has a past medical history of Anxiety, Family history of breast cancer in mother (4/18/2017), Family history of colon cancer: father age 55 (4/18/2017), Fatty liver (9/6/2019), Gestational diabetes mellitus, Obesity, Class I, BMI 30-34.9, Pre-diabetes (4/18/2017), Severe obesity (BMI 35.0-35.9 with comorbidity) (9/19/2017), Tourette's syndrome, and Transient elevated blood pressure.    PSHX:  has a past surgical history that includes Cholecystectomy and Dilation and curettage of uterus.    FAMILY HISTORY: Negative for liver disease, reviewed in [facility]    SOCIAL HISTORY:   Social History     Tobacco Use   Smoking Status Never Smoker   Smokeless Tobacco Never Used       Social History     Substance and Sexual Activity   Alcohol Use Yes    Frequency: Never    Drinks per session: Patient refused    Binge frequency: Never    Comment: rarely, socially, few times a year max        Social History     Substance and Sexual Activity   Drug Use No       ROS:   GENERAL: Denies fever, chills, +weight gain, denies fatigue  HEENT: Denies headaches, dizziness, vision/hearing changes  CARDIOVASCULAR: Denies chest pain, palpitations, or edema  RESPIRATORY: Denies dyspnea, cough  GI: Denies abdominal pain, rectal bleeding, nausea, vomiting. No change in bowel pattern or color  : Denies dysuria, hematuria   SKIN: Denies rash, itching   NEURO: Denies confusion, memory loss, or mood changes  PSYCH: Denies depression or anxiety  HEME/LYMPH: Denies easy bruising or bleeding    PHYSICAL  "EXAM:   Friendly White female, in no acute distress; alert and oriented to person, place and time  VITALS: /72 (BP Location: Right arm, Patient Position: Sitting, BP Method: Medium (Automatic))   Pulse 79   Resp 16   Ht 5' 4" (1.626 m)   Wt 106.1 kg (233 lb 14.5 oz)   SpO2 95%   BMI 40.15 kg/m²   HENT: Normocephalic, without obvious abnormality. Oral mucosa pink and moist. Dentition good.  EYES: Sclerae anicteric. No conjunctival pallor.   NECK: Supple. No masses or cervical adenopathy.  CARDIOVASCULAR: Regular rate and rhythm. No murmurs.  RESPIRATORY: Normal respiratory effort. BBS CTA. No wheezes or crackles.  GI: Soft, non-tender, non-distended. +  hepatomegaly. No masses palpable. No ascites.  EXTREMITIES:  No clubbing, cyanosis or edema.  SKIN: Warm and dry. No jaundice. No rashes noted to exposed skin. No telangectasias noted. No palmar erythema.  NEURO:  Normal gait. No asterixis.  PSYCH:  Memory intact. Thought and speech pattern appropriate. Behavior normal. No depression or anxiety noted.    DIAGNOSTIC STUDIES:    ABD. U/S-    Done 8/30/19  FINDINGS:  The liver is enlarged, measuring 20.6 cm and demonstrates diffuse increased parenchymal echogenicity and parenchymal sound attenuation with an elevated HRI of 1.95, consistent with fatty infiltration.  There is no intra or extrahepatic bile duct dilatation.  The common duct measures 4 mm.    The gallbladder is surgically absent.    The visualized portions of the pancreas are unremarkable.    The spleen measures 10.1 x 3.6 cm and is unremarkable.    There is no free fluid within the visualized abdomen.      Impression       Hepatomegaly and hepatic steatosis.       LIVER BIOPSY-   None     FIBROSCAN - to be done today     EDUCATION:    We discussed the manifestations of non-alcoholic fatty liver disease. At this time, there are no FDA approved therapy for non-alcoholic fatty liver disease.  The patient and I discussed the importance of diet, " exercise, and weight loss for management of NAFLD. We discussed a low fat, low carb/sugar, high fiber diet and a goal of 30 minutes of exercise 5 times per week    Discussed that fatty liver can progress to steatohepatitis and possibly to cirrhosis, putting one at increased risk for liver cancer, liver failure, and death    Recommend to avoid alcohol consumption. It is know that heavy alcohol use is associated with disease progression among patients with fatty liver disease. Information is unknown at this time of the effects on the liver with alcohol use in moderation.      ASSESSMENT & PLAN  40 y.o. White female with:  1. Elevated liver enzymes, likely due to fatty liver   -- ALT>AST, duration of liver enzyme elevation: at least since early August, previously normal in 2017  --- synthetic liver function WNL  --- Abd US done 8/2019 showed hepatomegaly and fatty liver. No splenomegaly  -- do not suspect any medications contributing  --- previous serological work up : negative for Marco's, alpha-1 antitrypsin deficiency, hemochromatosis, autoimmune etiology, and viral hepatitis A. B and C  --- Hep A and B immunity: needs TwinRix, [facility]    2. Fatty liver, likely related to metabolic risk factors   - risk factors for fatty liver disease include obesity, HTN, pre-DM  -- Fibroscan today  -- Recommendations discussed with patient:  1. Limit alcohol consumption  2 Weight loss goal of 30 lbs  3. Low carb/sugar, high fiber and protein diet  4. Exercise, 5 days per week, 30 minutes per day, as tolerated  5. Recommend good cholesterol, blood pressure, blood sugar levels     3. Obesity, HTN, pre-DM   -- Body mass index is 40.15 kg/m².   -- increases risk for fatty liver        ADDENDUM: Fibroscan results received.   Fibroscan today = kPA = 10.2 = F3 fibrosis.  CAP score = 343 = S3 = >67% steatosis    Discussed fibroscan results, including F3 fibrosis.  Discussed need for significant weight loss and  dietary changes and will repeat fibroscan in 6 months       Follow up in about 6 months (around 3/17/2021). with labs a few days before, fibroscan same day     Thank you for allowing me to participate in the care of Zo Haskins NP-C    CC'ed note to:   Guillermina Zuniga MD

## 2020-10-05 ENCOUNTER — PATIENT MESSAGE (OUTPATIENT)
Dept: ADMINISTRATIVE | Facility: HOSPITAL | Age: 40
End: 2020-10-05

## 2020-10-13 ENCOUNTER — PATIENT MESSAGE (OUTPATIENT)
Dept: ELECTROPHYSIOLOGY | Facility: CLINIC | Age: 40
End: 2020-10-13

## 2020-10-14 ENCOUNTER — PATIENT MESSAGE (OUTPATIENT)
Dept: PSYCHIATRY | Facility: CLINIC | Age: 40
End: 2020-10-14

## 2020-10-14 NOTE — TELEPHONE ENCOUNTER
Dr Martínez,   Please advise. This is what I found:  Concurrent use of ESCITALOPRAM and QT INTERVAL-PROLONGING DRUGS may result in increased risk of QT-interval prolongation

## 2020-10-16 ENCOUNTER — PATIENT MESSAGE (OUTPATIENT)
Dept: INTERNAL MEDICINE | Facility: CLINIC | Age: 40
End: 2020-10-16

## 2020-10-16 NOTE — TELEPHONE ENCOUNTER
Okay to schedule follow-up with Essence or me sometime in the next 1-2 weeks, diverticulitis, thank you

## 2020-10-19 ENCOUNTER — OFFICE VISIT (OUTPATIENT)
Dept: INTERNAL MEDICINE | Facility: CLINIC | Age: 40
End: 2020-10-19
Payer: COMMERCIAL

## 2020-10-19 VITALS
HEIGHT: 64 IN | SYSTOLIC BLOOD PRESSURE: 125 MMHG | WEIGHT: 224 LBS | BODY MASS INDEX: 38.24 KG/M2 | DIASTOLIC BLOOD PRESSURE: 80 MMHG

## 2020-10-19 DIAGNOSIS — I10 ESSENTIAL HYPERTENSION: Chronic | ICD-10-CM

## 2020-10-19 DIAGNOSIS — K57.92 DIVERTICULITIS: Primary | ICD-10-CM

## 2020-10-19 DIAGNOSIS — D21.9 FIBROID: ICD-10-CM

## 2020-10-19 DIAGNOSIS — Z12.31 SCREENING MAMMOGRAM, ENCOUNTER FOR: ICD-10-CM

## 2020-10-19 DIAGNOSIS — E66.01 SEVERE OBESITY (BMI 35.0-35.9 WITH COMORBIDITY): ICD-10-CM

## 2020-10-19 DIAGNOSIS — K74.00 LIVER FIBROSIS: ICD-10-CM

## 2020-10-19 DIAGNOSIS — K76.0 FATTY LIVER: ICD-10-CM

## 2020-10-19 DIAGNOSIS — R74.8 ELEVATED LIVER ENZYMES: ICD-10-CM

## 2020-10-19 PROCEDURE — 3079F PR MOST RECENT DIASTOLIC BLOOD PRESSURE 80-89 MM HG: ICD-10-PCS | Mod: CPTII,S$GLB,, | Performed by: INTERNAL MEDICINE

## 2020-10-19 PROCEDURE — 3079F DIAST BP 80-89 MM HG: CPT | Mod: CPTII,S$GLB,, | Performed by: INTERNAL MEDICINE

## 2020-10-19 PROCEDURE — 3008F PR BODY MASS INDEX (BMI) DOCUMENTED: ICD-10-PCS | Mod: CPTII,S$GLB,, | Performed by: INTERNAL MEDICINE

## 2020-10-19 PROCEDURE — 3074F PR MOST RECENT SYSTOLIC BLOOD PRESSURE < 130 MM HG: ICD-10-PCS | Mod: CPTII,S$GLB,, | Performed by: INTERNAL MEDICINE

## 2020-10-19 PROCEDURE — 99215 OFFICE O/P EST HI 40 MIN: CPT | Mod: S$GLB,,, | Performed by: INTERNAL MEDICINE

## 2020-10-19 PROCEDURE — 99999 PR PBB SHADOW E&M-EST. PATIENT-LVL IV: ICD-10-PCS | Mod: PBBFAC,,, | Performed by: INTERNAL MEDICINE

## 2020-10-19 PROCEDURE — 99999 PR PBB SHADOW E&M-EST. PATIENT-LVL IV: CPT | Mod: PBBFAC,,, | Performed by: INTERNAL MEDICINE

## 2020-10-19 PROCEDURE — 3008F BODY MASS INDEX DOCD: CPT | Mod: CPTII,S$GLB,, | Performed by: INTERNAL MEDICINE

## 2020-10-19 PROCEDURE — 3074F SYST BP LT 130 MM HG: CPT | Mod: CPTII,S$GLB,, | Performed by: INTERNAL MEDICINE

## 2020-10-19 PROCEDURE — 99215 PR OFFICE/OUTPT VISIT, EST, LEVL V, 40-54 MIN: ICD-10-PCS | Mod: S$GLB,,, | Performed by: INTERNAL MEDICINE

## 2020-10-19 NOTE — PROGRESS NOTES
Transitional Care Note  Subjective:       Patient ID: Zo Martínez is a 40 y.o. female.  Chief Complaint: Hospital Follow Up    Family and/or Caretaker present at visit?  No.  Diagnostic tests reviewed/disposition: I have reviewed all completed as well as pending diagnostic tests at the time of discharge.  Disease/illness education: yes  Home health/community services discussion/referrals: Patient does not have home health established from hospital visit.  They do not need home health.  If needed, we will set up home health for the patient.   Establishment or re-establishment of referral orders for community resources: No other necessary community resources.   Discussion with other health care providers: No discussion with other health care providers necessary.     Seen in the emergency room with diverticulitis.  Appears to be uncomplicated.  Doing well on antibiotics, tolerating well.  No fever, chills or sweats.  Pain is much improved.  Only risk seems to be that she was eating a higher protein, lower carbohydrate planned due to her fatty liver.    Working on weight loss.  Has been seen in hepatology, has fibrosis and will be followed every 6 months for her fatty liver.    Patient Active Problem List   Diagnosis    Gestational diabetes mellitus    B12 deficiency    Tourette's syndrome    Mild vitamin D deficiency    Family history of colon cancer: father age 55    Family history of breast cancer in mother    Pre-diabetes    Premature atrial contractions    Essential hypertension    Severe obesity (BMI 35.0-35.9 with comorbidity)    Nonsustained ventricular tachycardia    Fatty liver: F3 S3 9/20 needs Hepatology follow up every 6 months    HANG (obstructive sleep apnea): mild per sleep study 10/19    SKYE (generalized anxiety disorder)    Obsessive thinking    Recurrent major depressive disorder, in partial remission    Liver fibrosis    Elevated liver enzymes     HPI  Review of Systems    Constitutional: Negative for chills, fatigue and fever.   HENT: Negative for congestion and postnasal drip.    Eyes: Negative.    Respiratory: Negative for cough, chest tightness, shortness of breath and wheezing.    Cardiovascular: Negative.    Gastrointestinal: Negative.  Negative for blood in stool, nausea and vomiting.        See above   Genitourinary: Negative for dysuria and hematuria.   Psychiatric/Behavioral:        Stable on regimen       Objective:      Physical Exam  Constitutional:       Appearance: She is well-developed.   HENT:      Head: Normocephalic and atraumatic.      Right Ear: External ear normal.      Left Ear: External ear normal.   Eyes:      Extraocular Movements: Extraocular movements intact.      Conjunctiva/sclera: Conjunctivae normal.   Neck:      Musculoskeletal: Normal range of motion and neck supple.      Thyroid: No thyromegaly.   Cardiovascular:      Rate and Rhythm: Normal rate and regular rhythm.      Heart sounds: Normal heart sounds.   Pulmonary:      Effort: No respiratory distress.      Breath sounds: No wheezing or rales.   Abdominal:      General: Bowel sounds are normal.      Palpations: Abdomen is soft. There is no mass.      Tenderness: There is no rebound.      Comments: No rebound or guarding, no masses   Lymphadenopathy:      Cervical: No cervical adenopathy.   Skin:     General: Skin is warm and dry.      Findings: No erythema or rash.   Neurological:      Mental Status: She is alert and oriented to person, place, and time.   Psychiatric:         Mood and Affect: Mood normal.         Behavior: Behavior normal.         Thought Content: Thought content normal.         Judgment: Judgment normal.         Assessment:       1. Diverticulitis    2. Severe obesity (BMI 35.0-35.9 with comorbidity)    3. Essential hypertension    4. Screening mammogram, encounter for    5. Fibroid    6. Liver fibrosis    7. Fatty liver: F3 S3 9/20 needs Hepatology follow up every 6 months     8. Elevated liver enzymes        Plan:         Zo was seen today for hospital follow up.    Diagnoses and all orders for this visit:    Diverticulitis; much improved.  Continue treatment.  Given mild this of disease on CT scan, I do not think a follow-up scan is necessary unless symptoms do not completely resolve.  Diet and lifestyle issues reviewed at length, all questions answers, handouts provided    Severe obesity (BMI 35.0-35.9 with comorbidity):  Continue to work on portion control, exercise as tolerated and weight loss    Essential hypertension; Low salt diet, exercise, 15-20-# weight loss in next 6-12 months' time.  Call if BP > 130/80 on a regular basis.    Screening mammogram, encounter for  -     Mammo Digital Screening Bilat w/ Bobby; Standing    Fibroid  -     Ambulatory referral/consult to Gynecology; Future    Liver fibrosis; continue to work on lifestyle modification, diet and weight loss    Fatty liver: F3 S3 9/20 needs Hepatology follow up every 6 months:  She is aware    Elevated liver enzymes:  Slightly improved; will continue to monitor closely    Pap smear, she will schedule  Flu shot recommended, she declines  Pneumonia vaccine recommended, she declines  She will schedule mammogram  Follow-up in terms of symptoms this week, sooner with problems prior  Anticipate follow-up be within the next 3-6 months, sooner with problems in the interim

## 2020-10-19 NOTE — PATIENT INSTRUCTIONS
Understanding Diverticulosis and Diverticulitis     Pouches or diverticula usually occur in the lower part of the colon called the sigmoid.     The colon (large intestine) is the last part of the digestive tract. It absorbs water from stool and changes it from a liquid to a solid. In certain cases, small pouches called diverticula can form in the colon wall. This condition is called diverticulosis. The pouches can become infected. If this happens, it becomes a more serious problem called diverticulitis. These problems can be painful. But they can be managed.  Managing your condition  Diet changes or medicines may be prescribed.   If you have diverticulosis  Recommendations include:  · Diet changes are often enough to control symptoms. The main changes are adding fiber (roughage) and drinking more water. Fiber absorbs water as it travels through your colon. This helps your stool stay soft and move smoothly. Water helps this process.  · If needed, you may be told to take over-the-counter stool softeners.  · To help relieve pain, antispasmodic medicines may be prescribed.  · Watch for changes in your bowel movements. Tell the healthcare provider if you notice any changes.  · Begin an exercise program. Ask your healthcare provider how to get started.  · Get plenty of rest and sleep.   If you have diverticulitis  Treatment depends on how bad your symptoms are.  · For mild symptoms. You may be put on a liquid diet for a short time. Antibiotics are usually prescribed. If these two steps relieve your symptoms, you may then be prescribed a high-fiber diet. If you still have symptoms, your healthcare provider will discuss more treatment choices with you.  · For severe symptoms. You may need to be admitted to the hospital. There, you can be given IV antibiotics and fluids. You will also be put on a low-fiber or liquid diet. Although not common, surgery is needed in some people with severe symptoms.  [place] to colon health      Diverticulitis occurs when the pouches become infected or inflamed.     Help keep your colon healthy with a diet that includes plenty of high-fiber fruits, vegetables, and whole grains. Drink plenty of liquids like water and juice. Maintain a healthy lifestyle including regular exercise, stress management, and adequate rest and sleep.   Date Last Reviewed: 7/1/2016  © 0318-4532 [employer]. [address] [place] [address], [place] [address]. All rights reserved. This information is not intended as a substitute for professional medical care. Always follow your healthcare professional's instructions.

## 2020-10-26 ENCOUNTER — PATIENT MESSAGE (OUTPATIENT)
Dept: HEPATOLOGY | Facility: CLINIC | Age: 40
End: 2020-10-26

## 2020-11-30 ENCOUNTER — OFFICE VISIT (OUTPATIENT)
Dept: PSYCHIATRY | Facility: CLINIC | Age: 40
End: 2020-11-30
Payer: COMMERCIAL

## 2020-11-30 DIAGNOSIS — F95.2 TOURETTE'S SYNDROME: ICD-10-CM

## 2020-11-30 DIAGNOSIS — F41.1 GAD (GENERALIZED ANXIETY DISORDER): Primary | ICD-10-CM

## 2020-11-30 DIAGNOSIS — F33.42 RECURRENT MAJOR DEPRESSIVE DISORDER, IN FULL REMISSION: ICD-10-CM

## 2020-11-30 PROCEDURE — 99213 OFFICE O/P EST LOW 20 MIN: CPT | Mod: 95,,, | Performed by: INTERNAL MEDICINE

## 2020-11-30 PROCEDURE — 99213 PR OFFICE/OUTPT VISIT, EST, LEVL III, 20-29 MIN: ICD-10-PCS | Mod: 95,,, | Performed by: INTERNAL MEDICINE

## 2020-11-30 NOTE — PROGRESS NOTES
OUTPATIENT PSYCHIATRY RETURN VISIT    ENCOUNTER DATE:  11/30/2020  SITE:  [facility], [address]  LENGTH OF SESSION:  8 minutes    The patient location is:  Home  The chief complaint leading to consultation is:  Anxiety follow up    Visit type:  audiovisual    Face to Face time with patient:  8 minutes  12 minutes of total time spent on the encounter, which includes face to face time and non-face to face time preparing to see the patient (eg, review of tests), Obtaining and/or reviewing separately obtained history, Documenting clinical information in the electronic or other health record, Independently interpreting results (not separately reported) and communicating results to the patient/family/caregiver, or Care coordination (not separately reported).     Each patient to whom he or she provides medical services by telemedicine is:  (1) informed of the relationship between the physician and patient and the respective role of any other health care provider with respect to management of the patient; and (2) notified that he or she may decline to receive medical services by telemedicine and may withdraw from such care at any time.    CHIEF COMPLAINT:  Depression and Anxiety      HISTORY OF PRESENTING ILLNESS:  Zo Martínez is a 40 y.o. female with history of Depression, Anxiety, Insomnia, and Tourette's who presents for follow up appointment.     Plan at last appointment on 8/26/2020:  · Significant improvement in symptoms.  · Continue Lexapro 10mg daily.  · Discussed with patient informed consent, risks versus benefits, alternative treatments, side effect profile and the inherent unpredictability of individual responses to these treatments.  The patient expresses understanding of the above and displays the capacity to agree with this current plan.    History as told by patient:  Says she is doing very well.  Came off of Lexapro for a week when on antibiotic.  [place] more anxious so says she knows it is  working.  Anxiety is very well controlled and she is not having side effects.  Tourette's has not been an issue.  Denies symptoms of depression.    Medication side effects:  No  Medication compliance:  Yes    PSYCHIATRIC REVIEW OF SYSTEMS:  Trouble with sleep:  Denies  Appetite changes:  Denies  Weight changes:  Denies  Lack of energy:  Denies  Anhedonia:  Denies  Somatic symptoms:  Denies  Libido:  Decreased  Anxiety/panic:  Denies  Guilty/hopeless:  Denies  Self-injurious behavior/risky behavior:  Denies  Any drugs:  Denies  Alcohol:  Denies    MEDICAL REVIEW OF SYSTEMS:  Complete review of systems performed covering Constitutional, Musculoskeletal, Neurologic.  All systems negative except for that covered in HPI.    PAST PSYCHIATRIC, MEDICAL, AND SOCIAL HISTORY REVIEWED  The patient's past medical, family and social history have been reviewed and updated as appropriate within the electronic medical record - see encounter notes.    MEDICATIONS:    Current Outpatient Medications:     ALPRAZolam (XANAX) 0.25 MG tablet, Take 1 tablet (0.25 mg total) by mouth nightly as needed for Anxiety., Disp: 30 tablet, Rfl: 0    escitalopram oxalate (LEXAPRO) 10 MG tablet, Take 1/2 tablet (5mg) daily x 7 days.  If tolerating, can then increase to 1 tablet (10mg) daily thereafter. (Patient taking differently: Take 10 mg by mouth once daily. Take 1/2 tablet (5mg) daily x 7 days.  If tolerating, can then increase to 1 tablet (10mg) daily thereafter.), Disp: 30 tablet, Rfl: 3    metoprolol succinate (TOPROL-XL) 25 MG 24 hr tablet, Take 1 tablet (25 mg total) by mouth once daily., Disp: 90 tablet, Rfl: 3    ALLERGIES:  Review of patient's allergies indicates:  No Known Allergies    PSYCHIATRIC EXAM:  There were no vitals filed for this visit.  Appearance:  Well groomed, appearing healthy and of stated age  Behavior:  Cooperative, pleasant, no psychomotor agitation or retardation  Speech:  Normal rate, rhythm, prosody, and  "volume  Mood:  "Really good"  Affect:  Congruent  Thought Process:  Linear, logical, goal directed  Thought Content:  Negative for suicidal ideation, homicidal ideation, delusions or hallucinations.  Associations:  Intact  Memory:  Grossly Intact  Level of Consciousness/Orientation:  Grossly intact  Fund of Knowledge:  Good  Attention:  Good  Language:  Fluent, able to name abstract and concrete objects  Insight:  Good  Judgment:  Intact  Psychomotor signs:  No abnormal movements of face  Gait:  Unable to assess via virtual visit      RELEVANT LABS/STUDIES:    Lab Results   Component Value Date    WBC 10.00 10/12/2020    HGB 14.1 10/12/2020    HCT 41.8 10/12/2020    MCV 87 10/12/2020     10/12/2020     BMP  Lab Results   Component Value Date     10/12/2020    K 3.9 10/12/2020     10/12/2020    CO2 25 10/12/2020    BUN 15 10/12/2020    CREATININE 0.80 10/12/2020    CALCIUM 9.5 10/12/2020    ANIONGAP 8 10/31/2019    ESTGFRAFRICA >60 10/12/2020    EGFRNONAA >60 10/12/2020     Lab Results   Component Value Date    ALT 58 (H) 10/12/2020    AST 44 (H) 10/12/2020    GGT 72 (H) 08/06/2019    ALKPHOS 111 10/12/2020    BILITOT 0.6 10/12/2020     Lab Results   Component Value Date    TSH 1.50 07/06/2020     Lab Results   Component Value Date    HGBA1C 6.1 (H) 08/16/2019       IMPRESSION:    Zo Martínez is a 40 y.o. female with history of Depression, Anxiety, Insomnia, and Tourette's who presents for follow up appointment.    Status/Progress:  Based on the examination today, the patient's problem(s) is/are improved.  New problems have not been presented today.     Risk Parameters:  Patient reports no suicidal ideation  Patient reports no homicidal ideation  Patient reports no self-injurious behavior  Patient reports no violent behavior    DIAGNOSES:    ICD-10-CM ICD-9-CM   1. SKYE (generalized anxiety disorder)  F41.1 300.02   2. Recurrent major depressive disorder, in full remission  F33.42 296.36   3. " Tourette's syndrome  F95.2 307.23       PLAN:  · Symptoms well controlled.  · Continue Lexapro 10mg daily.  · Discussed with patient informed consent, risks versus benefits, alternative treatments, side effect profile and the inherent unpredictability of individual responses to these treatments.  The patient expresses understanding of the above and displays the capacity to agree with this current plan.    RETURN TO CLINIC:  Follow up in about 6 months (around 5/30/2021).

## 2020-12-10 DIAGNOSIS — I49.8 OTHER SPECIFIED CARDIAC ARRHYTHMIAS: Primary | ICD-10-CM

## 2020-12-15 ENCOUNTER — PATIENT OUTREACH (OUTPATIENT)
Dept: ADMINISTRATIVE | Facility: OTHER | Age: 40
End: 2020-12-15

## 2020-12-16 NOTE — PROGRESS NOTES
Requested updates within Care Everywhere.  Patient's chart was reviewed for overdue LILIANA topics.

## 2020-12-17 ENCOUNTER — HOSPITAL ENCOUNTER (OUTPATIENT)
Dept: CARDIOLOGY | Facility: CLINIC | Age: 40
Discharge: HOME | End: 2020-12-17
Payer: COMMERCIAL

## 2020-12-17 ENCOUNTER — OFFICE VISIT (OUTPATIENT)
Dept: ELECTROPHYSIOLOGY | Facility: CLINIC | Age: 40
End: 2020-12-17
Payer: COMMERCIAL

## 2020-12-17 VITALS
HEIGHT: 64 IN | DIASTOLIC BLOOD PRESSURE: 60 MMHG | WEIGHT: 221.31 LBS | BODY MASS INDEX: 37.78 KG/M2 | SYSTOLIC BLOOD PRESSURE: 110 MMHG | HEART RATE: 51 BPM

## 2020-12-17 DIAGNOSIS — I49.1 PREMATURE ATRIAL CONTRACTIONS: Primary | Chronic | ICD-10-CM

## 2020-12-17 DIAGNOSIS — I10 ESSENTIAL HYPERTENSION: Chronic | ICD-10-CM

## 2020-12-17 DIAGNOSIS — I47.29 NONSUSTAINED VENTRICULAR TACHYCARDIA: Chronic | ICD-10-CM

## 2020-12-17 DIAGNOSIS — I49.8 OTHER SPECIFIED CARDIAC ARRHYTHMIAS: ICD-10-CM

## 2020-12-17 DIAGNOSIS — R00.2 PALPITATIONS: ICD-10-CM

## 2020-12-17 DIAGNOSIS — G47.33 OSA (OBSTRUCTIVE SLEEP APNEA): ICD-10-CM

## 2020-12-17 PROCEDURE — 1126F AMNT PAIN NOTED NONE PRSNT: CPT | Mod: S$GLB,,, | Performed by: INTERNAL MEDICINE

## 2020-12-17 PROCEDURE — 3008F BODY MASS INDEX DOCD: CPT | Mod: CPTII,S$GLB,, | Performed by: INTERNAL MEDICINE

## 2020-12-17 PROCEDURE — 99213 OFFICE O/P EST LOW 20 MIN: CPT | Mod: S$GLB,,, | Performed by: INTERNAL MEDICINE

## 2020-12-17 PROCEDURE — 93005 ELECTROCARDIOGRAM TRACING: CPT | Mod: S$GLB,,, | Performed by: INTERNAL MEDICINE

## 2020-12-17 PROCEDURE — 1126F PR PAIN SEVERITY QUANTIFIED, NO PAIN PRESENT: ICD-10-PCS | Mod: S$GLB,,, | Performed by: INTERNAL MEDICINE

## 2020-12-17 PROCEDURE — 93010 ELECTROCARDIOGRAM REPORT: CPT | Mod: S$GLB,,, | Performed by: INTERNAL MEDICINE

## 2020-12-17 PROCEDURE — 3074F PR MOST RECENT SYSTOLIC BLOOD PRESSURE < 130 MM HG: ICD-10-PCS | Mod: CPTII,S$GLB,, | Performed by: INTERNAL MEDICINE

## 2020-12-17 PROCEDURE — 93010 RHYTHM STRIP: ICD-10-PCS | Mod: S$GLB,,, | Performed by: INTERNAL MEDICINE

## 2020-12-17 PROCEDURE — 99213 PR OFFICE/OUTPT VISIT, EST, LEVL III, 20-29 MIN: ICD-10-PCS | Mod: S$GLB,,, | Performed by: INTERNAL MEDICINE

## 2020-12-17 PROCEDURE — 99999 PR PBB SHADOW E&M-EST. PATIENT-LVL III: ICD-10-PCS | Mod: PBBFAC,,, | Performed by: INTERNAL MEDICINE

## 2020-12-17 PROCEDURE — 3078F DIAST BP <80 MM HG: CPT | Mod: CPTII,S$GLB,, | Performed by: INTERNAL MEDICINE

## 2020-12-17 PROCEDURE — 93005 RHYTHM STRIP: ICD-10-PCS | Mod: S$GLB,,, | Performed by: INTERNAL MEDICINE

## 2020-12-17 PROCEDURE — 99999 PR PBB SHADOW E&M-EST. PATIENT-LVL III: CPT | Mod: PBBFAC,,, | Performed by: INTERNAL MEDICINE

## 2020-12-17 PROCEDURE — 3074F SYST BP LT 130 MM HG: CPT | Mod: CPTII,S$GLB,, | Performed by: INTERNAL MEDICINE

## 2020-12-17 PROCEDURE — 3078F PR MOST RECENT DIASTOLIC BLOOD PRESSURE < 80 MM HG: ICD-10-PCS | Mod: CPTII,S$GLB,, | Performed by: INTERNAL MEDICINE

## 2020-12-17 PROCEDURE — 3008F PR BODY MASS INDEX (BMI) DOCUMENTED: ICD-10-PCS | Mod: CPTII,S$GLB,, | Performed by: INTERNAL MEDICINE

## 2020-12-17 RX ORDER — METOPROLOL SUCCINATE 25 MG/1
25 TABLET, EXTENDED RELEASE ORAL DAILY
Qty: 90 TABLET | Refills: 3 | Status: SHIPPED | OUTPATIENT
Start: 2020-12-17 | End: 2021-02-09 | Stop reason: SDUPTHER

## 2020-12-17 NOTE — PROGRESS NOTES
"Subjective:    Patient ID:  Zo Martínez is a 40 y.o. female who presents for follow-up of Palpitations    Referring Physician: Guillermina Zuniga MD    HPI  Prior Hx:  I had the pleasure of seeing Mrs. Martínez today in our electrophysiology clinic in consultation for her palpitations and chest pain.  As you are aware she is a pleasant 40 year-old woman with long-history of palpitations, hypertension, depression, and obesity.  She has noted for years to have episodes of skipped beats that occur without an identifiable trigger.  Some days are better than other days.  They are associated with a "sinking" or "dropping" sensation of her heart in her chest.  She has cut out the caffeine from her diet which has not effected her symptoms.  She also has occasional episodes of more sustained palpitations that last for a bit but <30 seconds.  Then recently she had an episode of squeezing/pushing chest pressure in the center of her chest while driving with associated shortness of breath that lasted for ~30 seconds.  A 48 hour holter monitor was performed.  Day 1 revealed 1 PAC.  Day 2 revealed 1108 PACs.  She noted that neither day was truly representative of her typical symptom burden.  Moreover she did not have any of the more "sustained" episodes while wearing the monitor.  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 She reports having early blood pressure in her family but does not believe anyone had a heart attack at a young age. She is a lifelong non-smoker. She is not physically active however wants to start exercising and lose weight.    Mrs. Martínez elected to wear a 14 day ZIO patch which noted rare PACS/PVCS, 1 6 beat run of AT, and 1 4 beat run of WCT, likely non-sustained VT.  There were however 49 patient triggered events with the majority correlating to sinus rhythm. We rx her atenolol as metoprolol had an interaction with bupropion. She had a treadmill exercise stress echo that " showed a structurally normal heart without any ischemic changes. She presents for follow-up. She reports feeling much better on atenolol and has very few palpitations. She reports she and her  do not prevent pregnancy.    Interim Hx:  Mrs. Martínez presents for 1 year follow. She has felt well. Had an episode of diverticulitis a few months ago. Palpitations are well controlled on low-dose metoprolol.      My interpretation of today's in clinic electrocardiogram is normal sinus rhythm with a rate of 51 bpm.    Review of Systems   Constitution: Negative for malaise/fatigue.   HENT: Negative for congestion and sore throat.    Eyes: Negative for blurred vision and visual disturbance.   Cardiovascular: Positive for palpitations (better with metoprolol). Negative for chest pain, dyspnea on exertion, irregular heartbeat, leg swelling, near-syncope, orthopnea, paroxysmal nocturnal dyspnea and syncope.   Respiratory: Negative for cough and shortness of breath.    Hematologic/Lymphatic: Negative for bleeding problem. Does not bruise/bleed easily.   Skin: Negative.    Musculoskeletal: Negative.    Gastrointestinal: Negative for bloating and abdominal pain.   Neurological: Negative for dizziness, light-headedness and weakness.        Objective:    Physical Exam   Constitutional: She is oriented to person, place, and time. She appears well-developed and well-nourished. No distress.   HENT:   Head: Normocephalic and atraumatic.   Eyes: Conjunctivae are normal. Right eye exhibits no discharge. Left eye exhibits no discharge.   Neck: Neck supple. No JVD present.   Cardiovascular: Normal rate, regular rhythm and normal heart sounds. Exam reveals no gallop and no friction rub.   No murmur heard.  Pulmonary/Chest: Effort normal and breath sounds normal. No respiratory distress. She has no wheezes. She has no rales.   Abdominal: Soft. Bowel sounds are normal. She exhibits no distension. There is no abdominal tenderness. There is  no rebound.   Musculoskeletal:         General: No edema.   Neurological: She is alert and oriented to person, place, and time.   Skin: Skin is warm and dry. She is not diaphoretic.   Psychiatric: She has a normal mood and affect. Her behavior is normal. Judgment and thought content normal.   Vitals reviewed.        Assessment:       1. Premature atrial contractions    2. Nonsustained ventricular tachycardia    3. Essential hypertension    4. Palpitations    5. HANG (obstructive sleep apnea): mild per sleep study 10/19         Plan:         In summary, Mrs. Martínez is a pleasant 40 year-old woman with long-history of palpitations, depression, HANG, ALBERTO, and obesity presenting for palpitations and chest discomfort.  Most of her symptoms correlate to sinus rhythm on her monitor.  However some may be from PACs/PVCs. She had 1 4 beat run of likely NSVT on her monitor. Her exercise stress echo was unremarkable for ischemia. Symptoms controlled on low-dose metoprolol. Continue same. RTC in 1 year, sooner if needed.    Thank you for allowing me to participate in the care of this patient. Please do not hesitate to call me with any questions or concerns.    Chance Martínez MD, PhD  Cardiac Electrophysiology

## 2021-01-04 ENCOUNTER — PATIENT MESSAGE (OUTPATIENT)
Dept: ADMINISTRATIVE | Facility: HOSPITAL | Age: 41
End: 2021-01-04

## 2021-02-09 DIAGNOSIS — R00.2 PALPITATIONS: ICD-10-CM

## 2021-02-09 DIAGNOSIS — I49.1 PREMATURE ATRIAL CONTRACTIONS: Chronic | ICD-10-CM

## 2021-02-09 RX ORDER — METOPROLOL SUCCINATE 25 MG/1
25 TABLET, EXTENDED RELEASE ORAL DAILY
Qty: 90 TABLET | Refills: 3 | Status: SHIPPED | OUTPATIENT
Start: 2021-02-09 | End: 2022-03-10 | Stop reason: SDUPTHER

## 2021-04-05 ENCOUNTER — PATIENT MESSAGE (OUTPATIENT)
Dept: ADMINISTRATIVE | Facility: HOSPITAL | Age: 41
End: 2021-04-05

## 2021-05-04 ENCOUNTER — PATIENT MESSAGE (OUTPATIENT)
Dept: RESEARCH | Facility: HOSPITAL | Age: 41
End: 2021-05-04

## 2021-05-11 ENCOUNTER — PATIENT OUTREACH (OUTPATIENT)
Dept: ADMINISTRATIVE | Facility: OTHER | Age: 41
End: 2021-05-11

## 2021-07-06 ENCOUNTER — PATIENT MESSAGE (OUTPATIENT)
Dept: ADMINISTRATIVE | Facility: HOSPITAL | Age: 41
End: 2021-07-06

## 2021-07-19 ENCOUNTER — OFFICE VISIT (OUTPATIENT)
Dept: INTERNAL MEDICINE | Facility: CLINIC | Age: 41
End: 2021-07-19
Payer: COMMERCIAL

## 2021-07-19 VITALS
HEIGHT: 64 IN | BODY MASS INDEX: 38.76 KG/M2 | SYSTOLIC BLOOD PRESSURE: 120 MMHG | DIASTOLIC BLOOD PRESSURE: 80 MMHG | WEIGHT: 227 LBS

## 2021-07-19 DIAGNOSIS — I47.29 NONSUSTAINED VENTRICULAR TACHYCARDIA: Chronic | ICD-10-CM

## 2021-07-19 DIAGNOSIS — E66.01 SEVERE OBESITY (BMI 35.0-39.9) WITH COMORBIDITY: ICD-10-CM

## 2021-07-19 DIAGNOSIS — I10 ESSENTIAL HYPERTENSION: Chronic | ICD-10-CM

## 2021-07-19 DIAGNOSIS — B02.9 HERPES ZOSTER WITHOUT COMPLICATION: Primary | ICD-10-CM

## 2021-07-19 DIAGNOSIS — K76.0 FATTY LIVER: ICD-10-CM

## 2021-07-19 DIAGNOSIS — R74.8 ELEVATED LIVER ENZYMES: ICD-10-CM

## 2021-07-19 PROCEDURE — 1125F AMNT PAIN NOTED PAIN PRSNT: CPT | Mod: CPTII,S$GLB,, | Performed by: INTERNAL MEDICINE

## 2021-07-19 PROCEDURE — 99999 PR PBB SHADOW E&M-EST. PATIENT-LVL III: ICD-10-PCS | Mod: PBBFAC,,, | Performed by: INTERNAL MEDICINE

## 2021-07-19 PROCEDURE — 3079F DIAST BP 80-89 MM HG: CPT | Mod: CPTII,S$GLB,, | Performed by: INTERNAL MEDICINE

## 2021-07-19 PROCEDURE — 3008F PR BODY MASS INDEX (BMI) DOCUMENTED: ICD-10-PCS | Mod: CPTII,S$GLB,, | Performed by: INTERNAL MEDICINE

## 2021-07-19 PROCEDURE — 1125F PR PAIN SEVERITY QUANTIFIED, PAIN PRESENT: ICD-10-PCS | Mod: CPTII,S$GLB,, | Performed by: INTERNAL MEDICINE

## 2021-07-19 PROCEDURE — 3074F SYST BP LT 130 MM HG: CPT | Mod: CPTII,S$GLB,, | Performed by: INTERNAL MEDICINE

## 2021-07-19 PROCEDURE — 99999 PR PBB SHADOW E&M-EST. PATIENT-LVL III: CPT | Mod: PBBFAC,,, | Performed by: INTERNAL MEDICINE

## 2021-07-19 PROCEDURE — 3079F PR MOST RECENT DIASTOLIC BLOOD PRESSURE 80-89 MM HG: ICD-10-PCS | Mod: CPTII,S$GLB,, | Performed by: INTERNAL MEDICINE

## 2021-07-19 PROCEDURE — 99214 OFFICE O/P EST MOD 30 MIN: CPT | Mod: S$GLB,,, | Performed by: INTERNAL MEDICINE

## 2021-07-19 PROCEDURE — 3074F PR MOST RECENT SYSTOLIC BLOOD PRESSURE < 130 MM HG: ICD-10-PCS | Mod: CPTII,S$GLB,, | Performed by: INTERNAL MEDICINE

## 2021-07-19 PROCEDURE — 3008F BODY MASS INDEX DOCD: CPT | Mod: CPTII,S$GLB,, | Performed by: INTERNAL MEDICINE

## 2021-07-19 PROCEDURE — 99214 PR OFFICE/OUTPT VISIT, EST, LEVL IV, 30-39 MIN: ICD-10-PCS | Mod: S$GLB,,, | Performed by: INTERNAL MEDICINE

## 2021-07-19 RX ORDER — VALACYCLOVIR HYDROCHLORIDE 1 G/1
1000 TABLET, FILM COATED ORAL 3 TIMES DAILY
Qty: 21 TABLET | Refills: 0 | Status: SHIPPED | OUTPATIENT
Start: 2021-07-19 | End: 2022-01-12

## 2021-08-05 ENCOUNTER — OCCUPATIONAL HEALTH (OUTPATIENT)
Dept: URGENT CARE | Facility: CLINIC | Age: 41
End: 2021-08-05

## 2021-08-05 DIAGNOSIS — Z02.1 PRE-EMPLOYMENT EXAMINATION: Primary | ICD-10-CM

## 2021-08-05 PROCEDURE — 94010 BREATHING CAPACITY TEST: CPT | Mod: S$GLB,,, | Performed by: NURSE PRACTITIONER

## 2021-08-05 PROCEDURE — 89240 PANEL 3 (CMP, CBCW/DIFF, MUA, LIPID): ICD-10-PCS | Mod: S$GLB,,, | Performed by: NURSE PRACTITIONER

## 2021-08-05 PROCEDURE — 93000 ELECTROCARDIOGRAM COMPLETE: CPT | Mod: S$GLB,,, | Performed by: INTERNAL MEDICINE

## 2021-08-05 PROCEDURE — 92552 PURE TONE AUDIOMETRY AIR: CPT | Mod: S$GLB,,, | Performed by: NURSE PRACTITIONER

## 2021-08-05 PROCEDURE — 84443 TSH: ICD-10-PCS | Mod: QW,S$GLB,, | Performed by: NURSE PRACTITIONER

## 2021-08-05 PROCEDURE — 36415 VENIPUNCTURE: ICD-10-PCS | Mod: S$GLB,,, | Performed by: NURSE PRACTITIONER

## 2021-08-05 PROCEDURE — 84443 ASSAY THYROID STIM HORMONE: CPT | Mod: QW,S$GLB,, | Performed by: NURSE PRACTITIONER

## 2021-08-05 PROCEDURE — 92552 AUDIOGRAM OCC MED: ICD-10-PCS | Mod: S$GLB,,, | Performed by: NURSE PRACTITIONER

## 2021-08-05 PROCEDURE — 82977 GAMMA GT: ICD-10-PCS | Mod: QW,S$GLB,, | Performed by: NURSE PRACTITIONER

## 2021-08-05 PROCEDURE — 99000 SPECIMEN HANDLING OFFICE-LAB: CPT | Mod: S$GLB,,, | Performed by: NURSE PRACTITIONER

## 2021-08-05 PROCEDURE — 94010 PULMONARY FUNCTION SCREENING (OCC MED PHYSICALS): ICD-10-PCS | Mod: S$GLB,,, | Performed by: NURSE PRACTITIONER

## 2021-08-05 PROCEDURE — 99000 SPECIMEN HANDLING: ICD-10-PCS | Mod: S$GLB,,, | Performed by: NURSE PRACTITIONER

## 2021-08-05 PROCEDURE — 99499 PHYSICAL, BASIC COMPLEXITY: ICD-10-PCS | Mod: S$GLB,,, | Performed by: NURSE PRACTITIONER

## 2021-08-05 PROCEDURE — 99499 UNLISTED E&M SERVICE: CPT | Mod: S$GLB,,, | Performed by: NURSE PRACTITIONER

## 2021-08-05 PROCEDURE — 36415 COLL VENOUS BLD VENIPUNCTURE: CPT | Mod: S$GLB,,, | Performed by: NURSE PRACTITIONER

## 2021-08-05 PROCEDURE — 89240 UNLISTED MISC PATH TEST: CPT | Mod: S$GLB,,, | Performed by: NURSE PRACTITIONER

## 2021-08-05 PROCEDURE — 82977 ASSAY OF GGT: CPT | Mod: QW,S$GLB,, | Performed by: NURSE PRACTITIONER

## 2021-08-05 PROCEDURE — 99080 OSHA QUESTIONNAIRE: ICD-10-PCS | Mod: S$GLB,,, | Performed by: NURSE PRACTITIONER

## 2021-08-05 PROCEDURE — 99080 SPECIAL REPORTS OR FORMS: CPT | Mod: S$GLB,,, | Performed by: NURSE PRACTITIONER

## 2021-08-05 PROCEDURE — 93000 EKG 12-LEAD: ICD-10-PCS | Mod: S$GLB,,, | Performed by: INTERNAL MEDICINE

## 2021-08-05 PROCEDURE — 86140 C-REACTIVE PROTEIN: CPT | Mod: S$GLB,,, | Performed by: NURSE PRACTITIONER

## 2021-08-05 PROCEDURE — 86140 C-REACTIVE PROTEIN: ICD-10-PCS | Mod: S$GLB,,, | Performed by: NURSE PRACTITIONER

## 2021-08-06 ENCOUNTER — TELEPHONE (OUTPATIENT)
Dept: URGENT CARE | Facility: CLINIC | Age: 41
End: 2021-08-06

## 2021-10-04 ENCOUNTER — PATIENT MESSAGE (OUTPATIENT)
Dept: ADMINISTRATIVE | Facility: HOSPITAL | Age: 41
End: 2021-10-04

## 2021-12-16 ENCOUNTER — PATIENT MESSAGE (OUTPATIENT)
Dept: PSYCHIATRY | Facility: CLINIC | Age: 41
End: 2021-12-16
Payer: COMMERCIAL

## 2021-12-16 DIAGNOSIS — F33.41 RECURRENT MAJOR DEPRESSIVE DISORDER, IN PARTIAL REMISSION: ICD-10-CM

## 2021-12-16 DIAGNOSIS — F41.1 GAD (GENERALIZED ANXIETY DISORDER): ICD-10-CM

## 2021-12-16 DIAGNOSIS — F42.8 OBSESSIVE THINKING: ICD-10-CM

## 2021-12-17 RX ORDER — ESCITALOPRAM OXALATE 10 MG/1
10 TABLET ORAL DAILY
Qty: 30 TABLET | Refills: 0 | Status: SHIPPED | OUTPATIENT
Start: 2021-12-17 | End: 2022-01-12 | Stop reason: SDUPTHER

## 2022-01-01 NOTE — PROGRESS NOTES
Patient identified by 2 identifiers. Denies previous reactions to blood transfusions and allergies reviewed.  Procedure explained & consent obtained.  22 g IV placed to Lt FA, flushed w/ 10cc NS pre & post contrast administration.  3cc Optison administered, echo images obtained.  Pt tolerated procedure well.  IV D/C'ed, preasure dsg applied.  Pt D/C'ed to home.   15

## 2022-01-12 ENCOUNTER — OFFICE VISIT (OUTPATIENT)
Dept: PSYCHIATRY | Facility: CLINIC | Age: 42
End: 2022-01-12
Payer: COMMERCIAL

## 2022-01-12 DIAGNOSIS — F33.42 RECURRENT MAJOR DEPRESSIVE DISORDER, IN FULL REMISSION: ICD-10-CM

## 2022-01-12 DIAGNOSIS — F95.2 TOURETTE'S SYNDROME: ICD-10-CM

## 2022-01-12 DIAGNOSIS — F42.8 OBSESSIVE THINKING: ICD-10-CM

## 2022-01-12 DIAGNOSIS — F41.1 GAD (GENERALIZED ANXIETY DISORDER): Primary | ICD-10-CM

## 2022-01-12 PROCEDURE — 1159F MED LIST DOCD IN RCRD: CPT | Mod: CPTII,95,, | Performed by: INTERNAL MEDICINE

## 2022-01-12 PROCEDURE — 99214 OFFICE O/P EST MOD 30 MIN: CPT | Mod: 95,,, | Performed by: INTERNAL MEDICINE

## 2022-01-12 PROCEDURE — 1159F PR MEDICATION LIST DOCUMENTED IN MEDICAL RECORD: ICD-10-PCS | Mod: CPTII,95,, | Performed by: INTERNAL MEDICINE

## 2022-01-12 PROCEDURE — 99214 PR OFFICE/OUTPT VISIT, EST, LEVL IV, 30-39 MIN: ICD-10-PCS | Mod: 95,,, | Performed by: INTERNAL MEDICINE

## 2022-01-12 PROCEDURE — 1160F PR REVIEW ALL MEDS BY PRESCRIBER/CLIN PHARMACIST DOCUMENTED: ICD-10-PCS | Mod: CPTII,95,, | Performed by: INTERNAL MEDICINE

## 2022-01-12 PROCEDURE — 1160F RVW MEDS BY RX/DR IN RCRD: CPT | Mod: CPTII,95,, | Performed by: INTERNAL MEDICINE

## 2022-01-12 RX ORDER — ALPRAZOLAM 0.25 MG/1
0.25 TABLET ORAL NIGHTLY PRN
Qty: 15 TABLET | Refills: 0 | Status: SHIPPED | OUTPATIENT
Start: 2022-01-12 | End: 2023-02-27 | Stop reason: SDUPTHER

## 2022-01-12 RX ORDER — ESCITALOPRAM OXALATE 10 MG/1
10 TABLET ORAL DAILY
Qty: 90 TABLET | Refills: 3 | Status: SHIPPED | OUTPATIENT
Start: 2022-01-12 | End: 2023-02-27 | Stop reason: SDUPTHER

## 2022-01-12 NOTE — PROGRESS NOTES
OUTPATIENT PSYCHIATRY RETURN VISIT    ENCOUNTER DATE:  1/12/2022  SITE:  [facility], [facility]  LENGTH OF SESSION:  10 minutes    The patient location is:  [place]  The chief complaint leading to consultation is:  Anxiety follow up    Visit type:  audiovisual    Face to Face time with patient:  10 minutes  15 minutes of total time spent on the encounter, which includes face to face time and non-face to face time preparing to see the patient (eg, review of tests), Obtaining and/or reviewing separately obtained history, Documenting clinical information in the electronic or other health record, Independently interpreting results (not separately reported) and communicating results to the patient/family/caregiver, or Care coordination (not separately reported).     Each patient to whom he or she provides medical services by telemedicine is:  (1) informed of the relationship between the physician and patient and the respective role of any other health care provider with respect to management of the patient; and (2) notified that he or she may decline to receive medical services by telemedicine and may withdraw from such care at any time.    CHIEF COMPLAINT:  Follow-up      HISTORY OF PRESENTING ILLNESS:  Zo Martínez is a 41 y.o. female with history of Depression, Anxiety, Insomnia, and Tourette's who presents for follow up appointment.     Plan at last appointment on 11/30/2020:  · Symptoms well controlled.  · Continue Lexapro 10mg daily.  · Discussed with patient informed consent, risks versus benefits, alternative treatments, side effect profile and the inherent unpredictability of individual responses to these treatments.  The patient expresses understanding of the above and displays the capacity to agree with this current plan.    History as told by patient:  Still doing ok.  Had some weight gain.  Feels it is just laziness - can't say its the medication.  Restarted a diet.  Has decreased libido.   Definitely feels it is helping her anxiety.  Last week woke up having a panic attack - had not had one in a long time.  Woke up with heartburn, then starts worrying about heart attack, led to panic.  Had health anxiety about COVID, especially with Down Syndrome daughter, but this has improved.  Perimenopausal - 1 year of hot flashes, irregular periods.  Has had restless leg more.  Sleeping well, occasional Melatonin.      Medication side effects:  No  Medication compliance:  Yes    PSYCHIATRIC REVIEW OF SYSTEMS:  Trouble with sleep:  Denies  Appetite changes:  Denies  Weight changes:  Gain  Lack of energy:  Denies  Anhedonia:  Denies  Somatic symptoms:  Denies  Libido:  Decreased  Anxiety/panic:  Occasional as above  Guilty/hopeless:  Denies  Self-injurious behavior/risky behavior:  Denies  Any drugs:  Denies  Alcohol:  Denies    MEDICAL REVIEW OF SYSTEMS:  Complete review of systems performed covering Constitutional, Musculoskeletal, Neurologic.  All systems negative except for that covered in HPI.    PAST PSYCHIATRIC, MEDICAL, AND SOCIAL HISTORY REVIEWED  The patient's past medical, family and social history have been reviewed and updated as appropriate within the electronic medical record - see encounter notes.    MEDICATIONS:    Current Outpatient Medications:     ALPRAZolam (XANAX) 0.25 MG tablet, Take 1 tablet (0.25 mg total) by mouth nightly as needed for Anxiety., Disp: 15 tablet, Rfl: 0    EScitalopram oxalate (LEXAPRO) 10 MG tablet, Take 1 tablet (10 mg total) by mouth once daily., Disp: 90 tablet, Rfl: 3    metoprolol succinate (TOPROL-XL) 25 MG 24 hr tablet, Take 1 tablet (25 mg total) by mouth once daily., Disp: 90 tablet, Rfl: 3    valACYclovir (VALTREX) 1000 MG tablet, Take 1 tablet (1,000 mg total) by mouth 3 (three) times daily., Disp: 21 tablet, Rfl: 0    ALLERGIES:  Review of patient's allergies indicates:  No Known Allergies    PSYCHIATRIC EXAM:  There were no vitals filed for this  "visit.  Appearance:  Well groomed, appearing healthy and of stated age  Behavior:  Cooperative, pleasant, no psychomotor agitation or retardation  Speech:  Normal rate, rhythm, prosody, and volume  Mood:  "Good"  Affect:  Congruent  Thought Process:  Linear, logical, goal directed  Thought Content:  Negative for suicidal ideation, homicidal ideation, delusions or hallucinations.  Associations:  Intact  Memory:  Grossly Intact  Level of Consciousness/Orientation:  Grossly intact  Fund of Knowledge:  Good  Attention:  Good  Language:  Fluent, able to name abstract and concrete objects  Insight:  Good  Judgment:  Intact  Psychomotor signs:  No abnormal movements of face  Gait:  Unable to assess via virtual visit      RELEVANT LABS/STUDIES:    Lab Results   Component Value Date    WBC 10.00 10/12/2020    HGB 14.1 10/12/2020    HCT 41.8 10/12/2020    MCV 87 10/12/2020     10/12/2020     BMP  Lab Results   Component Value Date     10/12/2020    K 3.9 10/12/2020     10/12/2020    CO2 25 10/12/2020    BUN 15 10/12/2020    CREATININE 0.80 10/12/2020    CALCIUM 9.5 10/12/2020    ANIONGAP 8 10/31/2019    ESTGFRAFRICA >60 10/12/2020    EGFRNONAA >60 10/12/2020     Lab Results   Component Value Date    ALT 58 (H) 10/12/2020    AST 44 (H) 10/12/2020    GGT 72 (H) 08/06/2019    ALKPHOS 111 10/12/2020    BILITOT 0.6 10/12/2020     Lab Results   Component Value Date    TSH 1.50 07/06/2020     Lab Results   Component Value Date    HGBA1C 6.1 (H) 08/16/2019       IMPRESSION:    Zo Martínez is a 41 y.o. female with history of Depression, Anxiety, Insomnia, and Tourette's who presents for follow up appointment.    Status/Progress:  Based on the examination today, the patient's problem(s) is/are well controlled.  New problems have not been presented today.    Risk Parameters:  Patient reports no suicidal ideation  Patient reports no homicidal ideation  Patient reports no self-injurious behavior  Patient reports no " violent behavior      DIAGNOSES:    ICD-10-CM ICD-9-CM   1. SKYE (generalized anxiety disorder)  F41.1 300.02   2. Obsessive thinking  F42.8 300.3   3. Recurrent major depressive disorder, in full remission  F33.42 296.36   4. Tourette's syndrome  F95.2 307.23       PLAN:  · Symptoms well controlled.  · Continue Lexapro 10mg daily.  · Discussed with patient informed consent, risks versus benefits, alternative treatments, side effect profile and the inherent unpredictability of individual responses to these treatments.  The patient expresses understanding of the above and displays the capacity to agree with this current plan.    RETURN TO CLINIC:  Follow up in about 1 year (around 1/12/2023).

## 2022-01-16 ENCOUNTER — PATIENT MESSAGE (OUTPATIENT)
Dept: INTERNAL MEDICINE | Facility: CLINIC | Age: 42
End: 2022-01-16
Payer: COMMERCIAL

## 2022-01-18 ENCOUNTER — PATIENT MESSAGE (OUTPATIENT)
Dept: ADMINISTRATIVE | Facility: OTHER | Age: 42
End: 2022-01-18
Payer: COMMERCIAL

## 2022-01-18 ENCOUNTER — PATIENT MESSAGE (OUTPATIENT)
Dept: ADMINISTRATIVE | Facility: HOSPITAL | Age: 42
End: 2022-01-18
Payer: COMMERCIAL

## 2022-01-18 ENCOUNTER — PATIENT MESSAGE (OUTPATIENT)
Dept: ADMINISTRATIVE | Facility: CLINIC | Age: 42
End: 2022-01-18
Payer: COMMERCIAL

## 2022-01-19 ENCOUNTER — PATIENT MESSAGE (OUTPATIENT)
Dept: ADMINISTRATIVE | Facility: OTHER | Age: 42
End: 2022-01-19
Payer: COMMERCIAL

## 2022-01-19 ENCOUNTER — PATIENT MESSAGE (OUTPATIENT)
Dept: ADMINISTRATIVE | Facility: CLINIC | Age: 42
End: 2022-01-19
Payer: COMMERCIAL

## 2022-01-20 ENCOUNTER — PATIENT MESSAGE (OUTPATIENT)
Dept: ADMINISTRATIVE | Facility: CLINIC | Age: 42
End: 2022-01-20
Payer: COMMERCIAL

## 2022-01-20 ENCOUNTER — NURSE TRIAGE (OUTPATIENT)
Dept: ADMINISTRATIVE | Facility: CLINIC | Age: 42
End: 2022-01-20
Payer: COMMERCIAL

## 2022-03-02 LAB
HPV 16/18: NORMAL
PAP RECOMMENDATION EXT: NORMAL

## 2022-03-09 ENCOUNTER — TELEPHONE (OUTPATIENT)
Dept: ELECTROPHYSIOLOGY | Facility: CLINIC | Age: 42
End: 2022-03-09
Payer: COMMERCIAL

## 2022-03-10 ENCOUNTER — OFFICE VISIT (OUTPATIENT)
Dept: ELECTROPHYSIOLOGY | Facility: CLINIC | Age: 42
End: 2022-03-10
Payer: COMMERCIAL

## 2022-03-10 ENCOUNTER — HOSPITAL ENCOUNTER (OUTPATIENT)
Dept: CARDIOLOGY | Facility: CLINIC | Age: 42
Discharge: HOME | End: 2022-03-10
Payer: COMMERCIAL

## 2022-03-10 VITALS
WEIGHT: 235.88 LBS | SYSTOLIC BLOOD PRESSURE: 135 MMHG | DIASTOLIC BLOOD PRESSURE: 65 MMHG | BODY MASS INDEX: 40.27 KG/M2 | HEART RATE: 64 BPM | HEIGHT: 64 IN

## 2022-03-10 DIAGNOSIS — I10 ESSENTIAL HYPERTENSION: Chronic | ICD-10-CM

## 2022-03-10 DIAGNOSIS — I49.8 OTHER SPECIFIED CARDIAC ARRHYTHMIAS: ICD-10-CM

## 2022-03-10 DIAGNOSIS — I49.1 PREMATURE ATRIAL CONTRACTIONS: Primary | Chronic | ICD-10-CM

## 2022-03-10 DIAGNOSIS — R00.2 PALPITATIONS: ICD-10-CM

## 2022-03-10 PROCEDURE — 99999 PR PBB SHADOW E&M-EST. PATIENT-LVL III: ICD-10-PCS | Mod: PBBFAC,,, | Performed by: INTERNAL MEDICINE

## 2022-03-10 PROCEDURE — 3008F PR BODY MASS INDEX (BMI) DOCUMENTED: ICD-10-PCS | Mod: CPTII,S$GLB,, | Performed by: INTERNAL MEDICINE

## 2022-03-10 PROCEDURE — 3078F PR MOST RECENT DIASTOLIC BLOOD PRESSURE < 80 MM HG: ICD-10-PCS | Mod: CPTII,S$GLB,, | Performed by: INTERNAL MEDICINE

## 2022-03-10 PROCEDURE — 93010 ELECTROCARDIOGRAM REPORT: CPT | Mod: S$GLB,,, | Performed by: INTERNAL MEDICINE

## 2022-03-10 PROCEDURE — 3078F DIAST BP <80 MM HG: CPT | Mod: CPTII,S$GLB,, | Performed by: INTERNAL MEDICINE

## 2022-03-10 PROCEDURE — 1160F PR REVIEW ALL MEDS BY PRESCRIBER/CLIN PHARMACIST DOCUMENTED: ICD-10-PCS | Mod: CPTII,S$GLB,, | Performed by: INTERNAL MEDICINE

## 2022-03-10 PROCEDURE — 93005 RHYTHM STRIP: ICD-10-PCS | Mod: S$GLB,,, | Performed by: INTERNAL MEDICINE

## 2022-03-10 PROCEDURE — 93005 ELECTROCARDIOGRAM TRACING: CPT | Mod: S$GLB,,, | Performed by: INTERNAL MEDICINE

## 2022-03-10 PROCEDURE — 99214 PR OFFICE/OUTPT VISIT, EST, LEVL IV, 30-39 MIN: ICD-10-PCS | Mod: S$GLB,,, | Performed by: INTERNAL MEDICINE

## 2022-03-10 PROCEDURE — 99999 PR PBB SHADOW E&M-EST. PATIENT-LVL III: CPT | Mod: PBBFAC,,, | Performed by: INTERNAL MEDICINE

## 2022-03-10 PROCEDURE — 93010 RHYTHM STRIP: ICD-10-PCS | Mod: S$GLB,,, | Performed by: INTERNAL MEDICINE

## 2022-03-10 PROCEDURE — 3075F SYST BP GE 130 - 139MM HG: CPT | Mod: CPTII,S$GLB,, | Performed by: INTERNAL MEDICINE

## 2022-03-10 PROCEDURE — 1160F RVW MEDS BY RX/DR IN RCRD: CPT | Mod: CPTII,S$GLB,, | Performed by: INTERNAL MEDICINE

## 2022-03-10 PROCEDURE — 99214 OFFICE O/P EST MOD 30 MIN: CPT | Mod: S$GLB,,, | Performed by: INTERNAL MEDICINE

## 2022-03-10 PROCEDURE — 1159F PR MEDICATION LIST DOCUMENTED IN MEDICAL RECORD: ICD-10-PCS | Mod: CPTII,S$GLB,, | Performed by: INTERNAL MEDICINE

## 2022-03-10 PROCEDURE — 3008F BODY MASS INDEX DOCD: CPT | Mod: CPTII,S$GLB,, | Performed by: INTERNAL MEDICINE

## 2022-03-10 PROCEDURE — 3075F PR MOST RECENT SYSTOLIC BLOOD PRESS GE 130-139MM HG: ICD-10-PCS | Mod: CPTII,S$GLB,, | Performed by: INTERNAL MEDICINE

## 2022-03-10 PROCEDURE — 1159F MED LIST DOCD IN RCRD: CPT | Mod: CPTII,S$GLB,, | Performed by: INTERNAL MEDICINE

## 2022-03-10 RX ORDER — METOPROLOL SUCCINATE 25 MG/1
25 TABLET, EXTENDED RELEASE ORAL DAILY
Qty: 90 TABLET | Refills: 3 | Status: SHIPPED | OUTPATIENT
Start: 2022-03-10 | End: 2023-03-22

## 2022-03-10 NOTE — PROGRESS NOTES
"Subjective:    Patient ID:  Zo Martínez is a 42 y.o. female who presents for follow-up of Palpitations    Referring Physician: Guillermina Zuniga MD    HPI  Prior Hx:  I had the pleasure of seeing Mrs. Martínez today in our electrophysiology clinic in consultation for her palpitations and chest pain.  As you are aware she is a pleasant 42 year-old woman with long-history of palpitations, hypertension, depression, and obesity.  She has noted for years to have episodes of skipped beats that occur without an identifiable trigger.  Some days are better than other days.  They are associated with a "sinking" or "dropping" sensation of her heart in her chest.  She has cut out the caffeine from her diet which has not effected her symptoms.  She also has occasional episodes of more sustained palpitations that last for a bit but <30 seconds.  Then recently she had an episode of squeezing/pushing chest pressure in the center of her chest while driving with associated shortness of breath that lasted for ~30 seconds.  A 48 hour holter monitor was performed.  Day 1 revealed 1 PAC.  Day 2 revealed 1108 PACs.  She noted that neither day was truly representative of her typical symptom burden.  Moreover she did not have any of the more "sustained" episodes while wearing the monitor.  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 She reports having early blood pressure in her family but does not believe anyone had a heart attack at a young age. She is a lifelong non-smoker. She is not physically active however wants to start exercising and lose weight.    Mrs. Martínez elected to wear a 14 day ZIO patch which noted rare PACS/PVCS, 1 6 beat run of AT, and 1 4 beat run of WCT, likely non-sustained VT.  There were however 49 patient triggered events with the majority correlating to sinus rhythm. We rx her atenolol as metoprolol had an interaction with bupropion. She had a treadmill exercise stress echo that " "showed a structurally normal heart without any ischemic changes. She presents for follow-up. She reports feeling much better on atenolol and has very few palpitations. She reports she and her  do not prevent pregnancy.    Interim Hx:  Mrs. Martínez presents for 1 year follow.  Palpitations are well controlled on low-dose metoprolol. She reports a few episodes over the past few weeks where her heart feels like its "buzzing" that can last for 1-2 minutes. She is taking testosterone supplement.     My interpretation of today's in clinic electrocardiogram is normal sinus rhythm with a rate of 56 bpm.    Review of Systems   Constitutional: Negative for malaise/fatigue.   HENT: Negative for congestion and sore throat.    Eyes: Negative for blurred vision and visual disturbance.   Cardiovascular: Positive for palpitations (better with metoprolol). Negative for chest pain, dyspnea on exertion, irregular heartbeat, leg swelling, near-syncope, orthopnea, paroxysmal nocturnal dyspnea and syncope.   Respiratory: Negative for cough and shortness of breath.    Hematologic/Lymphatic: Negative for bleeding problem. Does not bruise/bleed easily.   Skin: Negative.    Musculoskeletal: Negative.    Gastrointestinal: Negative for bloating and abdominal pain.   Neurological: Negative for dizziness, light-headedness and weakness.        Objective:    Physical Exam  Vitals reviewed.   Constitutional:       General: She is not in acute distress.     Appearance: She is well-developed. She is not diaphoretic.   HENT:      Head: Normocephalic and atraumatic.   Eyes:      General:         Right eye: No discharge.         Left eye: No discharge.      Conjunctiva/sclera: Conjunctivae normal.   Neck:      Vascular: No JVD.   Cardiovascular:      Rate and Rhythm: Normal rate and regular rhythm.      Heart sounds: Normal heart sounds. No murmur heard.    No friction rub. No gallop.   Pulmonary:      Effort: Pulmonary effort is normal. No " respiratory distress.      Breath sounds: Normal breath sounds. No wheezing or rales.   Abdominal:      General: Bowel sounds are normal. There is no distension.      Palpations: Abdomen is soft.      Tenderness: There is no abdominal tenderness. There is no rebound.   Musculoskeletal:      Cervical back: Neck supple.   Skin:     General: Skin is warm and dry.   Neurological:      Mental Status: She is alert and oriented to person, place, and time.   Psychiatric:         Behavior: Behavior normal.         Thought Content: Thought content normal.         Judgment: Judgment normal.           Assessment:       1. Premature atrial contractions    2. Essential hypertension         Plan:         In summary, Mrs. Martínez is a pleasant 42 year-old woman with long-history of palpitations, depression, HANG, ALBERTO, and obesity presenting for palpitations and chest discomfort.  Most of her symptoms correlate to sinus rhythm on her monitor.  However some may be from PACs/PVCs. She had 1 4 beat run of likely NSVT on her monitor. Her exercise stress echo was unremarkable for ischemia. Symptoms controlled on low-dose metoprolol. Will get a 14 day holter to evaluate new symptoms. Will call with results. Continue same. RTC in 1 year, sooner if needed.    Thank you for allowing me to participate in the care of this patient. Please do not hesitate to call me with any questions or concerns.    Chance Martínez MD, PhD  Cardiac Electrophysiology

## 2022-03-16 ENCOUNTER — PATIENT MESSAGE (OUTPATIENT)
Dept: ADMINISTRATIVE | Facility: HOSPITAL | Age: 42
End: 2022-03-16
Payer: COMMERCIAL

## 2022-03-21 NOTE — TELEPHONE ENCOUNTER
My Chart message received from pt requesting to opt out of Surveillance Program. Tasks removed per pt request.     Reason for Disposition   Caller has cancelled the call before the first contact    Additional Information   Negative: Caller has already spoken with the PCP and has no further questions.   Negative: Caller has already spoken with another triager and has no further questions.   Negative: Caller has already spoken with another triager or PCP AND has further questions AND triager able to answer questions.    Protocols used: NO CONTACT OR DUPLICATE CONTACT CALL-A-AH      
<-- Click to add NO significant Past Surgical History

## 2022-05-30 ENCOUNTER — PATIENT MESSAGE (OUTPATIENT)
Dept: ADMINISTRATIVE | Facility: HOSPITAL | Age: 42
End: 2022-05-30
Payer: COMMERCIAL

## 2022-07-11 ENCOUNTER — PATIENT MESSAGE (OUTPATIENT)
Dept: ADMINISTRATIVE | Facility: HOSPITAL | Age: 42
End: 2022-07-11
Payer: COMMERCIAL

## 2022-07-12 ENCOUNTER — OCCUPATIONAL HEALTH (OUTPATIENT)
Dept: URGENT CARE | Facility: CLINIC | Age: 42
End: 2022-07-12

## 2022-07-12 DIAGNOSIS — Z00.00 ENCOUNTER FOR PHYSICAL EXAMINATION: Primary | ICD-10-CM

## 2022-07-12 PROCEDURE — 93000 EKG 12-LEAD: ICD-10-PCS | Mod: S$GLB,,, | Performed by: INTERNAL MEDICINE

## 2022-07-12 PROCEDURE — 84443 TSH: ICD-10-PCS | Mod: QW,S$GLB,, | Performed by: EMERGENCY MEDICINE

## 2022-07-12 PROCEDURE — 99499 PHYSICAL, BASIC COMPLEXITY: ICD-10-PCS | Mod: S$GLB,,, | Performed by: EMERGENCY MEDICINE

## 2022-07-12 PROCEDURE — 99000 SPECIMEN HANDLING OFFICE-LAB: CPT | Mod: S$GLB,,, | Performed by: EMERGENCY MEDICINE

## 2022-07-12 PROCEDURE — 93000 ELECTROCARDIOGRAM COMPLETE: CPT | Mod: S$GLB,,, | Performed by: INTERNAL MEDICINE

## 2022-07-12 PROCEDURE — 89240 UNLISTED MISC PATH TEST: CPT | Mod: QW,S$GLB,, | Performed by: EMERGENCY MEDICINE

## 2022-07-12 PROCEDURE — 94010 BREATHING CAPACITY TEST: CPT | Mod: S$GLB,,, | Performed by: EMERGENCY MEDICINE

## 2022-07-12 PROCEDURE — 99000 SPECIMEN HANDLING: ICD-10-PCS | Mod: S$GLB,,, | Performed by: EMERGENCY MEDICINE

## 2022-07-12 PROCEDURE — 71045 XR CHEST 1 VIEW: ICD-10-PCS | Mod: S$GLB,,, | Performed by: RADIOLOGY

## 2022-07-12 PROCEDURE — 89240 COMPREHENSIVE METABOLIC PANEL: ICD-10-PCS | Mod: QW,S$GLB,, | Performed by: EMERGENCY MEDICINE

## 2022-07-12 PROCEDURE — 71045 X-RAY EXAM CHEST 1 VIEW: CPT | Mod: S$GLB,,, | Performed by: RADIOLOGY

## 2022-07-12 PROCEDURE — 94010 PULMONARY FUNCTION SCREENING (OCC MED PHYSICALS): ICD-10-PCS | Mod: S$GLB,,, | Performed by: EMERGENCY MEDICINE

## 2022-07-12 PROCEDURE — 99499 UNLISTED E&M SERVICE: CPT | Mod: S$GLB,,, | Performed by: EMERGENCY MEDICINE

## 2022-07-12 PROCEDURE — 86140 C-REACTIVE PROTEIN: ICD-10-PCS | Mod: S$GLB,,, | Performed by: EMERGENCY MEDICINE

## 2022-07-12 PROCEDURE — 92552 PURE TONE AUDIOMETRY AIR: CPT | Mod: S$GLB,,, | Performed by: EMERGENCY MEDICINE

## 2022-07-12 PROCEDURE — 82977 ASSAY OF GGT: CPT | Mod: QW,S$GLB,, | Performed by: EMERGENCY MEDICINE

## 2022-07-12 PROCEDURE — 84443 ASSAY THYROID STIM HORMONE: CPT | Mod: QW,S$GLB,, | Performed by: EMERGENCY MEDICINE

## 2022-07-12 PROCEDURE — 36415 VENIPUNCTURE: ICD-10-PCS | Mod: S$GLB,,, | Performed by: EMERGENCY MEDICINE

## 2022-07-12 PROCEDURE — 72100 X-RAY EXAM L-S SPINE 2/3 VWS: CPT | Mod: S$GLB,,, | Performed by: RADIOLOGY

## 2022-07-12 PROCEDURE — 82977 GAMMA GT: ICD-10-PCS | Mod: QW,S$GLB,, | Performed by: EMERGENCY MEDICINE

## 2022-07-12 PROCEDURE — 86140 C-REACTIVE PROTEIN: CPT | Mod: S$GLB,,, | Performed by: EMERGENCY MEDICINE

## 2022-07-12 PROCEDURE — 92552 AUDIOGRAM OCC MED: ICD-10-PCS | Mod: S$GLB,,, | Performed by: EMERGENCY MEDICINE

## 2022-07-12 PROCEDURE — 72100 XR LUMBAR SPINE 2 OR 3 VIEWS: ICD-10-PCS | Mod: S$GLB,,, | Performed by: RADIOLOGY

## 2022-07-12 PROCEDURE — 36415 COLL VENOUS BLD VENIPUNCTURE: CPT | Mod: S$GLB,,, | Performed by: EMERGENCY MEDICINE

## 2022-07-12 NOTE — PROGRESS NOTES
sh      blp    X-Ray Chest 1 View    Result Date: 7/12/2022  EXAMINATION: XR CHEST 1 VIEW CLINICAL HISTORY: Encounter for general adult medical examination without abnormal findings TECHNIQUE: Single frontal view of the chest was performed. COMPARISON: 12/16/2015 FINDINGS: Heart and lungs are normal.  No infiltrate is seen.     Normal chest Electronically signed by: Sebastián Grossman MD Date:    07/12/2022 Time:    10:28    X-Ray Lumbar Spine 2 Or 3 Views    Result Date: 7/12/2022  EXAMINATION: XR LUMBAR SPINE 2 OR 3 VIEWS CLINICAL HISTORY: Encounter for general adult medical examination without abnormal findings TECHNIQUE: Lumbar spine 2 or three views COMPARISON: None FINDINGS: Vertebral bodies are intact.  Disc spaces are maintained.  No significant bony abnormalities.     See above Electronically signed by: Sebastián Grossman MD Date:    07/12/2022 Time:    10:27

## 2022-08-09 ENCOUNTER — OFFICE VISIT (OUTPATIENT)
Dept: INTERNAL MEDICINE | Facility: CLINIC | Age: 42
End: 2022-08-09
Payer: COMMERCIAL

## 2022-08-09 ENCOUNTER — TELEPHONE (OUTPATIENT)
Dept: HEPATOLOGY | Facility: CLINIC | Age: 42
End: 2022-08-09
Payer: COMMERCIAL

## 2022-08-09 ENCOUNTER — PATIENT MESSAGE (OUTPATIENT)
Dept: INTERNAL MEDICINE | Facility: CLINIC | Age: 42
End: 2022-08-09

## 2022-08-09 VITALS
RESPIRATION RATE: 16 BRPM | HEART RATE: 73 BPM | HEIGHT: 64 IN | WEIGHT: 240.5 LBS | SYSTOLIC BLOOD PRESSURE: 134 MMHG | DIASTOLIC BLOOD PRESSURE: 78 MMHG | BODY MASS INDEX: 41.06 KG/M2 | OXYGEN SATURATION: 96 %

## 2022-08-09 DIAGNOSIS — I47.29 NONSUSTAINED VENTRICULAR TACHYCARDIA: Chronic | ICD-10-CM

## 2022-08-09 DIAGNOSIS — G89.29 CHRONIC BILATERAL LOW BACK PAIN WITHOUT SCIATICA: ICD-10-CM

## 2022-08-09 DIAGNOSIS — F33.42 RECURRENT MAJOR DEPRESSIVE DISORDER, IN FULL REMISSION: ICD-10-CM

## 2022-08-09 DIAGNOSIS — Z00.00 HEALTHCARE MAINTENANCE: ICD-10-CM

## 2022-08-09 DIAGNOSIS — F41.1 GAD (GENERALIZED ANXIETY DISORDER): ICD-10-CM

## 2022-08-09 DIAGNOSIS — I49.1 PREMATURE ATRIAL CONTRACTIONS: Chronic | ICD-10-CM

## 2022-08-09 DIAGNOSIS — K76.0 FATTY LIVER: Primary | ICD-10-CM

## 2022-08-09 DIAGNOSIS — I10 ESSENTIAL HYPERTENSION: Chronic | ICD-10-CM

## 2022-08-09 DIAGNOSIS — E66.01 CLASS 3 SEVERE OBESITY DUE TO EXCESS CALORIES WITH SERIOUS COMORBIDITY AND BODY MASS INDEX (BMI) OF 40.0 TO 44.9 IN ADULT: ICD-10-CM

## 2022-08-09 DIAGNOSIS — M54.50 CHRONIC BILATERAL LOW BACK PAIN WITHOUT SCIATICA: ICD-10-CM

## 2022-08-09 DIAGNOSIS — R74.8 ELEVATED LIVER ENZYMES: ICD-10-CM

## 2022-08-09 DIAGNOSIS — K74.00 LIVER FIBROSIS: ICD-10-CM

## 2022-08-09 DIAGNOSIS — Z76.89 ENCOUNTER TO ESTABLISH CARE: Primary | ICD-10-CM

## 2022-08-09 DIAGNOSIS — K76.0 FATTY LIVER: ICD-10-CM

## 2022-08-09 DIAGNOSIS — G47.33 OSA (OBSTRUCTIVE SLEEP APNEA): ICD-10-CM

## 2022-08-09 PROBLEM — R73.03 PRE-DIABETES: Status: RESOLVED | Noted: 2017-04-18 | Resolved: 2022-08-09

## 2022-08-09 PROBLEM — E66.813 CLASS 3 SEVERE OBESITY DUE TO EXCESS CALORIES WITH SERIOUS COMORBIDITY AND BODY MASS INDEX (BMI) OF 40.0 TO 44.9 IN ADULT: Status: ACTIVE | Noted: 2017-09-19

## 2022-08-09 PROCEDURE — 3008F BODY MASS INDEX DOCD: CPT | Mod: CPTII,S$GLB,, | Performed by: INTERNAL MEDICINE

## 2022-08-09 PROCEDURE — 99999 PR PBB SHADOW E&M-EST. PATIENT-LVL IV: ICD-10-PCS | Mod: PBBFAC,,, | Performed by: INTERNAL MEDICINE

## 2022-08-09 PROCEDURE — 1159F MED LIST DOCD IN RCRD: CPT | Mod: CPTII,S$GLB,, | Performed by: INTERNAL MEDICINE

## 2022-08-09 PROCEDURE — 3075F SYST BP GE 130 - 139MM HG: CPT | Mod: CPTII,S$GLB,, | Performed by: INTERNAL MEDICINE

## 2022-08-09 PROCEDURE — 3078F PR MOST RECENT DIASTOLIC BLOOD PRESSURE < 80 MM HG: ICD-10-PCS | Mod: CPTII,S$GLB,, | Performed by: INTERNAL MEDICINE

## 2022-08-09 PROCEDURE — 1160F RVW MEDS BY RX/DR IN RCRD: CPT | Mod: CPTII,S$GLB,, | Performed by: INTERNAL MEDICINE

## 2022-08-09 PROCEDURE — 99396 PREV VISIT EST AGE 40-64: CPT | Mod: S$GLB,,, | Performed by: INTERNAL MEDICINE

## 2022-08-09 PROCEDURE — 3078F DIAST BP <80 MM HG: CPT | Mod: CPTII,S$GLB,, | Performed by: INTERNAL MEDICINE

## 2022-08-09 PROCEDURE — 99396 PR PREVENTIVE VISIT,EST,40-64: ICD-10-PCS | Mod: S$GLB,,, | Performed by: INTERNAL MEDICINE

## 2022-08-09 PROCEDURE — 99999 PR PBB SHADOW E&M-EST. PATIENT-LVL IV: CPT | Mod: PBBFAC,,, | Performed by: INTERNAL MEDICINE

## 2022-08-09 PROCEDURE — 1159F PR MEDICATION LIST DOCUMENTED IN MEDICAL RECORD: ICD-10-PCS | Mod: CPTII,S$GLB,, | Performed by: INTERNAL MEDICINE

## 2022-08-09 PROCEDURE — 3075F PR MOST RECENT SYSTOLIC BLOOD PRESS GE 130-139MM HG: ICD-10-PCS | Mod: CPTII,S$GLB,, | Performed by: INTERNAL MEDICINE

## 2022-08-09 PROCEDURE — 1160F PR REVIEW ALL MEDS BY PRESCRIBER/CLIN PHARMACIST DOCUMENTED: ICD-10-PCS | Mod: CPTII,S$GLB,, | Performed by: INTERNAL MEDICINE

## 2022-08-09 PROCEDURE — 3008F PR BODY MASS INDEX (BMI) DOCUMENTED: ICD-10-PCS | Mod: CPTII,S$GLB,, | Performed by: INTERNAL MEDICINE

## 2022-08-09 NOTE — LETTER
AUTHORIZATION FOR RELEASE OF   CONFIDENTIAL INFORMATION    Dear Dr. Medley,    We are seeing Zo Martínez, date of birth 1980, in the clinic at [facility]. Velvet Johansen MD is the patient's PCP. Zo Martínez has an outstanding lab/procedure at the time we reviewed her chart. In order to help keep her health information updated, she has authorized us to request the following medical record(s):        (  )  MAMMOGRAM                                      (  )  COLONOSCOPY      ( X )  PAP SMEAR                                          (  )  OUTSIDE LAB RESULTS     (  )  DEXA SCAN                                          (  )  EYE EXAM            (  )  FOOT EXAM                                          (  )  ENTIRE RECORD     (  )  OUTSIDE IMMUNIZATIONS                 (  )  _______________         Please fax records to [facility], Sarah Hotard Knight, MD, [phone]     If you have any questions, please contact Amaya Gonzalez MA at [phone].           Patient Name: Zo Martínez  : 1980  Patient Phone #: [phone]

## 2022-08-09 NOTE — PROGRESS NOTES
"Subjective:       Patient ID: Zo Martínez is a 42 y.o. female.    Chief Complaint: Establish Care      HPI:  Patient is new to clinic and presents to establish care. She has SKYE, tourette's syndrome, PACs/NSVT, ALBERTO, obesity, HANG. Labs from 7/12/22 completed but I can't see results today.    Anxiety: on lexapro and PRN Xanax. Sees psych who is managing her medications. No acute issues reported today.    PACs/NSVT: noted on prior monitor. Sees cardiology. Exercise stress echo was negative for ischemia. On metoprolol and sx are stable.     She is having some concerns about hypoglycemia. Sx started over last few months. She is noticing sweating and weakness. Usually after not eating. She takes glucose pills and her sx resolve.   She did labs 7/2022 but I can't see the results; will send them to me to review    HANG: was diagnosed 2019. She stopped using her machine, but also states it was one of the ones recalled. She is agreeable to trying again; did a titration study but working on finding results with her settings to re-order.     Chronic low back pain: she has had off an on back pain for a few years. Worsening over last few months. Worse with walking. Better with rest. Xray lumbar spine 7/2022 showed "disc spaces are maintained. No significant bony abnormalities."    She does have bioT pellets in placed. Has done 2 rounds of this. Reports she did not see improvement so not likely to do this again.     Tobacco use: never smoker  EtOH use: social  Illicit drugs use: denies      Past Medical History:   Diagnosis Date    Anxiety     Family history of breast cancer in mother 4/18/2017    Family history of colon cancer: father age 55 4/18/2017    Fatty liver 9/6/2019    Gestational diabetes mellitus     Obesity, Class I, BMI 30-34.9     Pre-diabetes 4/18/2017    Severe obesity (BMI 35.0-35.9 with comorbidity) 9/19/2017    Tourette's syndrome     x- have subsided    Transient elevated blood pressure  "       Family History   Problem Relation Age of Onset    Heart disease Mother         stenting    Diabetes Mother     Cancer Mother 63        breast, also vaginal    Hypertension Mother     Hyperlipidemia Mother     Heart disease Father         CHF    Hypertension Father     Cancer Father         colon    Diabetes Sister     Heart disease Sister         stents    Diabetes Brother     Heart disease Daughter         Tetralogy    Down syndrome Daughter     No Known Problems Son     Stroke Maternal Grandmother     Heart disease Maternal Grandfather     Heart disease Paternal Grandmother         MI    Diabetes Sister     Cirrhosis Neg Hx        Social History     Socioeconomic History    Marital status:    Tobacco Use    Smoking status: Never Smoker    Smokeless tobacco: Never Used   Substance and Sexual Activity    Alcohol use: Yes     Comment: rarely, socially, few times a year max     Drug use: No   Social History Narrative    Lives with her , daughter, and 2 cats. 4y/o daughter has Down's Syndrome and Tetralogy of Fallot.      Social Determinants of Health     Financial Resource Strain: Low Risk     Difficulty of Paying Living Expenses: Not hard at all   Food Insecurity: No Food Insecurity    Worried About Running Out of Food in the Last Year: Never true    Ran Out of Food in the Last Year: Never true   Transportation Needs: No Transportation Needs    Lack of Transportation (Medical): No    Lack of Transportation (Non-Medical): No   Physical Activity: Inactive    Days of Exercise per Week: 0 days    Minutes of Exercise per Session: 0 min   Stress: Stress Concern Present    Feeling of Stress : To some extent   Social Connections: Unknown    Frequency of Communication with Friends and Family: Once a week    Frequency of Social Gatherings with Friends and Family: Once a week    Active Member of Clubs or Organizations: Yes    Attends Club or Organization Meetings: 1 to 4  times per year    Marital Status:    Housing Stability: Low Risk     Unable to Pay for Housing in the Last Year: No    Number of Places Lived in the Last Year: 1    Unstable Housing in the Last Year: No       Review of Systems   Constitutional: Positive for fatigue. Negative for activity change, fever and unexpected weight change.   HENT: Negative for congestion, ear pain, hearing loss, rhinorrhea and sore throat.    Eyes: Negative for redness and visual disturbance.   Respiratory: Negative for cough, shortness of breath and wheezing.    Cardiovascular: Negative for chest pain, palpitations and leg swelling.   Gastrointestinal: Negative for abdominal pain, constipation, diarrhea, nausea and vomiting.   Genitourinary: Negative for dysuria, frequency and urgency.   Musculoskeletal: Negative for back pain, joint swelling and neck pain.   Skin: Negative for color change, rash and wound.   Neurological: Negative for dizziness, tremors, weakness, light-headedness and headaches.         Objective:      Physical Exam  Vitals reviewed.   Constitutional:       General: She is not in acute distress.     Appearance: She is well-developed. She is obese.   HENT:      Head: Normocephalic and atraumatic.      Right Ear: External ear normal.      Left Ear: External ear normal.      Nose: Nose normal.   Eyes:      General:         Right eye: No discharge.         Left eye: No discharge.      Extraocular Movements: Extraocular movements intact.      Conjunctiva/sclera: Conjunctivae normal.      Pupils: Pupils are equal, round, and reactive to light.   Neck:      Thyroid: No thyromegaly.   Cardiovascular:      Rate and Rhythm: Normal rate and regular rhythm.      Heart sounds: No murmur heard.  Pulmonary:      Effort: Pulmonary effort is normal. No respiratory distress.      Breath sounds: Normal breath sounds. No wheezing or rales.   Abdominal:      General: Bowel sounds are normal. There is no distension.      Palpations:  Abdomen is soft.      Tenderness: There is no abdominal tenderness.   Musculoskeletal:      Right lower leg: No edema.      Left lower leg: No edema.   Skin:     General: Skin is warm and dry.   Neurological:      Mental Status: She is alert and oriented to person, place, and time.      Cranial Nerves: No cranial nerve deficit.   Psychiatric:         Behavior: Behavior normal.         Thought Content: Thought content normal.         Assessment:       1. Encounter to establish care    2. Fatty liver: F3 S3 9/20 needs Hepatology follow up every 6 months    3. HANG (obstructive sleep apnea): mild per sleep study 10/19    4. Chronic bilateral low back pain without sciatica    5. Class 3 severe obesity due to excess calories with serious comorbidity and body mass index (BMI) of 40.0 to 44.9 in adult    6. Premature atrial contractions    7. Nonsustained ventricular tachycardia    8. Essential hypertension    9. SKYE (generalized anxiety disorder)    10. Recurrent major depressive disorder, in full remission    11. Healthcare maintenance        Plan:       Zo was seen today for establish care.    Diagnoses and all orders for this visit:    Encounter to establish care  meds reconciled  Problem list reviewed and updated  PMH, PSH, SH and FH reviewed and updated    Fatty liver: F3 S3 9/20 needs Hepatology follow up every 6 months  -     Ambulatory referral/consult to Hepatology; Future  She does report known fatty liver. Chart review shows this diagnosis that she had fibrosis  She has not had follow up in 2 years. Agreeable to scheduling and may need repeat fibroscan which I can not do here  She will bring her recent labs for me to review as well    HANG (obstructive sleep apnea): mild per sleep study 10/19  Chronic uncontrolled  Diagnosed 2019 but not wearing CPAP last 2 years; was part of recall  Discussed risks of untreated HANG and agreeable to trying again. Chart review shows messaging that she was recommended APAP  6-20cm so will trial this on new orders (it doesn't appear the titration study was ever done due to COVID outbreak and this was recommended by sleep med)    Chronic bilateral low back pain without sciatica  -     Ambulatory referral/consult to Physical/Occupational Therapy; Future  Chronic, worsening  Diet, exercise, weight loss  PRN Tylenol, NSAIDs  Start PT  Xray reports from July 2022 personally reviewed and discussed    Class 3 severe obesity due to excess calories with serious comorbidity and body mass index (BMI) of 40.0 to 44.9 in adult  Spent > 10 mins on diet and exercise counseling today  Briefly discussed Ozempic based on her questions  Can do formal obesity med visit in the future if she desire    Premature atrial contractions  Nonsustained ventricular tachycardia  Chronic stable  Cont metoprolol   Sees cardiology    Essential hypertension  Chronic stable  Diagnosis in chart--only on metoprolol per cardiology for palpitations  Continue medications at same dose  Low Na diet  Exercise, weight loss  Check BP and keep log for next visit    SKYE (generalized anxiety disorder)  Recurrent major depressive disorder, in full remission  Chronic stable  Cont meds per psych who is managing    Healthcare maintenance  Discussed prevnar 20, will think about it  PAP completed with Dr. Medley, requested records  MMG completed    RTC 1 year and PRN pending review of above labs

## 2022-08-09 NOTE — TELEPHONE ENCOUNTER
Thanks! Yes, she needs US and labs a few days before her visit and fibroscan same day as visit, orders in

## 2022-08-21 ENCOUNTER — PATIENT OUTREACH (OUTPATIENT)
Dept: ADMINISTRATIVE | Facility: HOSPITAL | Age: 42
End: 2022-08-21
Payer: COMMERCIAL

## 2022-08-23 ENCOUNTER — PROCEDURE VISIT (OUTPATIENT)
Dept: HEPATOLOGY | Facility: CLINIC | Age: 42
End: 2022-08-23
Payer: COMMERCIAL

## 2022-08-23 ENCOUNTER — OFFICE VISIT (OUTPATIENT)
Dept: HEPATOLOGY | Facility: CLINIC | Age: 42
End: 2022-08-23
Payer: COMMERCIAL

## 2022-08-23 ENCOUNTER — LAB VISIT (OUTPATIENT)
Dept: LAB | Facility: HOSPITAL | Age: 42
End: 2022-08-23
Payer: COMMERCIAL

## 2022-08-23 ENCOUNTER — PATIENT MESSAGE (OUTPATIENT)
Dept: HEPATOLOGY | Facility: CLINIC | Age: 42
End: 2022-08-23

## 2022-08-23 ENCOUNTER — TELEPHONE (OUTPATIENT)
Dept: HEPATOLOGY | Facility: CLINIC | Age: 42
End: 2022-08-23

## 2022-08-23 VITALS
HEART RATE: 70 BPM | HEIGHT: 64 IN | BODY MASS INDEX: 40.09 KG/M2 | WEIGHT: 234.81 LBS | SYSTOLIC BLOOD PRESSURE: 131 MMHG | TEMPERATURE: 97 F | OXYGEN SATURATION: 95 % | DIASTOLIC BLOOD PRESSURE: 85 MMHG | RESPIRATION RATE: 18 BRPM

## 2022-08-23 DIAGNOSIS — K76.0 FATTY LIVER: ICD-10-CM

## 2022-08-23 DIAGNOSIS — R73.03 PRE-DIABETES: ICD-10-CM

## 2022-08-23 DIAGNOSIS — Z23 NEED FOR HEPATITIS A AND B VACCINATION: ICD-10-CM

## 2022-08-23 DIAGNOSIS — E66.01 MORBID OBESITY WITH BMI OF 40.0-44.9, ADULT: ICD-10-CM

## 2022-08-23 DIAGNOSIS — F40.240 CLAUSTROPHOBIA: Primary | ICD-10-CM

## 2022-08-23 DIAGNOSIS — K76.0 FATTY LIVER: Primary | ICD-10-CM

## 2022-08-23 DIAGNOSIS — I10 ESSENTIAL HYPERTENSION: Chronic | ICD-10-CM

## 2022-08-23 DIAGNOSIS — K74.00 LIVER FIBROSIS: ICD-10-CM

## 2022-08-23 DIAGNOSIS — R74.8 ELEVATED LIVER ENZYMES: ICD-10-CM

## 2022-08-23 DIAGNOSIS — E66.01 SEVERE OBESITY (BMI 35.0-35.9 WITH COMORBIDITY): ICD-10-CM

## 2022-08-23 LAB
ESTIMATED AVG GLUCOSE: 126 MG/DL (ref 68–131)
HBA1C MFR BLD: 6 % (ref 4–5.6)

## 2022-08-23 PROCEDURE — 3008F PR BODY MASS INDEX (BMI) DOCUMENTED: ICD-10-PCS | Mod: CPTII,S$GLB,, | Performed by: NURSE PRACTITIONER

## 2022-08-23 PROCEDURE — 3044F PR MOST RECENT HEMOGLOBIN A1C LEVEL <7.0%: ICD-10-PCS | Mod: CPTII,S$GLB,, | Performed by: NURSE PRACTITIONER

## 2022-08-23 PROCEDURE — 99214 PR OFFICE/OUTPT VISIT, EST, LEVL IV, 30-39 MIN: ICD-10-PCS | Mod: S$GLB,,, | Performed by: NURSE PRACTITIONER

## 2022-08-23 PROCEDURE — 83036 HEMOGLOBIN GLYCOSYLATED A1C: CPT | Performed by: NURSE PRACTITIONER

## 2022-08-23 PROCEDURE — 3079F PR MOST RECENT DIASTOLIC BLOOD PRESSURE 80-89 MM HG: ICD-10-PCS | Mod: CPTII,S$GLB,, | Performed by: NURSE PRACTITIONER

## 2022-08-23 PROCEDURE — 99214 OFFICE O/P EST MOD 30 MIN: CPT | Mod: S$GLB,,, | Performed by: NURSE PRACTITIONER

## 2022-08-23 PROCEDURE — 36415 COLL VENOUS BLD VENIPUNCTURE: CPT | Performed by: NURSE PRACTITIONER

## 2022-08-23 PROCEDURE — 99999 PR PBB SHADOW E&M-EST. PATIENT-LVL V: ICD-10-PCS | Mod: PBBFAC,,, | Performed by: NURSE PRACTITIONER

## 2022-08-23 PROCEDURE — 91200 LIVER ELASTOGRAPHY: CPT | Mod: S$GLB,,, | Performed by: NURSE PRACTITIONER

## 2022-08-23 PROCEDURE — 3079F DIAST BP 80-89 MM HG: CPT | Mod: CPTII,S$GLB,, | Performed by: NURSE PRACTITIONER

## 2022-08-23 PROCEDURE — 3008F BODY MASS INDEX DOCD: CPT | Mod: CPTII,S$GLB,, | Performed by: NURSE PRACTITIONER

## 2022-08-23 PROCEDURE — 99999 PR PBB SHADOW E&M-EST. PATIENT-LVL V: CPT | Mod: PBBFAC,,, | Performed by: NURSE PRACTITIONER

## 2022-08-23 PROCEDURE — 3075F PR MOST RECENT SYSTOLIC BLOOD PRESS GE 130-139MM HG: ICD-10-PCS | Mod: CPTII,S$GLB,, | Performed by: NURSE PRACTITIONER

## 2022-08-23 PROCEDURE — 91200 FIBROSCAN (VIBRATION CONTROLLED TRANSIENT ELASTOGRAPHY): ICD-10-PCS | Mod: S$GLB,,, | Performed by: NURSE PRACTITIONER

## 2022-08-23 PROCEDURE — 3075F SYST BP GE 130 - 139MM HG: CPT | Mod: CPTII,S$GLB,, | Performed by: NURSE PRACTITIONER

## 2022-08-23 PROCEDURE — 3044F HG A1C LEVEL LT 7.0%: CPT | Mod: CPTII,S$GLB,, | Performed by: NURSE PRACTITIONER

## 2022-08-23 RX ORDER — ALPRAZOLAM 0.25 MG/1
0.25 TABLET ORAL ONCE
Qty: 2 TABLET | Refills: 0 | Status: SHIPPED | OUTPATIENT
Start: 2022-08-23 | End: 2022-08-23

## 2022-08-23 NOTE — PROCEDURES
FibroScan (Vibration Controlled Transient Elastography)    Date/Time: 8/23/2022 9:00 AM  Performed by: Paola Haskins NP  Authorized by: Paola Haskins NP     Diagnosis:  NAFLD    Probe:  M    Universal Protocol: Patient's identity, procedure and site were verified, confirmatory pause was performed.  Discussed procedure including risks and potential complications.  Questions answered.  Patient verbalizes understanding and wishes to proceed with VCTE.     Procedure: After providing explanations of the procedure, patient was placed in the supine position with right arm in maximum abduction to allow optimal exposure of right lateral abdomen.  Patient was briefly assessed, Testing was performed in the mid-axillary location, 50Hz Shear Wave pulses were applied and the resulting Shear Wave and Propagation Speed detected with a 3.5 MHz ultrasonic signal, using the FibroScan probe, Skin to liver capsule distance and liver parenchyma were accessed during the entire examination with the FibroScan probe, Patient was instructed to breathe normally and to abstain from sudden movements during the procedure, allowing for random measurements of liver stiffness. At least 10 Shear Waves were produced, Individual measurements of each Shear Wave were calculated.  Patient tolerated the procedure well with no complications.  Meets discharge criteria as was dismissed.  Rates pain 0 out of 10.  Patient will follow up with ordering provider to review results.      Findings  Median liver stiffness score:  9  CAP Reading: dB/m:  339    IQR/med %:  14  Interpretation  Fibrosis interpretation is based on medial liver stiffness - Kilopascal (kPa).    Fibrosis Stage:  F2  Steatosis interpretation is based on controlled attenuation parameter - (dB/m).    Steatosis Grade:  S3

## 2022-08-23 NOTE — PROGRESS NOTES
[facility] Established Patient Visit    Reason for Visit:  Fatty liver, liver fibrosis     PCP: Velvet Johansen    HPI:  This is a 42 y.o. female with PMH noted below, here for follow up of  fatty liver     Serological workup was negative for Marco's, alpha-1 antitrypsin deficiency, hemochromatosis, autoimmune etiology, and viral hepatitis A, B and C    Prior serologic workup:   Lab Results   Component Value Date    SMOOTHMUSCAB Negative 1:40 08/30/2019    AMAIFA Negative 1:40 08/30/2019    IGGSERUM 1363 08/30/2019    ANASCREEN Negative <1:160 08/30/2019    FERRITIN 87 08/30/2019    FESATURATED 10 (L) 08/30/2019    PETH Negative 09/06/2019    CERULOPLSM 35.0 08/30/2019    HEPBSAG Negative 08/16/2019    HEPCAB Negative 08/16/2019    HEPAIGM Negative 08/16/2019     Risk factors for NAFLD include obesity, HTN, pre-DM    Liver fibrosis staging:  -- fibroscan 9/2022 noted F3, S3 (kPA 10.2, )  -- FIbroscan 8/2022 noted F2, S3 (kPA 9, )    Interval HPI: Presents today alone. ~10 lb cumulative Weight gain since last visit. Fibroscan still F2/near F3, needs MRI  10 lb weight gain since 2020, current wt 234 lbs   Reports ~30 lb weight loss in 2021, but has since gained it back + 10 lbs     Labs done 8/2022 showed elevated transaminase levels (ALT > AST, increasing elevation since 8/2019, previously WNL/mild elevation in 0075-3713 )  Platelets and alk phos WNL  Synthetic liver functioning WNL    Lab Results   Component Value Date    ALT 51 (H) 08/12/2022    AST 39 (H) 08/12/2022    ALKPHOS 121 08/12/2022    BILITOT 0.4 08/12/2022    ALBUMIN 4.1 08/12/2022    INR 0.9 12/13/2019     10/12/2020     Abd U/S done 8/2022 showed hepatomegaly (18.5cm), fatty liver. No bile duct dilation. No splenomegaly    Denies family history of liver disease . Denies alcohol consumption currently or in the past    Immunity to Hep A and B - needs TwinRix, reminded pt, [facility]      PMHX:   "has a past medical history of Anxiety, Family history of breast cancer in mother (4/18/2017), Family history of colon cancer: father age 55 (4/18/2017), Fatty liver (9/6/2019), Gestational diabetes mellitus, Obesity, Class I, BMI 30-34.9, Pre-diabetes (4/18/2017), Severe obesity (BMI 35.0-35.9 with comorbidity) (9/19/2017), Tourette's syndrome, and Transient elevated blood pressure.    PSHX:  has a past surgical history that includes Cholecystectomy; Dilation and curettage of uterus; and Eye surgery (Bilateral, 11/2022).    The patient's social and family histories were reviewed by me and updated in the appropriate section of the electronic medical record.    Review of patient's allergies indicates:  No Known Allergies    Current Outpatient Medications on File Prior to Visit   Medication Sig Dispense Refill    ALPRAZolam (XANAX) 0.25 MG tablet Take 1 tablet (0.25 mg total) by mouth nightly as needed for Anxiety. 15 tablet 0    EScitalopram oxalate (LEXAPRO) 10 MG tablet Take 1 tablet (10 mg total) by mouth once daily. 90 tablet 3    metoprolol succinate (TOPROL-XL) 25 MG 24 hr tablet Take 1 tablet (25 mg total) by mouth once daily. 90 tablet 3     No current facility-administered medications on file prior to visit.       SOCIAL HISTORY:   Social History     Substance and Sexual Activity   Alcohol Use Yes    Comment: rarely, socially, few times a year max        Social History     Substance and Sexual Activity   Drug Use No       ROS:   GENERAL: Denies fatigue  CARDIOVASCULAR: Denies edema  GI: Denies abdominal pain  SKIN: Denies rash, itching   NEURO: Denies confusion, memory loss, or mood changes    Objective Findings:    PHYSICAL EXAM:   Friendly White female, in no acute distress; alert and oriented to person, place and time  VITALS: /85 (BP Location: Right arm, Patient Position: Sitting, BP Method: Large (Automatic))   Pulse 70   Temp 96.8 °F (36 °C) (Oral)   Resp 18   Ht 5' 4" (1.626 m)   Wt 106.5 " kg (234 lb 12.6 oz)   SpO2 95%   BMI 40.30 kg/m²   EYES: Sclerae anicteric  GI: Soft, non-tender, non-distended. No ascites.  EXTREMITIES:  No edema.  SKIN: Warm and dry. No jaundice. No telangectasias noted. No palmar erythema.  NEURO:  No asterixis.  PSYCH:  Thought and speech pattern appropriate. Behavior normal      EDUCATION:  See instructions discussed with patient in Instructions section of the After Visit Summary       ASSESSMENT & PLAN:  42 y.o. White female with:  1.  Fatty liver, likely related to metabolic risk factors   - see HPI  -- Immunity to Hep A and B : needs TwinRix, reminded pt, re sent to [facility] pharm and ID  -- Recommendations discussed with patient:  1. Limit alcohol consumption, none given possible scar tissue in the liver   2 Weight loss goal of 25 lbs  3. Low carb/sugar, high fiber and protein diet, limit your carb intake to LESS than 30-45 grams of carbs with a meal or LESS than 5-10 grams with any snack   4. Exercise, 5 days per week, 30 minutes per day, as tolerated  5. Recommend good cholesterol, blood pressure, blood sugar levels   6. Consider enrolling in ALBERTO fibrosis clinical trails - will determine based on liver fibrosis staging results     2. Liver fibrosis  -- F2/F3 on previous fibroscans, will obtain MRI to compare. Stressed importance of lifestyle changes and weight loss     3. Elevated liver enzymes  -- Serological workup was negative for Marco's, alpha-1 antitrypsin deficiency, hemochromatosis, autoimmune etiology, and viral hepatitis A, B and C    4. Obesity, HTN, preDM   -- Body mass index is 40.3 kg/m².   -- increases risk for fatty liver          Follow up in about 2 weeks (around 9/6/2022). with MRI elasto before  Orders Placed This Encounter   Procedures    MR Elastography    Hepatitis A / Hepatitis B Combined Vaccine (IM)    Hemoglobin A1C    Ambulatory referral/consult to Bariatric Medicine    Ambulatory referral/consult to Infectious Disease Injection Dept         Thank you for allowing me to participate in the care of Zo Martínez    SABINE Phelps    I spent a total of 30 minutes on the day of the visit.This includes face to face time and non-face to face time preparing to see the patient (eg, review of tests), obtaining and/or reviewing separately obtained history, documenting clinical information in the electronic or other health record, independently interpreting results and communicating results to the patient/family/caregiver, and coordinating care.       CC'ed note to:   Velvet Johansen MD

## 2022-08-23 NOTE — PATIENT INSTRUCTIONS
1. Fibroscan to look for fat or scar tissue in the liver showed >67% fat in the liver, stage 2-3 scar tissue. Need MRI for compare   2. Recommend vaccines for Hepatitis  A/B, see below   3. Follow up  after MRI  4. Referral to bariatric medicine for weight loss     There is no FDA approved therapy for fatty liver disease. Therefore, these things are important:  Limit alcohol consumption  2 Weight loss goal of 25 lbs, referral for [facility] if interested. Also, if interested in a dietician visit to create a weight loss plan, contact the dietician team at [facility] at [email or web address] to schedule a visit to you can call [facility] in [place]: [phone] and the  will transfer the call to one of the dieticians to schedule an appointment. Or you can also call [phone] to schedule. They do offer video visits   3. Low carb/sugar, high fiber and protein diet.Try to limit your carb intake to LESS than 30-45 grams of carbs with a meal or LESS than 5-10 grams with any snack (total of any snack foods eaten during that time). Use [employer] Pal Or Lose It josh to add up your carbs through the day. Do NOT drink any beverages with calories or carbs (this can lead to high blood sugar and weight gain). Also, some of our patients have been very successful with weight loss using the pre made/planned meal planning services that limit calories and portion size (one company in [place] is called [employer] but there are also many others out there. The main thing to look for is low calorie, high protein, low carb)  4. Exercise, 5 days per week, 30 minutes per day, as tolerated  5. Recommend good cholesterol, blood pressure, blood sugar levels   6. Consider enrolling in ALBERTO fibrosis clinical trails since your fibroscan suggested that you may have fibrosis/scarring in your liver related to fatty liver    In some people, fatty liver can progress to steatohepatitis (inflamatory fatty  liver) and possibly to cirrhosis, increasing the risk for liver cancer, liver failure, and death. Therefore, the lifestyle changes are very important to decrease this risk.     Website with information about fatty liver and inflammation related to fatty liver (ALBERTO) = www.nashtruth.[employer]  AND www.NASHactually.com      HEP A/B VACCINE  Your immunity markers show that you do NOT have immunity against Hepatitis A or B, so I recommend that you receive the combination Hepatitis A and B vaccine called TwinRix. This will protect your liver from these viruses, which can make your liver very sick.     Hep A can be transmitted through food and water and can cause significant liver injury. There was previously a significant Hepatitis A outbreak in [place]. Hep B vaccine is transmitted through blood or bodily fluids (there are no symptoms typically) and can develop longstanding (chronic) Hep B and there is no cure, so many people have it for life. Therefore, the vaccines provide immunity against these viruses that can cause harm to the liver.     The vaccine series is 3 vaccines: one now, one at 4 weeks and one 6 months after the 1st one. I sent the vaccine to the [facility] pharmacy. I recommend calling them in a couple of days to see if the vaccine is covered by your insurance and arrange the vaccines if they are covered. Their number is [phone]    If the vaccine is not covered at the pharmacy level, I sent an order that you can get the vaccine in the infectious disease department at [facility]. If that is the case, please call [phone]  to schedule your vaccine administration appointment in the infectious disease department.  If you need to proceed with vaccines with the infectious disease department, you can call to obtain a cost for these vaccines before you proceed, you can call the [facility] office at [phone] or [phone]

## 2022-08-23 NOTE — TELEPHONE ENCOUNTER
RX called in, as below to [facility] pharmacy on file. I spoke with Wesley, who took a verbal order. Patient updated via [facility] message.    ALPRAZolam (XANAX) 0.25 MG tablet 0.25 mg, Once          Summary: Take 1 tablet (0.25 mg total) by mouth once. for 1 dose, Starting Tue 8/23/2022, Phone In     Dose, Route, Frequency: 0.25 mg, Oral, Once    Start: 8/23/2022    End: 8/23/2022    Ord/Sold: 8/23/2022 (O)      Report    Long-term:       Pharmacy: [id number] - [place], [place] - [address] AT [facility] Dose History         Patient Sig: Take 1 tablet (0.25 mg total) by mouth once. for 1 dose       Ordered on: 8/23/2022       Authorized by: BERNABE HANSON       Dispense: 2 tablet       Refills: 0 ordered       Note to Pharmacy: Take 1 tablet by mouth 30-60 minutes prior to MRI. May repeat once as needed.           Hepatology Nurse Practitioner  [facility]

## 2022-08-23 NOTE — Clinical Note
Would you mind sending Xanax to her [facility] pharmacy in [place] to take before her MRI? Thanks !

## 2022-08-24 DIAGNOSIS — I10 ESSENTIAL HYPERTENSION: ICD-10-CM

## 2022-08-29 ENCOUNTER — TELEPHONE (OUTPATIENT)
Dept: BARIATRICS | Facility: CLINIC | Age: 42
End: 2022-08-29
Payer: COMMERCIAL

## 2022-08-29 NOTE — TELEPHONE ENCOUNTER
Notified patient of the date & time of financial phone call appt.  Pt aware appt is a telephone call.   Dashboard updated

## 2022-08-30 ENCOUNTER — PATIENT MESSAGE (OUTPATIENT)
Dept: INTERNAL MEDICINE | Facility: CLINIC | Age: 42
End: 2022-08-30
Payer: COMMERCIAL

## 2022-08-31 ENCOUNTER — PATIENT MESSAGE (OUTPATIENT)
Dept: INTERNAL MEDICINE | Facility: CLINIC | Age: 42
End: 2022-08-31
Payer: COMMERCIAL

## 2022-09-06 ENCOUNTER — TELEPHONE (OUTPATIENT)
Dept: BARIATRICS | Facility: CLINIC | Age: 42
End: 2022-09-06
Payer: COMMERCIAL

## 2022-09-19 ENCOUNTER — HOSPITAL ENCOUNTER (OUTPATIENT)
Dept: RADIOLOGY | Facility: HOSPITAL | Age: 42
Discharge: HOME | End: 2022-09-19
Attending: NURSE PRACTITIONER
Payer: COMMERCIAL

## 2022-09-19 DIAGNOSIS — E66.01 MORBID OBESITY WITH BMI OF 40.0-44.9, ADULT: ICD-10-CM

## 2022-09-19 DIAGNOSIS — K76.0 FATTY LIVER: ICD-10-CM

## 2022-09-19 DIAGNOSIS — K74.00 LIVER FIBROSIS: ICD-10-CM

## 2022-09-19 PROCEDURE — 76391 MR ELASTOGRAPHY: CPT | Mod: TC

## 2022-09-19 PROCEDURE — 76391 MR ELASTOGRAPHY: ICD-10-PCS | Mod: 26,,, | Performed by: RADIOLOGY

## 2022-09-19 PROCEDURE — 76391 MR ELASTOGRAPHY: CPT | Mod: 26,,, | Performed by: RADIOLOGY

## 2022-09-23 ENCOUNTER — OFFICE VISIT (OUTPATIENT)
Dept: HEPATOLOGY | Facility: CLINIC | Age: 42
End: 2022-09-23
Payer: COMMERCIAL

## 2022-09-23 VITALS
TEMPERATURE: 98 F | HEART RATE: 67 BPM | RESPIRATION RATE: 18 BRPM | OXYGEN SATURATION: 97 % | WEIGHT: 232.81 LBS | BODY MASS INDEX: 39.75 KG/M2 | SYSTOLIC BLOOD PRESSURE: 135 MMHG | DIASTOLIC BLOOD PRESSURE: 73 MMHG | HEIGHT: 64 IN

## 2022-09-23 DIAGNOSIS — K76.0 FATTY LIVER: ICD-10-CM

## 2022-09-23 DIAGNOSIS — K74.00 LIVER FIBROSIS: Primary | ICD-10-CM

## 2022-09-23 DIAGNOSIS — E66.01 MORBID OBESITY WITH BMI OF 40.0-44.9, ADULT: ICD-10-CM

## 2022-09-23 DIAGNOSIS — I10 ESSENTIAL HYPERTENSION: Chronic | ICD-10-CM

## 2022-09-23 DIAGNOSIS — R73.03 PRE-DIABETES: ICD-10-CM

## 2022-09-23 PROCEDURE — 1159F PR MEDICATION LIST DOCUMENTED IN MEDICAL RECORD: ICD-10-PCS | Mod: CPTII,S$GLB,, | Performed by: NURSE PRACTITIONER

## 2022-09-23 PROCEDURE — 99999 PR PBB SHADOW E&M-EST. PATIENT-LVL V: CPT | Mod: PBBFAC,,, | Performed by: NURSE PRACTITIONER

## 2022-09-23 PROCEDURE — 3075F SYST BP GE 130 - 139MM HG: CPT | Mod: CPTII,S$GLB,, | Performed by: NURSE PRACTITIONER

## 2022-09-23 PROCEDURE — 1159F MED LIST DOCD IN RCRD: CPT | Mod: CPTII,S$GLB,, | Performed by: NURSE PRACTITIONER

## 2022-09-23 PROCEDURE — 99999 PR PBB SHADOW E&M-EST. PATIENT-LVL V: ICD-10-PCS | Mod: PBBFAC,,, | Performed by: NURSE PRACTITIONER

## 2022-09-23 PROCEDURE — 1160F PR REVIEW ALL MEDS BY PRESCRIBER/CLIN PHARMACIST DOCUMENTED: ICD-10-PCS | Mod: CPTII,S$GLB,, | Performed by: NURSE PRACTITIONER

## 2022-09-23 PROCEDURE — 3078F DIAST BP <80 MM HG: CPT | Mod: CPTII,S$GLB,, | Performed by: NURSE PRACTITIONER

## 2022-09-23 PROCEDURE — 3008F BODY MASS INDEX DOCD: CPT | Mod: CPTII,S$GLB,, | Performed by: NURSE PRACTITIONER

## 2022-09-23 PROCEDURE — 3008F PR BODY MASS INDEX (BMI) DOCUMENTED: ICD-10-PCS | Mod: CPTII,S$GLB,, | Performed by: NURSE PRACTITIONER

## 2022-09-23 PROCEDURE — 3044F PR MOST RECENT HEMOGLOBIN A1C LEVEL <7.0%: ICD-10-PCS | Mod: CPTII,S$GLB,, | Performed by: NURSE PRACTITIONER

## 2022-09-23 PROCEDURE — 3044F HG A1C LEVEL LT 7.0%: CPT | Mod: CPTII,S$GLB,, | Performed by: NURSE PRACTITIONER

## 2022-09-23 PROCEDURE — 1160F RVW MEDS BY RX/DR IN RCRD: CPT | Mod: CPTII,S$GLB,, | Performed by: NURSE PRACTITIONER

## 2022-09-23 PROCEDURE — 99215 PR OFFICE/OUTPT VISIT, EST, LEVL V, 40-54 MIN: ICD-10-PCS | Mod: S$GLB,,, | Performed by: NURSE PRACTITIONER

## 2022-09-23 PROCEDURE — 3078F PR MOST RECENT DIASTOLIC BLOOD PRESSURE < 80 MM HG: ICD-10-PCS | Mod: CPTII,S$GLB,, | Performed by: NURSE PRACTITIONER

## 2022-09-23 PROCEDURE — 3075F PR MOST RECENT SYSTOLIC BLOOD PRESS GE 130-139MM HG: ICD-10-PCS | Mod: CPTII,S$GLB,, | Performed by: NURSE PRACTITIONER

## 2022-09-23 PROCEDURE — 99215 OFFICE O/P EST HI 40 MIN: CPT | Mod: S$GLB,,, | Performed by: NURSE PRACTITIONER

## 2022-09-23 NOTE — PROGRESS NOTES
[facility] Established Patient Visit    Reason for Visit:  Fatty liver, liver fibrosis/ cirrhosis (?)    PCP: Velvet Johansen    HPI:  This is a 42 y.o. female with PMH noted below, here for follow up of  fatty liver and liver fibrosis     Serological workup was negative for Marco's, alpha-1 antitrypsin deficiency, hemochromatosis, autoimmune etiology, and viral hepatitis A, B and C    Prior serologic workup:   Lab Results   Component Value Date    SMOOTHMUSCAB Negative 1:40 08/30/2019    AMAIFA Negative 1:40 08/30/2019    IGGSERUM 1363 08/30/2019    ANASCREEN Negative <1:160 08/30/2019    FERRITIN 87 08/30/2019    FESATURATED 10 (L) 08/30/2019    PETH Negative 09/06/2019    CERULOPLSM 35.0 08/30/2019    HEPBSAG Negative 08/16/2019    HEPCAB Negative 08/16/2019    HEPAIGM Negative 08/16/2019     Risk factors for NAFLD include obesity, HTN, pre-DM    Liver fibrosis staging:  -- fibroscan 9/2021 noted F3, S3 (kPA 10.2, )  -- FIbroscan 8/2022 noted F2, S3 (kPA 9, )  -- MRI elasto 9/2022 noted F3-F4, severe steatosis     Interval HPI: Presents today alone. ~10 lb cumulative Weight gain since last visit. FibroscanF2/near F3, and MRI elasto F3-F4 so needs liver biopsy   10 lb weight gain since 2020, current wt 232 lbs   Recently started Semalutide with a local weight loss compound pharmacy   Reports ~30 lb weight loss in 2021, but has since gained it back + 10 lbs     Labs done 8/2022 showed elevated transaminase levels (ALT > AST, increasing elevation since 8/2019, previously WNL/mild elevation in [id number] )  Platelets and alk phos WNL  Synthetic liver functioning WNL    Lab Results   Component Value Date    ALT 51 (H) 08/12/2022    AST 39 (H) 08/12/2022    ALKPHOS 121 08/12/2022    BILITOT 0.4 08/12/2022    ALBUMIN 4.1 08/12/2022    INR 0.9 12/13/2019     10/12/2020     Abd U/S done 8/2022 showed hepatomegaly (18.5cm), fatty liver. No bile duct dilation. No  splenomegaly    Denies family history of liver disease . Denies alcohol consumption currently or in the past    Immunity to Hep A and B - needs TwinRix, reminded pt, [facility]      PMHX:  has a past medical history of Anxiety, Family history of breast cancer in mother (4/18/2017), Family history of colon cancer: father age 55 (4/18/2017), Fatty liver (9/6/2019), Gestational diabetes mellitus, Obesity, Class I, BMI 30-34.9, Pre-diabetes (4/18/2017), Severe obesity (BMI 35.0-35.9 with comorbidity) (9/19/2017), Tourette's syndrome, and Transient elevated blood pressure.    PSHX:  has a past surgical history that includes Cholecystectomy; Dilation and curettage of uterus; and Eye surgery (Bilateral, 11/2022).    The patient's social and family histories were reviewed by me and updated in the appropriate section of the electronic medical record.    Review of patient's allergies indicates:  No Known Allergies    Current Outpatient Medications on File Prior to Visit   Medication Sig Dispense Refill    ALPRAZolam (XANAX) 0.25 MG tablet Take 1 tablet (0.25 mg total) by mouth nightly as needed for Anxiety. 15 tablet 0    EScitalopram oxalate (LEXAPRO) 10 MG tablet Take 1 tablet (10 mg total) by mouth once daily. 90 tablet 3    metoprolol succinate (TOPROL-XL) 25 MG 24 hr tablet Take 1 tablet (25 mg total) by mouth once daily. 90 tablet 3     No current facility-administered medications on file prior to visit.       SOCIAL HISTORY:   Social History     Substance and Sexual Activity   Alcohol Use Yes    Comment: rarely, socially, few times a year max        Social History     Substance and Sexual Activity   Drug Use No       ROS:   GENERAL: Denies fatigue  CARDIOVASCULAR: Denies edema  GI: Denies abdominal pain  SKIN: Denies rash, itching   NEURO: Denies confusion, memory loss, or mood changes    Objective Findings:    PHYSICAL EXAM:   Friendly White female, in no acute distress; alert and oriented to person,  "place and time  VITALS: /73 (BP Location: Right arm, Patient Position: Sitting, BP Method: Large (Automatic))   Pulse 67   Temp 98.4 °F (36.9 °C) (Oral)   Resp 18   Ht 5' 4" (1.626 m)   Wt 105.6 kg (232 lb 12.9 oz)   SpO2 97%   BMI 39.96 kg/m²   EYES: Sclerae anicteric  GI: Soft, non-tender, non-distended. No ascites.  EXTREMITIES:  No edema.  SKIN: Warm and dry. No jaundice. No telangectasias noted. No palmar erythema.  NEURO:  No asterixis.  PSYCH:  Thought and speech pattern appropriate. Behavior normal      EDUCATION:  See instructions discussed with patient in Instructions section of the After Visit Summary     ASSESSMENT & PLAN:  42 y.o. White female with:  1.  Liver fibrosis/ cirrhosis (?)  -- advanced fibrosis on multiple staging tests, needs further investigation with liver biopsy  Discussed liver biopsy procedure and possible complications associated with liver biopsy including pain, bleeding, infection, and organ perforation. Reviewed the role of the procedure including confirming of diagnosis and staging of liver disease so pt can be appropriately followed from this point forward.    Instructed pt to not take any aspirin, NSAIDS (including advil, aleve, ibuprofen, motrin, naproxen) and fish oil for 7 days before and after biopsy      2. Fatty liver, likely related to metabolic risk factors   - see HPI  -- Immunity to Hep A and B : needs TwinRix, reminded pt, [facility]  -- Recommendations discussed with patient:  Limit alcohol consumption, none given possible scar tissue in the liver   2 Weight loss goal of 25-40 lbs  3. Low carb/sugar, high fiber and protein diet, limit your carb intake to LESS than 30-45 grams of carbs with a meal or LESS than 5-10 grams with any snack   4. Exercise, 5 days per week, 30 minutes per day, as tolerated  5. Recommend good cholesterol, blood pressure, blood sugar levels   6. Consider enrolling in ALBERTO fibrosis clinical trails - discussed " potential to screen for clinical trials for potential enrollment and liver biopsy. Pt wishes to do biopsy before deciding on clinical trials     3. Elevated liver enzymes  -- likely related to fatty liver   -- Serological workup was negative for Marco's, alpha-1 antitrypsin deficiency, hemochromatosis, autoimmune etiology, and viral hepatitis A, B and C    4. Obesity, HTN, preDM   -- Body mass index is 39.96 kg/m².   -- increases risk for fatty liver          Follow up in about 2 weeks (around 10/7/2022) for after liver biopsy completed.   Orders Placed This Encounter   Procedures    IR Biopsy Liver    Ambulatory referral/consult  to [facility] Interventional RAD          Thank you for allowing me to participate in the care of SABINE Trevino    I spent a total of 40 minutes on the day of the visit.This includes face to face time and non-face to face time preparing to see the patient (eg, review of tests), obtaining and/or reviewing separately obtained history, documenting clinical information in the electronic or other health record, independently interpreting results and communicating results to the patient/family/caregiver, and coordinating care.       CC'ed note to:   Velvet Johansen MD

## 2022-09-23 NOTE — PATIENT INSTRUCTIONS
LIVER BIOPSY  In a nutshell, a liver biopsy is a same day procedure. Someone needs to bring you, stay with you, and bring you home because they give you medication to make you sleepy for the procedure. They give you sedation to make you sleepy but do not put you fully to sleep. They numb the right upper part of the abdomen where the liver is and pass a thin needle through the skin into the liver to obtain a piece of liver tissue that can be evaluated under a microscope by a pathologist.     They use an ultrasound to guide doing the biopsy. Possible complications associated with liver biopsy include pain, bleeding, infection, and organ perforation - although not common and risk is low.     They keep you for 4 hours after the biopsy to assure that you are stable to return home. It is a same day procedure.     I am recommending the biopsy to confirm the diagnosis and staging of liver disease so pt can be appropriately followed from this point forward.    Do NOT take any aspirin, NSAIDS (including advil, aleve, ibuprofen, motrin, naproxen) and fish oil for 7 days before and after biopsy      FATTY LIVER  There is no FDA approved therapy for fatty liver disease. Therefore, these things are important:  Limit alcohol consumption  2 Weight loss goal of 40 lbs, referral for [facility] if interested. Also, if interested in a dietician visit to create a weight loss plan, contact the dietician team at [facility] at [email or web address] to schedule a visit to you can call [facility] in [place]: [phone] and the  will transfer the call to one of the dieticians to schedule an appointment. Or you can also call [phone] to schedule. They do offer video visits   3. Low carb/sugar, high fiber and protein diet.Try to limit your carb intake to LESS than 30-45 grams of carbs with a meal or LESS than 5-10 grams with any snack (total of any snack foods eaten during that time). Use  [employer] Pal or Lose It josh to add up your carbs through the day. Do NOT drink any beverages with calories or carbs (this can lead to high blood sugar and weight gain). Also, some of our patients have been very successful with weight loss using the pre made/planned meal planning services that limit calories and portion size (one company in [place] is called [employer] but there are also many others out there. The main thing to look for is low calorie, high protein, low carb)  4. Exercise, 5 days per week, 30 minutes per day, as tolerated  5. Recommend good cholesterol, blood pressure, blood sugar levels   6. Consider enrolling in ALBERTO fibrosis clinical trails since your fibroscan suggested that you may have fibrosis/scarring in your liver related to fatty liver      In some people, fatty liver can progress to steatohepatitis (inflamatory fatty liver) and possibly to cirrhosis, increasing the risk for liver cancer, liver failure, and death. Therefore, the lifestyle changes are very important to decrease this risk.     Website with information about fatty liver and inflammation related to fatty liver (ALBERTO) = www.nashtruth.com  AND www.NASHactually.com

## 2022-09-26 ENCOUNTER — TELEPHONE (OUTPATIENT)
Dept: INTERVENTIONAL RADIOLOGY/VASCULAR | Facility: CLINIC | Age: 42
End: 2022-09-26
Payer: COMMERCIAL

## 2022-09-26 NOTE — TELEPHONE ENCOUNTER
Spoke to pt on phone, Offered patient date of 10/12/2022, patient declined, needs later time. Offered patient date of 10/13/2022, patient accepted. Pt is scheduled on 10/13/2022  for IR procedure.  Preop instructions given, pt verbally understood. Pt aware and confirmed, Thanks

## 2022-09-27 ENCOUNTER — PATIENT MESSAGE (OUTPATIENT)
Dept: HEPATOLOGY | Facility: CLINIC | Age: 42
End: 2022-09-27
Payer: COMMERCIAL

## 2022-09-28 ENCOUNTER — PATIENT MESSAGE (OUTPATIENT)
Dept: HEPATOLOGY | Facility: CLINIC | Age: 42
End: 2022-09-28
Payer: COMMERCIAL

## 2022-09-30 ENCOUNTER — TELEPHONE (OUTPATIENT)
Dept: HEPATOLOGY | Facility: CLINIC | Age: 42
End: 2022-09-30
Payer: COMMERCIAL

## 2022-09-30 DIAGNOSIS — E66.01 MORBID OBESITY WITH BMI OF 40.0-44.9, ADULT: ICD-10-CM

## 2022-09-30 DIAGNOSIS — K76.0 FATTY LIVER: Primary | ICD-10-CM

## 2022-09-30 DIAGNOSIS — R74.8 ELEVATED LIVER ENZYMES: ICD-10-CM

## 2022-09-30 NOTE — TELEPHONE ENCOUNTER
Please contact pt and schedule f/u with me in 6 months with lab and MRI elasto a few days before    Thanks !

## 2023-02-07 ENCOUNTER — OFFICE VISIT (OUTPATIENT)
Dept: INTERNAL MEDICINE | Facility: CLINIC | Age: 43
End: 2023-02-07
Payer: COMMERCIAL

## 2023-02-07 VITALS
RESPIRATION RATE: 16 BRPM | HEART RATE: 113 BPM | DIASTOLIC BLOOD PRESSURE: 90 MMHG | HEIGHT: 64 IN | WEIGHT: 198 LBS | OXYGEN SATURATION: 98 % | BODY MASS INDEX: 33.8 KG/M2 | SYSTOLIC BLOOD PRESSURE: 122 MMHG

## 2023-02-07 DIAGNOSIS — R10.32 COLICKY LLQ ABDOMINAL PAIN: Primary | ICD-10-CM

## 2023-02-07 PROCEDURE — 3008F BODY MASS INDEX DOCD: CPT | Mod: CPTII,S$GLB,, | Performed by: INTERNAL MEDICINE

## 2023-02-07 PROCEDURE — 1159F MED LIST DOCD IN RCRD: CPT | Mod: CPTII,S$GLB,, | Performed by: INTERNAL MEDICINE

## 2023-02-07 PROCEDURE — 3080F PR MOST RECENT DIASTOLIC BLOOD PRESSURE >= 90 MM HG: ICD-10-PCS | Mod: CPTII,S$GLB,, | Performed by: INTERNAL MEDICINE

## 2023-02-07 PROCEDURE — 3074F PR MOST RECENT SYSTOLIC BLOOD PRESSURE < 130 MM HG: ICD-10-PCS | Mod: CPTII,S$GLB,, | Performed by: INTERNAL MEDICINE

## 2023-02-07 PROCEDURE — 3008F PR BODY MASS INDEX (BMI) DOCUMENTED: ICD-10-PCS | Mod: CPTII,S$GLB,, | Performed by: INTERNAL MEDICINE

## 2023-02-07 PROCEDURE — 1159F PR MEDICATION LIST DOCUMENTED IN MEDICAL RECORD: ICD-10-PCS | Mod: CPTII,S$GLB,, | Performed by: INTERNAL MEDICINE

## 2023-02-07 PROCEDURE — 3080F DIAST BP >= 90 MM HG: CPT | Mod: CPTII,S$GLB,, | Performed by: INTERNAL MEDICINE

## 2023-02-07 PROCEDURE — 1160F PR REVIEW ALL MEDS BY PRESCRIBER/CLIN PHARMACIST DOCUMENTED: ICD-10-PCS | Mod: CPTII,S$GLB,, | Performed by: INTERNAL MEDICINE

## 2023-02-07 PROCEDURE — 99213 OFFICE O/P EST LOW 20 MIN: CPT | Mod: S$GLB,,, | Performed by: INTERNAL MEDICINE

## 2023-02-07 PROCEDURE — 1160F RVW MEDS BY RX/DR IN RCRD: CPT | Mod: CPTII,S$GLB,, | Performed by: INTERNAL MEDICINE

## 2023-02-07 PROCEDURE — 3074F SYST BP LT 130 MM HG: CPT | Mod: CPTII,S$GLB,, | Performed by: INTERNAL MEDICINE

## 2023-02-07 PROCEDURE — 99999 PR PBB SHADOW E&M-EST. PATIENT-LVL III: ICD-10-PCS | Mod: PBBFAC,,, | Performed by: INTERNAL MEDICINE

## 2023-02-07 PROCEDURE — 99999 PR PBB SHADOW E&M-EST. PATIENT-LVL III: CPT | Mod: PBBFAC,,, | Performed by: INTERNAL MEDICINE

## 2023-02-07 PROCEDURE — 99213 PR OFFICE/OUTPT VISIT, EST, LEVL III, 20-29 MIN: ICD-10-PCS | Mod: S$GLB,,, | Performed by: INTERNAL MEDICINE

## 2023-02-07 RX ORDER — KETOROLAC TROMETHAMINE 10 MG/1
10 TABLET, FILM COATED ORAL EVERY 8 HOURS PRN
Qty: 15 TABLET | Refills: 0 | Status: SHIPPED | OUTPATIENT
Start: 2023-02-07 | End: 2023-02-12

## 2023-02-07 NOTE — PROGRESS NOTES
Subjective:       Patient ID: Zo Martínez is a 42 y.o. female.    Chief Complaint: Abdominal Pain (Cramping sensation. Patient stated that she is at the end of her menstrual cycle. Patient reports pain more off to the left hand side like when she had diverticulitis.)      HPI:  Patient is known to me and presents with abdominal pain. Sx started 2 days ago. She is on day 6 of menstrual cycle. Initial thought cramping due to her cycle and tried heating pad and Tyelnol without relief. Initially across lower abdomen and now localized to left lower quadrant. She reports h/o diverticulitis but this doesn't feel quite the same. No diarrhea, constipation. NO BRBPR. No nausea or vomiting. No fevers.       Past Medical History:   Diagnosis Date    Anxiety     Family history of breast cancer in mother 4/18/2017    Family history of colon cancer: father age 55 4/18/2017    Fatty liver 9/6/2019    Gestational diabetes mellitus     Obesity, Class I, BMI 30-34.9     Pre-diabetes 4/18/2017    Severe obesity (BMI 35.0-35.9 with comorbidity) 9/19/2017    Tourette's syndrome     x- have subsided    Transient elevated blood pressure        Family History   Problem Relation Age of Onset    Heart disease Mother         stenting    Diabetes Mother     Cancer Mother 63        breast, also vaginal    Hypertension Mother     Hyperlipidemia Mother     Heart disease Father         CHF    Hypertension Father     Cancer Father         colon    Diabetes Sister     Heart disease Sister         stents    Diabetes Sister     Diabetes Brother     Stroke Maternal Grandmother     Heart disease Maternal Grandfather     Heart disease Paternal Grandmother         MI    Heart disease Daughter         Tetralogy    Down syndrome Daughter     No Known Problems Son     Liver cancer Maternal Uncle     Cirrhosis Neg Hx        Social History     Socioeconomic History    Marital status:    Tobacco Use    Smoking status: Never    Smokeless tobacco:  Never   Substance and Sexual Activity    Alcohol use: Yes     Comment: rarely, socially, few times a year max     Drug use: No   Social History Narrative    Lives with her , daughter, and 2 cats. 4y/o daughter has Down's Syndrome and Tetralogy of Fallot.      Social Determinants of Health     Financial Resource Strain: Low Risk     Difficulty of Paying Living Expenses: Not hard at all   Food Insecurity: No Food Insecurity    Worried About Running Out of Food in the Last Year: Never true    Ran Out of Food in the Last Year: Never true   Transportation Needs: No Transportation Needs    Lack of Transportation (Medical): No    Lack of Transportation (Non-Medical): No   Physical Activity: Inactive    Days of Exercise per Week: 0 days    Minutes of Exercise per Session: 0 min   Stress: Stress Concern Present    Feeling of Stress : To some extent   Social Connections: Unknown    Frequency of Communication with Friends and Family: Once a week    Frequency of Social Gatherings with Friends and Family: Once a week    Active Member of Clubs or Organizations: Yes    Attends Club or Organization Meetings: 1 to 4 times per year    Marital Status:    Housing Stability: Low Risk     Unable to Pay for Housing in the Last Year: No    Number of Places Lived in the Last Year: 1    Unstable Housing in the Last Year: No       Review of Systems   Constitutional:  Negative for activity change, fatigue, fever and unexpected weight change.   HENT:  Negative for congestion, ear pain, hearing loss, rhinorrhea and sore throat.    Eyes:  Negative for pain, redness and visual disturbance.   Respiratory:  Negative for cough, shortness of breath and wheezing.    Cardiovascular:  Negative for chest pain, palpitations and leg swelling.   Gastrointestinal:  Positive for abdominal pain. Negative for blood in stool, constipation, diarrhea, nausea and vomiting.   Genitourinary:  Negative for dysuria, frequency, pelvic pain and urgency.    Musculoskeletal:  Negative for back pain, joint swelling and neck pain.   Skin:  Negative for color change, rash and wound.   Neurological:  Negative for dizziness, tremors, weakness, light-headedness and headaches.       Objective:      Physical Exam  Vitals reviewed.   Constitutional:       General: She is not in acute distress.     Appearance: She is well-developed.   HENT:      Head: Normocephalic and atraumatic.      Right Ear: External ear normal.      Left Ear: External ear normal.      Nose: Nose normal.   Eyes:      General:         Right eye: No discharge.         Left eye: No discharge.      Extraocular Movements: Extraocular movements intact.      Conjunctiva/sclera: Conjunctivae normal.      Pupils: Pupils are equal, round, and reactive to light.   Neck:      Thyroid: No thyromegaly.   Cardiovascular:      Rate and Rhythm: Normal rate and regular rhythm.      Heart sounds: No murmur heard.  Pulmonary:      Effort: Pulmonary effort is normal. No respiratory distress.      Breath sounds: Normal breath sounds. No wheezing.   Abdominal:      General: Bowel sounds are normal. There is no distension.      Palpations: Abdomen is soft.      Tenderness: There is abdominal tenderness (very mild LLQ, lower into pelvis however).   Skin:     General: Skin is warm and dry.   Neurological:      Mental Status: She is alert and oriented to person, place, and time.      Cranial Nerves: No cranial nerve deficit.   Psychiatric:         Behavior: Behavior normal.         Thought Content: Thought content normal.       Assessment:       1. Colicky LLQ abdominal pain        Plan:       1. Colicky LLQ abdominal pain  Difficult diagnosis  Clinically very very mild tenderness and exam not very consistent with diverticulitis (no fevers, diarrhea, etc)  Could be more cramping from menstruation.  Reports had recent US with GYN which was normal  Toradol short course for pain control and closely monitor  If progressing pain, fevers,  diarrhea, etc call me for antibx.   She is in agreement with plan  -     ketorolac (TORADOL) 10 mg tablet; Take 1 tablet (10 mg total) by mouth every 8 (eight) hours as needed for Pain.  Dispense: 15 tablet; Refill: 0

## 2023-02-27 ENCOUNTER — OFFICE VISIT (OUTPATIENT)
Dept: PSYCHIATRY | Facility: CLINIC | Age: 43
End: 2023-02-27
Payer: COMMERCIAL

## 2023-02-27 ENCOUNTER — PATIENT MESSAGE (OUTPATIENT)
Dept: ADMINISTRATIVE | Facility: HOSPITAL | Age: 43
End: 2023-02-27
Payer: COMMERCIAL

## 2023-02-27 DIAGNOSIS — F40.243 ANXIETY WITH FLYING: ICD-10-CM

## 2023-02-27 DIAGNOSIS — Z12.11 SCREENING FOR COLON CANCER: ICD-10-CM

## 2023-02-27 DIAGNOSIS — F41.1 GAD (GENERALIZED ANXIETY DISORDER): ICD-10-CM

## 2023-02-27 DIAGNOSIS — F33.42 RECURRENT MAJOR DEPRESSIVE DISORDER, IN FULL REMISSION: ICD-10-CM

## 2023-02-27 DIAGNOSIS — F42.8 OBSESSIVE THINKING: ICD-10-CM

## 2023-02-27 DIAGNOSIS — F95.2 TOURETTE'S SYNDROME: ICD-10-CM

## 2023-02-27 DIAGNOSIS — F51.05 INSOMNIA DUE TO MENTAL DISORDER: Primary | ICD-10-CM

## 2023-02-27 PROCEDURE — 99214 PR OFFICE/OUTPT VISIT, EST, LEVL IV, 30-39 MIN: ICD-10-PCS | Mod: 95,,, | Performed by: INTERNAL MEDICINE

## 2023-02-27 PROCEDURE — 1159F PR MEDICATION LIST DOCUMENTED IN MEDICAL RECORD: ICD-10-PCS | Mod: CPTII,95,, | Performed by: INTERNAL MEDICINE

## 2023-02-27 PROCEDURE — 99214 OFFICE O/P EST MOD 30 MIN: CPT | Mod: 95,,, | Performed by: INTERNAL MEDICINE

## 2023-02-27 PROCEDURE — 1160F PR REVIEW ALL MEDS BY PRESCRIBER/CLIN PHARMACIST DOCUMENTED: ICD-10-PCS | Mod: CPTII,95,, | Performed by: INTERNAL MEDICINE

## 2023-02-27 PROCEDURE — 1160F RVW MEDS BY RX/DR IN RCRD: CPT | Mod: CPTII,95,, | Performed by: INTERNAL MEDICINE

## 2023-02-27 PROCEDURE — 1159F MED LIST DOCD IN RCRD: CPT | Mod: CPTII,95,, | Performed by: INTERNAL MEDICINE

## 2023-02-27 RX ORDER — TRAZODONE HYDROCHLORIDE 50 MG/1
50-100 TABLET ORAL NIGHTLY PRN
Qty: 60 TABLET | Refills: 3 | Status: SHIPPED | OUTPATIENT
Start: 2023-02-27

## 2023-02-27 RX ORDER — ALPRAZOLAM 0.25 MG/1
0.25 TABLET ORAL NIGHTLY PRN
Qty: 15 TABLET | Refills: 0 | Status: SHIPPED | OUTPATIENT
Start: 2023-02-27 | End: 2023-05-11 | Stop reason: SDUPTHER

## 2023-02-27 RX ORDER — ESCITALOPRAM OXALATE 10 MG/1
10 TABLET ORAL DAILY
Qty: 90 TABLET | Refills: 3 | Status: SHIPPED | OUTPATIENT
Start: 2023-02-27 | End: 2023-10-02 | Stop reason: SDUPTHER

## 2023-02-27 NOTE — PROGRESS NOTES
OUTPATIENT PSYCHIATRY RETURN VISIT    ENCOUNTER DATE:  2/27/2023  SITE:  [facility]  LENGTH OF SESSION:  20 minutes    The patient location is:  [place], not in healthcare facility  Visit type:  audiovisual    Face to Face time with patient:  20 minutes  25 minutes of total time spent on the encounter, which includes face to face time and non-face to face time preparing to see the patient (eg, review of tests), Obtaining and/or reviewing separately obtained history, Documenting clinical information in the electronic or other health record, Independently interpreting results (not separately reported) and communicating results to the patient/family/caregiver, or Care coordination (not separately reported).     Each patient to whom he or she provides medical services by telemedicine is:  (1) informed of the relationship between the physician and patient and the respective role of any other health care provider with respect to management of the patient; and (2) notified that he or she may decline to receive medical services by telemedicine and may withdraw from such care at any time.    CHIEF COMPLAINT:  Anxiety and Insomnia      HISTORY OF PRESENTING ILLNESS:  Zo Martínez is a 43 y.o. female with history of Depression, Anxiety, Insomnia, and Tourette's who presents for follow up appointment.     Plan at last appointment on 1/12/2022:  Symptoms well controlled.  Continue Lexapro 10mg daily.  Discussed with patient informed consent, risks versus benefits, alternative treatments, side effect profile and the inherent unpredictability of individual responses to these treatments.  The patient expresses understanding of the above and displays the capacity to agree with this current plan.    History as told by patient:  Anxiety started when she started Ozempic.  Has been having anxiety in the night since then.  Wakes up with anxious feeling and then starts worrying.  Doesn't have anxiety at  all during the day.  Had evaluation for fatty liver.  Needs to lose another 8 fat pounds to be at goal.  Has already lost 40 pounds on Ozempic - started last fall.  Has found herself snappy sometimes - ObGyn says it could be hormonal.  For the last few months has been on birth control for fibroids - she thinks the irritability is from that but night time anxiety started before that.  Has not been taking Xanax except when she flies.  Weekly waking up in the night . Wakes up and feels like she is dying - breathing is labored.  Has trouble sleeping on other nights too.  Denies symptoms of depression.      Medication side effects:  As above  Medication compliance:  Yes    PSYCHIATRIC REVIEW OF SYSTEMS:  Trouble with sleep:  Yes as above  Appetite changes:  Decreased on Ozempic  Weight changes:  Loss as above  Lack of energy:  Denies  Anhedonia:  Denies  Somatic symptoms:  Denies  Libido:  Not discussed  Anxiety/panic:  Yes as above  Guilty/hopeless:  Denies  Self-injurious behavior/risky behavior:  Denies  Any drugs:  Denies  Alcohol:  Denies    MEDICAL REVIEW OF SYSTEMS:  Complete review of systems performed covering Constitutional, Musculoskeletal, Neurologic.  All systems negative except for that covered in HPI.    PAST PSYCHIATRIC, MEDICAL, AND SOCIAL HISTORY REVIEWED  The patient's past medical, family and social history have been reviewed and updated as appropriate within the electronic medical record - see encounter notes.    MEDICATIONS:    Current Outpatient Medications:     ALPRAZolam (XANAX) 0.25 MG tablet, Take 1 tablet (0.25 mg total) by mouth nightly as needed for Anxiety., Disp: 15 tablet, Rfl: 0    EScitalopram oxalate (LEXAPRO) 10 MG tablet, Take 1 tablet (10 mg total) by mouth once daily., Disp: 90 tablet, Rfl: 3    metoprolol succinate (TOPROL-XL) 25 MG 24 hr tablet, Take 1 tablet (25 mg total) by mouth once daily., Disp: 90 tablet, Rfl: 3    semaglutide (OZEMPIC) 0.25 mg or 0.5 mg(2 mg/1.5 mL) pen  "injector, Inject 0.25 mg into the skin every 7 days., Disp: , Rfl:     traZODone (DESYREL) 50 MG tablet, Take 1-2 tablets ( mg total) by mouth nightly as needed for Insomnia., Disp: 60 tablet, Rfl: 3    ALLERGIES:  Review of patient's allergies indicates:  No Known Allergies    PSYCHIATRIC EXAM:  There were no vitals filed for this visit.  Appearance:  Well groomed, appearing healthy and of stated age  Behavior:  Cooperative, pleasant, no psychomotor agitation or retardation  Speech:  Normal rate, rhythm, prosody, and volume  Mood:  "Anxious"  Affect:  Euthymic  Thought Process:  Linear, logical, goal directed  Thought Content:  Negative for suicidal ideation, homicidal ideation, delusions or hallucinations.  Associations:  Intact  Memory:  Grossly Intact  Level of Consciousness/Orientation:  Grossly intact  Fund of Knowledge:  Good  Attention:  Good  Language:  Fluent, able to name abstract and concrete objects  Insight:  Good  Judgment:  Intact  Psychomotor signs:  No abnormal movements of face  Gait:  Unable to assess via virtual visit      RELEVANT LABS/STUDIES:    Lab Results   Component Value Date    WBC 10.00 10/12/2020    HGB 14.1 10/12/2020    HCT 41.8 10/12/2020    MCV 87 10/12/2020     10/12/2020     BMP  Lab Results   Component Value Date     10/12/2020    K 3.9 10/12/2020     10/12/2020    CO2 25 10/12/2020    BUN 15 10/12/2020    CREATININE 0.80 10/12/2020    CALCIUM 9.5 10/12/2020    ANIONGAP 8 10/31/2019    ESTGFRAFRICA >60 10/12/2020    EGFRNONAA >60 10/12/2020     Lab Results   Component Value Date    ALT 51 (H) 08/12/2022    AST 39 (H) 08/12/2022    GGT 72 (H) 08/06/2019    ALKPHOS 121 08/12/2022    BILITOT 0.4 08/12/2022     Lab Results   Component Value Date    TSH 0.84 09/06/2022     Lab Results   Component Value Date    HGBA1C 6.0 (H) 08/23/2022       IMPRESSION:    Zo Martínez is a 43 y.o. female with history of Depression, Anxiety, Insomnia, and Tourette's who " presents for follow up appointment.    Status/Progress:  Based on the examination today, the patient's problem(s) is/are inadequately controlled.  New problems have been presented today.    Risk Parameters:  Patient reports no suicidal ideation  Patient reports no homicidal ideation  Patient reports no self-injurious behavior  Patient reports no violent behavior    DIAGNOSES:    ICD-10-CM ICD-9-CM   1. Insomnia due to mental disorder  F51.05 300.9     327.02   2. SKYE (generalized anxiety disorder)  F41.1 300.02   3. Obsessive thinking  F42.8 300.3   4. Anxiety with flying  F40.243 300.29   5. Tourette's syndrome  F95.2 307.23   6. Recurrent major depressive disorder, in full remission  F33.42 296.36       PLAN:  Patient with worsening anxiety at night that started with Ozempic.  Discussed possibility that hypoglycemia however it does seem she is having anxious thoughts as well.  Will start Trazodone 50-100mg qHS PRN insomnia.  She is unsure how long she will be continuing the Ozempic for weight loss.    Continue Lexapro 10mg daily.  Could consider increasing in the future if anxiety does not improve as sleep improves.    Continue Xanax 0.25mg PRN flying anxiety.  Discussed with patient informed consent, risks versus benefits, alternative treatments, side effect profile and the inherent unpredictability of individual responses to these treatments.  The patient expresses understanding of the above and displays the capacity to agree with this current plan.    RETURN TO CLINIC:  Follow up in about 2 months (around 4/27/2023). In person

## 2023-03-15 LAB — HEMOCCULT STL QL IA: NEGATIVE

## 2023-04-25 ENCOUNTER — PATIENT MESSAGE (OUTPATIENT)
Dept: HEPATOLOGY | Facility: CLINIC | Age: 43
End: 2023-04-25
Payer: COMMERCIAL

## 2023-04-25 DIAGNOSIS — R74.8 ELEVATED LIVER ENZYMES: ICD-10-CM

## 2023-04-25 DIAGNOSIS — K76.0 FATTY LIVER: Primary | ICD-10-CM

## 2023-04-25 DIAGNOSIS — K74.00 LIVER FIBROSIS: ICD-10-CM

## 2023-05-03 DIAGNOSIS — I49.1 PREMATURE ATRIAL CONTRACTIONS: Primary | ICD-10-CM

## 2023-05-04 ENCOUNTER — HOSPITAL ENCOUNTER (OUTPATIENT)
Dept: CARDIOLOGY | Facility: CLINIC | Age: 43
Discharge: HOME | End: 2023-05-04
Payer: COMMERCIAL

## 2023-05-04 ENCOUNTER — OFFICE VISIT (OUTPATIENT)
Dept: ELECTROPHYSIOLOGY | Facility: CLINIC | Age: 43
End: 2023-05-04
Payer: COMMERCIAL

## 2023-05-04 VITALS
HEART RATE: 63 BPM | SYSTOLIC BLOOD PRESSURE: 122 MMHG | WEIGHT: 201.5 LBS | HEIGHT: 64 IN | DIASTOLIC BLOOD PRESSURE: 86 MMHG | BODY MASS INDEX: 34.4 KG/M2

## 2023-05-04 DIAGNOSIS — G47.33 OSA (OBSTRUCTIVE SLEEP APNEA): ICD-10-CM

## 2023-05-04 DIAGNOSIS — I49.1 PREMATURE ATRIAL CONTRACTIONS: ICD-10-CM

## 2023-05-04 DIAGNOSIS — I49.1 PREMATURE ATRIAL CONTRACTIONS: Chronic | ICD-10-CM

## 2023-05-04 DIAGNOSIS — I10 ESSENTIAL HYPERTENSION: Chronic | ICD-10-CM

## 2023-05-04 DIAGNOSIS — I47.29 NONSUSTAINED VENTRICULAR TACHYCARDIA: Primary | Chronic | ICD-10-CM

## 2023-05-04 PROCEDURE — 1159F MED LIST DOCD IN RCRD: CPT | Mod: CPTII,S$GLB,, | Performed by: INTERNAL MEDICINE

## 2023-05-04 PROCEDURE — 3079F DIAST BP 80-89 MM HG: CPT | Mod: CPTII,S$GLB,, | Performed by: INTERNAL MEDICINE

## 2023-05-04 PROCEDURE — 93005 ELECTROCARDIOGRAM TRACING: CPT | Mod: S$GLB,,, | Performed by: INTERNAL MEDICINE

## 2023-05-04 PROCEDURE — 1160F PR REVIEW ALL MEDS BY PRESCRIBER/CLIN PHARMACIST DOCUMENTED: ICD-10-PCS | Mod: CPTII,S$GLB,, | Performed by: INTERNAL MEDICINE

## 2023-05-04 PROCEDURE — 3074F SYST BP LT 130 MM HG: CPT | Mod: CPTII,S$GLB,, | Performed by: INTERNAL MEDICINE

## 2023-05-04 PROCEDURE — 1160F RVW MEDS BY RX/DR IN RCRD: CPT | Mod: CPTII,S$GLB,, | Performed by: INTERNAL MEDICINE

## 2023-05-04 PROCEDURE — 99999 PR PBB SHADOW E&M-EST. PATIENT-LVL III: CPT | Mod: PBBFAC,,, | Performed by: INTERNAL MEDICINE

## 2023-05-04 PROCEDURE — 3079F PR MOST RECENT DIASTOLIC BLOOD PRESSURE 80-89 MM HG: ICD-10-PCS | Mod: CPTII,S$GLB,, | Performed by: INTERNAL MEDICINE

## 2023-05-04 PROCEDURE — 3074F PR MOST RECENT SYSTOLIC BLOOD PRESSURE < 130 MM HG: ICD-10-PCS | Mod: CPTII,S$GLB,, | Performed by: INTERNAL MEDICINE

## 2023-05-04 PROCEDURE — 1159F PR MEDICATION LIST DOCUMENTED IN MEDICAL RECORD: ICD-10-PCS | Mod: CPTII,S$GLB,, | Performed by: INTERNAL MEDICINE

## 2023-05-04 PROCEDURE — 99213 OFFICE O/P EST LOW 20 MIN: CPT | Mod: S$GLB,,, | Performed by: INTERNAL MEDICINE

## 2023-05-04 PROCEDURE — 93005 RHYTHM STRIP: ICD-10-PCS | Mod: S$GLB,,, | Performed by: INTERNAL MEDICINE

## 2023-05-04 PROCEDURE — 99213 PR OFFICE/OUTPT VISIT, EST, LEVL III, 20-29 MIN: ICD-10-PCS | Mod: S$GLB,,, | Performed by: INTERNAL MEDICINE

## 2023-05-04 PROCEDURE — 93010 RHYTHM STRIP: ICD-10-PCS | Mod: S$GLB,,, | Performed by: INTERNAL MEDICINE

## 2023-05-04 PROCEDURE — 99999 PR PBB SHADOW E&M-EST. PATIENT-LVL III: ICD-10-PCS | Mod: PBBFAC,,, | Performed by: INTERNAL MEDICINE

## 2023-05-04 PROCEDURE — 93010 ELECTROCARDIOGRAM REPORT: CPT | Mod: S$GLB,,, | Performed by: INTERNAL MEDICINE

## 2023-05-04 PROCEDURE — 3008F BODY MASS INDEX DOCD: CPT | Mod: CPTII,S$GLB,, | Performed by: INTERNAL MEDICINE

## 2023-05-04 PROCEDURE — 3008F PR BODY MASS INDEX (BMI) DOCUMENTED: ICD-10-PCS | Mod: CPTII,S$GLB,, | Performed by: INTERNAL MEDICINE

## 2023-05-04 NOTE — PROGRESS NOTES
"Subjective:    Patient ID:  Zo Martínez is a 43 y.o. female who presents for follow-up of PACs    Referring Physician: Guillermina Zuniga MD    HPIPrior Hx:  I had the pleasure of seeing Mrs. Martínez today in our electrophysiology clinic in consultation for her palpitations and chest pain.  As you are aware she is a pleasant 43 year-old woman with long-history of palpitations, hypertension, depression, and obesity.  She has noted for years to have episodes of skipped beats that occur without an identifiable trigger.  Some days are better than other days.  They are associated with a "sinking" or "dropping" sensation of her heart in her chest.  She has cut out the caffeine from her diet which has not effected her symptoms.  She also has occasional episodes of more sustained palpitations that last for a bit but <30 seconds.  Then recently she had an episode of squeezing/pushing chest pressure in the center of her chest while driving with associated shortness of breath that lasted for ~30 seconds.  A 48 hour holter monitor was performed.  Day 1 revealed 1 PAC.  Day 2 revealed 1108 PACs.  She noted that neither day was truly representative of her typical symptom burden.  Moreover she did not have any of the more "sustained" episodes while wearing the monitor.  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 She reports having early blood pressure in her family but does not believe anyone had a heart attack at a young age. She is a lifelong non-smoker. She is not physically active however wants to start exercising and lose weight.    Mrs. Martínez elected to wear a 14 day ZIO patch which noted rare PACS/PVCS, 1 6 beat run of AT, and 1 4 beat run of WCT, likely non-sustained VT.  There were however 49 patient triggered events with the majority correlating to sinus rhythm. We rx her atenolol as metoprolol had an interaction with bupropion. She had a treadmill exercise stress echo that showed a " "structurally normal heart without any ischemic changes. She presents for follow-up. She reports feeling much better on atenolol and has very few palpitations. She reports she and her  do not prevent pregnancy.    Mrs. Martínez presents for 1 year follow.  Palpitations are well controlled on low-dose metoprolol. She reports a few episodes over the past few weeks where her heart feels like its "buzzing" that can last for 1-2 minutes. She is taking testosterone supplement.    Interim Hx:  Mrs. Martínez returns for follow-up. She has lost some weight and noted she has less palpitations. She was having orthostatic dizziness and began cutting metoprolol in half.    My interpretation of today's in clinic electrocardiogram is normal sinus rhythm with a rate of 63 bpm.    Review of Systems   Constitutional: Negative for malaise/fatigue.   HENT:  Negative for congestion and sore throat.    Eyes:  Negative for blurred vision and visual disturbance.   Cardiovascular:  Positive for palpitations. Negative for chest pain, dyspnea on exertion, irregular heartbeat, leg swelling, near-syncope, orthopnea, paroxysmal nocturnal dyspnea and syncope.   Respiratory:  Negative for cough and shortness of breath.    Hematologic/Lymphatic: Negative for bleeding problem. Does not bruise/bleed easily.   Skin: Negative.    Musculoskeletal: Negative.    Gastrointestinal:  Negative for bloating and abdominal pain.   Neurological:  Negative for dizziness, light-headedness and weakness.      Objective:    Physical Exam  Vitals reviewed.   Constitutional:       General: She is not in acute distress.     Appearance: She is well-developed. She is not diaphoretic.   HENT:      Head: Normocephalic and atraumatic.   Eyes:      General:         Right eye: No discharge.         Left eye: No discharge.      Conjunctiva/sclera: Conjunctivae normal.   Neck:      Vascular: No JVD.   Cardiovascular:      Rate and Rhythm: Normal rate and regular rhythm.      " Heart sounds: Normal heart sounds. No murmur heard.    No friction rub. No gallop.   Pulmonary:      Effort: Pulmonary effort is normal. No respiratory distress.      Breath sounds: Normal breath sounds. No wheezing or rales.   Abdominal:      General: Bowel sounds are normal. There is no distension.      Palpations: Abdomen is soft.      Tenderness: There is no abdominal tenderness. There is no rebound.   Musculoskeletal:      Cervical back: Neck supple.   Skin:     General: Skin is warm and dry.   Neurological:      Mental Status: She is alert and oriented to person, place, and time.   Psychiatric:         Behavior: Behavior normal.         Thought Content: Thought content normal.         Judgment: Judgment normal.         Assessment:       1. Nonsustained ventricular tachycardia    2. Premature atrial contractions    3. Essential hypertension    4. HANG (obstructive sleep apnea): mild per sleep study 10/19         Plan:         In summary, Mrs. Martínez is a pleasant 43 year-old woman with long-history of palpitations, depression, HANG, ALBERTO, and obesity presenting for palpitations and chest discomfort.  Most of her symptoms correlate to sinus rhythm on her monitor.  However some may be from PACs/PVCs. She had 1 4 beat run of likely NSVT on her monitor. Her exercise stress echo was unremarkable for ischemia. Symptoms controlled on low-dose metoprolol and with weight loss.  Continue same. RTC in 1 year, sooner if needed.    Thank you for allowing me to participate in the care of this patient. Please do not hesitate to call me with any questions or concerns.    Chance Martínez MD, PhD  Cardiac Electrophysiology

## 2023-05-11 DIAGNOSIS — F41.1 GAD (GENERALIZED ANXIETY DISORDER): ICD-10-CM

## 2023-05-11 DIAGNOSIS — F40.243 ANXIETY WITH FLYING: ICD-10-CM

## 2023-05-11 RX ORDER — ALPRAZOLAM 0.25 MG/1
0.25 TABLET ORAL NIGHTLY PRN
Qty: 15 TABLET | Refills: 1 | Status: SHIPPED | OUTPATIENT
Start: 2023-05-11

## 2023-06-12 ENCOUNTER — OFFICE VISIT (OUTPATIENT)
Dept: INTERNAL MEDICINE | Facility: CLINIC | Age: 43
End: 2023-06-12
Payer: COMMERCIAL

## 2023-06-12 VITALS
DIASTOLIC BLOOD PRESSURE: 80 MMHG | OXYGEN SATURATION: 96 % | BODY MASS INDEX: 36.43 KG/M2 | WEIGHT: 213.38 LBS | HEIGHT: 64 IN | RESPIRATION RATE: 16 BRPM | SYSTOLIC BLOOD PRESSURE: 132 MMHG | HEART RATE: 74 BPM

## 2023-06-12 DIAGNOSIS — G47.33 OSA (OBSTRUCTIVE SLEEP APNEA): ICD-10-CM

## 2023-06-12 DIAGNOSIS — K76.0 FATTY LIVER: ICD-10-CM

## 2023-06-12 DIAGNOSIS — E66.01 CLASS 2 SEVERE OBESITY DUE TO EXCESS CALORIES WITH SERIOUS COMORBIDITY AND BODY MASS INDEX (BMI) OF 36.0 TO 36.9 IN ADULT: Primary | ICD-10-CM

## 2023-06-12 DIAGNOSIS — R73.03 PRE-DIABETES: ICD-10-CM

## 2023-06-12 PROBLEM — E66.09 CLASS 2 OBESITY DUE TO EXCESS CALORIES WITH BODY MASS INDEX (BMI) OF 36.0 TO 36.9 IN ADULT: Status: ACTIVE | Noted: 2017-09-19

## 2023-06-12 PROBLEM — E66.812 CLASS 2 OBESITY DUE TO EXCESS CALORIES WITH BODY MASS INDEX (BMI) OF 36.0 TO 36.9 IN ADULT: Status: ACTIVE | Noted: 2017-09-19

## 2023-06-12 PROCEDURE — 1159F MED LIST DOCD IN RCRD: CPT | Mod: CPTII,S$GLB,, | Performed by: INTERNAL MEDICINE

## 2023-06-12 PROCEDURE — 3075F SYST BP GE 130 - 139MM HG: CPT | Mod: CPTII,S$GLB,, | Performed by: INTERNAL MEDICINE

## 2023-06-12 PROCEDURE — 3008F BODY MASS INDEX DOCD: CPT | Mod: CPTII,S$GLB,, | Performed by: INTERNAL MEDICINE

## 2023-06-12 PROCEDURE — 99214 OFFICE O/P EST MOD 30 MIN: CPT | Mod: S$GLB,,, | Performed by: INTERNAL MEDICINE

## 2023-06-12 PROCEDURE — 3008F PR BODY MASS INDEX (BMI) DOCUMENTED: ICD-10-PCS | Mod: CPTII,S$GLB,, | Performed by: INTERNAL MEDICINE

## 2023-06-12 PROCEDURE — 3079F DIAST BP 80-89 MM HG: CPT | Mod: CPTII,S$GLB,, | Performed by: INTERNAL MEDICINE

## 2023-06-12 PROCEDURE — 1160F RVW MEDS BY RX/DR IN RCRD: CPT | Mod: CPTII,S$GLB,, | Performed by: INTERNAL MEDICINE

## 2023-06-12 PROCEDURE — 1160F PR REVIEW ALL MEDS BY PRESCRIBER/CLIN PHARMACIST DOCUMENTED: ICD-10-PCS | Mod: CPTII,S$GLB,, | Performed by: INTERNAL MEDICINE

## 2023-06-12 PROCEDURE — 3079F PR MOST RECENT DIASTOLIC BLOOD PRESSURE 80-89 MM HG: ICD-10-PCS | Mod: CPTII,S$GLB,, | Performed by: INTERNAL MEDICINE

## 2023-06-12 PROCEDURE — 99214 PR OFFICE/OUTPT VISIT, EST, LEVL IV, 30-39 MIN: ICD-10-PCS | Mod: S$GLB,,, | Performed by: INTERNAL MEDICINE

## 2023-06-12 PROCEDURE — 3075F PR MOST RECENT SYSTOLIC BLOOD PRESS GE 130-139MM HG: ICD-10-PCS | Mod: CPTII,S$GLB,, | Performed by: INTERNAL MEDICINE

## 2023-06-12 PROCEDURE — 99999 PR PBB SHADOW E&M-EST. PATIENT-LVL IV: ICD-10-PCS | Mod: PBBFAC,,, | Performed by: INTERNAL MEDICINE

## 2023-06-12 PROCEDURE — 1159F PR MEDICATION LIST DOCUMENTED IN MEDICAL RECORD: ICD-10-PCS | Mod: CPTII,S$GLB,, | Performed by: INTERNAL MEDICINE

## 2023-06-12 PROCEDURE — 99999 PR PBB SHADOW E&M-EST. PATIENT-LVL IV: CPT | Mod: PBBFAC,,, | Performed by: INTERNAL MEDICINE

## 2023-06-12 NOTE — PROGRESS NOTES
"Subjective:       Patient ID: Zo Martínez is a 43 y.o. female.    Chief Complaint: weight management      HPI:  Patient is known to me and presents to discuss weight management. She has obesity with BMI 36. She has mild HANG, fatty liver disease and IFG with A1C of 6% 8/23/222.    She was going to [facility] clinic and was started on compounded semaglutide: reports lost about 40 pounds. She was down to 194lb but stopped medication in April and gained weight. "food noise" is increased off the medication. No side effects reported from the medication. She was up to "high dose" but unsure exactly what that dose was.    She has no h/o bulimea, anorexia, seizure disorder.  Wellbutrin caused suicidal thoughts in the past. Contrave may not be ideal option for her given this side effect. She has not been on any other weight loss medications.    She is working on healthy diet and exercise. Long discussion today about need to incorporate lifestyle changes with use of medication and voiced understanding. She is hoping to make this al ifestyle change for her and noted how much better she was feeling with weight loss.    Of note, her LFTs normalized with weight loss. Her last glucose was 89 which is also an improvement with weight loss.         Past Medical History:   Diagnosis Date    Anxiety     Family history of breast cancer in mother 4/18/2017    Family history of colon cancer: father age 55 4/18/2017    Fatty liver 9/6/2019    Gestational diabetes mellitus     Obesity, Class I, BMI 30-34.9     Pre-diabetes 4/18/2017    Severe obesity (BMI 35.0-35.9 with comorbidity) 9/19/2017    Tourette's syndrome     x- have subsided    Transient elevated blood pressure        Family History   Problem Relation Age of Onset    Heart disease Mother         stenting    Diabetes Mother     Cancer Mother 63        breast, also vaginal    Hypertension Mother     Hyperlipidemia Mother     Heart disease Father         CHF    Hypertension Father  "    Cancer Father         colon    Diabetes Sister     Heart disease Sister         stents    Diabetes Sister     Diabetes Brother     Stroke Maternal Grandmother     Heart disease Maternal Grandfather     Heart disease Paternal Grandmother         MI    Heart disease Daughter         Tetralogy    Down syndrome Daughter     No Known Problems Son     Liver cancer Maternal Uncle     Cirrhosis Neg Hx        Social History     Socioeconomic History    Marital status:    Tobacco Use    Smoking status: Never    Smokeless tobacco: Never   Substance and Sexual Activity    Alcohol use: Yes     Comment: rarely, socially, few times a year max     Drug use: No   Social History Narrative    Lives with her , daughter, and 2 cats. 4y/o daughter has Down's Syndrome and Tetralogy of Fallot.      Social Determinants of Health     Financial Resource Strain: Low Risk     Difficulty of Paying Living Expenses: Not very hard   Food Insecurity: No Food Insecurity    Worried About Running Out of Food in the Last Year: Never true    Ran Out of Food in the Last Year: Never true   Transportation Needs: No Transportation Needs    Lack of Transportation (Medical): No    Lack of Transportation (Non-Medical): No   Physical Activity: Inactive    Days of Exercise per Week: 0 days    Minutes of Exercise per Session: 0 min   Stress: No Stress Concern Present    Feeling of Stress : Only a little   Social Connections: Unknown    Frequency of Communication with Friends and Family: Twice a week    Frequency of Social Gatherings with Friends and Family: Once a week    Active Member of Clubs or Organizations: Yes    Attends Club or Organization Meetings: 1 to 4 times per year    Marital Status:    Housing Stability: Low Risk     Unable to Pay for Housing in the Last Year: No    Number of Places Lived in the Last Year: 1    Unstable Housing in the Last Year: No       Review of Systems   Constitutional:  Positive for appetite change and  unexpected weight change. Negative for activity change, fatigue and fever.   HENT:  Negative for congestion, ear pain, hearing loss, rhinorrhea and sore throat.    Eyes:  Negative for pain, redness and visual disturbance.   Respiratory:  Negative for cough, shortness of breath and wheezing.    Cardiovascular:  Negative for chest pain, palpitations and leg swelling.   Gastrointestinal:  Negative for abdominal pain, constipation, diarrhea, nausea and vomiting.   Genitourinary:  Negative for dysuria, frequency, pelvic pain and urgency.   Musculoskeletal:  Negative for back pain, joint swelling and neck pain.   Skin:  Negative for color change, rash and wound.   Neurological:  Negative for dizziness, tremors, weakness, light-headedness and headaches.       Objective:      Physical Exam  Vitals reviewed.   Constitutional:       General: She is not in acute distress.     Appearance: She is well-developed. She is obese.   HENT:      Head: Normocephalic and atraumatic.      Right Ear: External ear normal.      Left Ear: External ear normal.      Nose: Nose normal.   Eyes:      General:         Right eye: No discharge.         Left eye: No discharge.      Conjunctiva/sclera: Conjunctivae normal.   Neck:      Thyroid: No thyromegaly.   Cardiovascular:      Rate and Rhythm: Normal rate and regular rhythm.   Pulmonary:      Effort: Pulmonary effort is normal. No respiratory distress.   Skin:     General: Skin is warm and dry.   Neurological:      Mental Status: She is alert and oriented to person, place, and time. Mental status is at baseline.   Psychiatric:         Behavior: Behavior normal.         Thought Content: Thought content normal.       Assessment:       1. Class 2 severe obesity due to excess calories with serious comorbidity and body mass index (BMI) of 36.0 to 36.9 in adult    2. Pre-diabetes    3. Fatty liver: F3 S3 9/20 needs Hepatology follow up every 6 months    4. HANG (obstructive sleep apnea): mild per sleep  study 10/19        Plan:       1. Class 2 severe obesity due to excess calories with serious comorbidity and body mass index (BMI) of 36.0 to 36.9 in adult  Chronic  She did improve since last visit but this was with use of GLP-1 from outside clinic  She does meet medical criteria for GLP-1 for weight loss with BMI > 35 and comorbidities of HANG, IFG and fatty liver disease  Furthermore, her chronic conditions have showed improvement with her weight loss and I expect continued improvement with further weight loss  Side effects of GLP-1 discussed today in detail. No personal of FH of thyroid or pancreatic disease  Will resume 0.25mg dose for 2 weeks, then 0.5mg dose for 4 weeks then follow up with me for weight check and further dose adjustments  Discussed unknown long term effects and dosing of GLP-1 when using for weight loss. Voiced understanding  -     semaglutide (OZEMPIC) 0.25 mg or 0.5 mg(2 mg/1.5 mL) pen injector; Inject 0.25 mg into the skin every 7 days.  Dispense: 2 each; Refill: 0    BMI 36.6  Weight 213lb    Spent > 10 mins on lifestyle education including healthy diet and exercise.   Consider nutrition referral next visit as well    2. Pre-diabetes  Chronic  Improved with weight loss from compounded GLP-1  Wound benefit from continued use of GLP-1 for weight loss and glucose control  -     semaglutide (OZEMPIC) 0.25 mg or 0.5 mg(2 mg/1.5 mL) pen injector; Inject 0.25 mg into the skin every 7 days.  Dispense: 2 each; Refill: 0    3. Fatty liver: F3 S3 9/20 needs Hepatology follow up every 6 months  Chronic   Improved with weight loss from compounded GLP-1  Wound benefit from continued use of GLP-1 for weight loss and LFT control  -     semaglutide (OZEMPIC) 0.25 mg or 0.5 mg(2 mg/1.5 mL) pen injector; Inject 0.25 mg into the skin every 7 days.  Dispense: 2 each; Refill: 0    4. HANG (obstructive sleep apnea): mild per sleep study 10/19  Chronic, mild  Needs continued weight loss  -     semaglutide  (OZEMPIC) 0.25 mg or 0.5 mg(2 mg/1.5 mL) pen injector; Inject 0.25 mg into the skin every 7 days.  Dispense: 2 each; Refill: 0       RTC 6-8 weeks for weight management and PRN     I spent 32 mins in total on this patient encounter including review of prior medical records, review and personal interpretation of labs from 8/23/22 by Paola Haskins NP and 4/19/23 by myself and Paola Haskins NP. Also direct face to face consultation with the patient, ordering of medication and documentation in the medical records.

## 2023-07-10 ENCOUNTER — PATIENT MESSAGE (OUTPATIENT)
Dept: ADMINISTRATIVE | Facility: HOSPITAL | Age: 43
End: 2023-07-10
Payer: COMMERCIAL

## 2023-08-02 ENCOUNTER — OCCUPATIONAL HEALTH (OUTPATIENT)
Dept: URGENT CARE | Facility: CLINIC | Age: 43
End: 2023-08-02

## 2023-08-02 DIAGNOSIS — Z00.00 ENCOUNTER FOR PHYSICAL EXAMINATION: Primary | ICD-10-CM

## 2023-08-02 PROCEDURE — 80053 COMPREHEN METABOLIC PANEL: CPT | Mod: QW,S$GLB,, | Performed by: NURSE PRACTITIONER

## 2023-08-02 PROCEDURE — 86140 C-REACTIVE PROTEIN: ICD-10-PCS | Mod: S$GLB,,, | Performed by: NURSE PRACTITIONER

## 2023-08-02 PROCEDURE — 99499 UNLISTED E&M SERVICE: CPT | Mod: S$GLB,,, | Performed by: NURSE PRACTITIONER

## 2023-08-02 PROCEDURE — 80061 LIPID PANEL: CPT | Mod: QW,S$GLB,, | Performed by: NURSE PRACTITIONER

## 2023-08-02 PROCEDURE — 94010 PULMONARY FUNCTION SCREENING (OCC MED PHYSICALS): ICD-10-PCS | Mod: S$GLB,,, | Performed by: NURSE PRACTITIONER

## 2023-08-02 PROCEDURE — 80053 COMPREHENSIVE METABOLIC PANEL: ICD-10-PCS | Mod: QW,S$GLB,, | Performed by: NURSE PRACTITIONER

## 2023-08-02 PROCEDURE — 92552 PURE TONE AUDIOMETRY AIR: CPT | Mod: S$GLB,,, | Performed by: NURSE PRACTITIONER

## 2023-08-02 PROCEDURE — 84443 TSH: ICD-10-PCS | Mod: QW,S$GLB,, | Performed by: NURSE PRACTITIONER

## 2023-08-02 PROCEDURE — 80061 LIPID PANEL: ICD-10-PCS | Mod: QW,S$GLB,, | Performed by: NURSE PRACTITIONER

## 2023-08-02 PROCEDURE — 82977 ASSAY OF GGT: CPT | Mod: QW,S$GLB,, | Performed by: NURSE PRACTITIONER

## 2023-08-02 PROCEDURE — 86140 C-REACTIVE PROTEIN: CPT | Mod: S$GLB,,, | Performed by: NURSE PRACTITIONER

## 2023-08-02 PROCEDURE — 82977 GAMMA GT: ICD-10-PCS | Mod: QW,S$GLB,, | Performed by: NURSE PRACTITIONER

## 2023-08-02 PROCEDURE — 93000 EKG 12-LEAD: ICD-10-PCS | Mod: S$GLB,,, | Performed by: INTERNAL MEDICINE

## 2023-08-02 PROCEDURE — 93000 ELECTROCARDIOGRAM COMPLETE: CPT | Mod: S$GLB,,, | Performed by: INTERNAL MEDICINE

## 2023-08-02 PROCEDURE — 99499 PHYSICAL, BASIC COMPLEXITY: ICD-10-PCS | Mod: S$GLB,,, | Performed by: NURSE PRACTITIONER

## 2023-08-02 PROCEDURE — 84443 ASSAY THYROID STIM HORMONE: CPT | Mod: QW,S$GLB,, | Performed by: NURSE PRACTITIONER

## 2023-08-02 PROCEDURE — 94010 BREATHING CAPACITY TEST: CPT | Mod: S$GLB,,, | Performed by: NURSE PRACTITIONER

## 2023-08-02 PROCEDURE — 92552 AUDIOGRAM OCC MED: ICD-10-PCS | Mod: S$GLB,,, | Performed by: NURSE PRACTITIONER

## 2023-08-03 ENCOUNTER — TELEPHONE (OUTPATIENT)
Dept: URGENT CARE | Facility: CLINIC | Age: 43
End: 2023-08-03
Payer: COMMERCIAL

## 2023-08-03 NOTE — TELEPHONE ENCOUNTER
Attempted to call pt with lab results. No answer. VM message left requesting she return call. RAF

## 2023-08-03 NOTE — TELEPHONE ENCOUNTER
Patient returned phone call. Reviewed lab results with her.Copy of results forwarded to her per protocol. RAF

## 2023-08-21 ENCOUNTER — PATIENT MESSAGE (OUTPATIENT)
Dept: HEPATOLOGY | Facility: CLINIC | Age: 43
End: 2023-08-21
Payer: COMMERCIAL

## 2023-08-30 ENCOUNTER — TELEPHONE (OUTPATIENT)
Dept: ADMINISTRATIVE | Facility: OTHER | Age: 43
End: 2023-08-30
Payer: COMMERCIAL

## 2023-09-21 LAB — PAP RECOMMENDATION EXT: NORMAL

## 2023-10-02 ENCOUNTER — OFFICE VISIT (OUTPATIENT)
Dept: PSYCHIATRY | Facility: CLINIC | Age: 43
End: 2023-10-02
Payer: COMMERCIAL

## 2023-10-02 DIAGNOSIS — F33.42 RECURRENT MAJOR DEPRESSIVE DISORDER, IN FULL REMISSION: ICD-10-CM

## 2023-10-02 DIAGNOSIS — F95.2 TOURETTE'S SYNDROME: ICD-10-CM

## 2023-10-02 DIAGNOSIS — F40.240 CLAUSTROPHOBIA: ICD-10-CM

## 2023-10-02 DIAGNOSIS — R45.89 ANXIETY ABOUT HEALTH: Primary | ICD-10-CM

## 2023-10-02 DIAGNOSIS — F42.8 OBSESSIVE THINKING: ICD-10-CM

## 2023-10-02 DIAGNOSIS — F41.1 GAD (GENERALIZED ANXIETY DISORDER): ICD-10-CM

## 2023-10-02 PROCEDURE — 1160F RVW MEDS BY RX/DR IN RCRD: CPT | Mod: CPTII,S$GLB,, | Performed by: INTERNAL MEDICINE

## 2023-10-02 PROCEDURE — 1159F MED LIST DOCD IN RCRD: CPT | Mod: CPTII,S$GLB,, | Performed by: INTERNAL MEDICINE

## 2023-10-02 PROCEDURE — 1160F PR REVIEW ALL MEDS BY PRESCRIBER/CLIN PHARMACIST DOCUMENTED: ICD-10-PCS | Mod: CPTII,S$GLB,, | Performed by: INTERNAL MEDICINE

## 2023-10-02 PROCEDURE — 99999 PR PBB SHADOW E&M-EST. PATIENT-LVL II: CPT | Mod: PBBFAC,,, | Performed by: INTERNAL MEDICINE

## 2023-10-02 PROCEDURE — 99999 PR PBB SHADOW E&M-EST. PATIENT-LVL II: ICD-10-PCS | Mod: PBBFAC,,, | Performed by: INTERNAL MEDICINE

## 2023-10-02 PROCEDURE — 1159F PR MEDICATION LIST DOCUMENTED IN MEDICAL RECORD: ICD-10-PCS | Mod: CPTII,S$GLB,, | Performed by: INTERNAL MEDICINE

## 2023-10-02 PROCEDURE — 99214 OFFICE O/P EST MOD 30 MIN: CPT | Mod: S$GLB,,, | Performed by: INTERNAL MEDICINE

## 2023-10-02 PROCEDURE — 99214 PR OFFICE/OUTPT VISIT, EST, LEVL IV, 30-39 MIN: ICD-10-PCS | Mod: S$GLB,,, | Performed by: INTERNAL MEDICINE

## 2023-10-02 RX ORDER — ESCITALOPRAM OXALATE 10 MG/1
10 TABLET ORAL DAILY
Qty: 90 TABLET | Refills: 3 | Status: SHIPPED | OUTPATIENT
Start: 2023-10-02

## 2023-10-02 NOTE — PROGRESS NOTES
OUTPATIENT PSYCHIATRY RETURN VISIT    ENCOUNTER DATE:  10/2/2023  SITE:  [facility]  LENGTH OF SESSION:  20 minutes    CHIEF COMPLAINT:  Anxiety      HISTORY OF PRESENTING ILLNESS:  Zo Martínez is a 43 y.o. female with history of Depression, Anxiety, Insomnia, and Tourette's who presents for follow up appointment.     Plan at last appointment on 2/27/2023:  Patient with worsening anxiety at night that started with Ozempic.  Discussed possibility that hypoglycemia however it does seem she is having anxious thoughts as well.  Will start Trazodone 50-100mg qHS PRN insomnia.  She is unsure how long she will be continuing the Ozempic for weight loss.    Continue Lexapro 10mg daily.  Could consider increasing in the future if anxiety does not improve as sleep improves.    Continue Xanax 0.25mg PRN flying anxiety.  Discussed with patient informed consent, risks versus benefits, alternative treatments, side effect profile and the inherent unpredictability of individual responses to these treatments.  The patient expresses understanding of the above and displays the capacity to agree with this current plan.    History as told by patient:  Overall doing well.  Daughter is 12 y/o (Down Syndrome), leaky valve they are watching but otherwise healthy.  Son is 9 y/o with ADHD - on medication now.  When she stopped Ozempic, the night time anxiety went right away - so thinks it was definitely hypoglycemia.  Rarely takes Trazodone, but very helpful if she needs it.  No interest in intimacy with .  Doesn't think this is Lexapro - believes its related to menopause.  Gynecologist recommending the pellet - helped sex drive but had facial hair so stopped.  Has health related panic attack.  Only takes Xanax for flying.  Distraction is most helpful for panic symptoms.  Watched father with health issues, then mother die.  But fear of dying has gotten better as she has gotten older.  Fear of leaving  daughter behind since she is special needs.  Panic attacks a few times a year - fears she is having a heart attack.  Has lost 40 pounds on generic Mounjaro.  Denies depression, anxiety stable.      Medication side effects:  As above  Medication compliance:  Yes    PSYCHIATRIC REVIEW OF SYSTEMS:  Trouble with sleep:  Denies  Appetite changes:  Denies  Weight changes:  Loss as above  Lack of energy:  Denies  Anhedonia:  Denies  Somatic symptoms:  Denies  Libido:  Decreased sex drive as above  Anxiety/panic:  Stable  Guilty/hopeless:  Denies  Self-injurious behavior/risky behavior:  Denies  Any drugs:  Denies  Alcohol:  Denies    MEDICAL REVIEW OF SYSTEMS:  Complete review of systems performed covering Constitutional, Musculoskeletal, Neurologic.  All systems negative except for that covered in HPI.    PAST PSYCHIATRIC, MEDICAL, AND SOCIAL HISTORY REVIEWED  The patient's past medical, family and social history have been reviewed and updated as appropriate within the electronic medical record - see encounter notes.    MEDICATIONS:    Current Outpatient Medications:     ALPRAZolam (XANAX) 0.25 MG tablet, Take 1 tablet (0.25 mg total) by mouth nightly as needed for Anxiety., Disp: 15 tablet, Rfl: 1    EScitalopram oxalate (LEXAPRO) 10 MG tablet, Take 1 tablet (10 mg total) by mouth once daily., Disp: 90 tablet, Rfl: 3    metoprolol succinate (TOPROL-XL) 25 MG 24 hr tablet, TAKE 1 TABLET(25 MG) BY MOUTH EVERY DAY, Disp: 90 tablet, Rfl: 3    semaglutide (OZEMPIC) 0.25 mg or 0.5 mg(2 mg/1.5 mL) pen injector, Inject 0.25 mg into the skin every 7 days., Disp: 2 each, Rfl: 0    traZODone (DESYREL) 50 MG tablet, Take 1-2 tablets ( mg total) by mouth nightly as needed for Insomnia., Disp: 60 tablet, Rfl: 3    ALLERGIES:  Review of patient's allergies indicates:  No Known Allergies    PSYCHIATRIC EXAM:  There were no vitals filed for this visit.  Appearance:  Well groomed, appearing healthy and of stated age  Behavior:   "Cooperative, pleasant, no psychomotor agitation or retardation  Speech:  Normal rate, rhythm, prosody, and volume  Mood:  "Good"  Affect:  Euthymic  Thought Process:  Linear, logical, goal directed  Thought Content:  Negative for suicidal ideation, homicidal ideation, delusions or hallucinations.  Associations:  Intact  Memory:  Grossly Intact  Level of Consciousness/Orientation:  Grossly intact  Fund of Knowledge:  Good  Attention:  Good  Language:  Fluent, able to name abstract and concrete objects  Insight:  Good  Judgment:  Intact  Psychomotor signs:  No involuntary movements or tremor  Gait:  Normal      RELEVANT LABS/STUDIES:    Lab Results   Component Value Date    WBC 10.00 10/12/2020    HGB 14.1 10/12/2020    HCT 41.8 10/12/2020    MCV 87 10/12/2020     10/12/2020     BMP  Lab Results   Component Value Date     04/19/2023    K 4.7 04/19/2023     04/19/2023    CO2 29 04/19/2023    BUN 19 (H) 04/19/2023    CREATININE 0.83 04/19/2023    CALCIUM 8.4 04/19/2023    ANIONGAP 9 04/19/2023    ESTGFRAFRICA >60 10/12/2020    EGFRNONAA >60 10/12/2020     Lab Results   Component Value Date    ALT 21 04/19/2023    ALT 21 04/19/2023    AST 28 04/19/2023    AST 28 04/19/2023    GGT 72 (H) 08/06/2019    ALKPHOS 135 04/19/2023    ALKPHOS 135 04/19/2023    BILITOT 0.4 04/19/2023    BILITOT 0.4 04/19/2023     Lab Results   Component Value Date    TSH 0.84 09/06/2022     Lab Results   Component Value Date    HGBA1C 6.0 (H) 08/23/2022       IMPRESSION:    Zo Martínez is a 43 y.o. female with history of Depression, Anxiety, Insomnia, and Tourette's who presents for follow up appointment.    Status/Progress:  Based on the examination today, the patient's problem(s) is/are well controlled.  New problems have not been presented today.    Risk Parameters:  Patient reports no suicidal ideation  Patient reports no homicidal ideation  Patient reports no self-injurious behavior  Patient reports no violent " behavior      DIAGNOSES:    ICD-10-CM ICD-9-CM   1. Anxiety about health  F41.8 300.09   2. Obsessive thinking  F42.8 300.3   3. Claustrophobia  F40.240 300.29   4. Recurrent major depressive disorder, in full remission  F33.42 296.36   5. Tourette's syndrome  F95.2 307.23         PLAN:  Symptoms stable.  Continue Lexapro 10mg daily.    Continue Xanax 0.25mg PRN flying anxiety and Trazodone 50mg qHS PRN insomnia.  Discussed with patient informed consent, risks versus benefits, alternative treatments, side effect profile and the inherent unpredictability of individual responses to these treatments.  The patient expresses understanding of the above and displays the capacity to agree with this current plan.    RETURN TO CLINIC:  Follow up in about 1 year (around 10/2/2024). In person

## 2023-11-01 ENCOUNTER — OFFICE VISIT (OUTPATIENT)
Dept: HEPATOLOGY | Facility: CLINIC | Age: 43
End: 2023-11-01
Payer: COMMERCIAL

## 2023-11-01 ENCOUNTER — PROCEDURE VISIT (OUTPATIENT)
Dept: HEPATOLOGY | Facility: CLINIC | Age: 43
End: 2023-11-01
Payer: COMMERCIAL

## 2023-11-01 VITALS — WEIGHT: 199.06 LBS | HEIGHT: 64 IN | BODY MASS INDEX: 33.98 KG/M2

## 2023-11-01 DIAGNOSIS — K74.00 LIVER FIBROSIS: ICD-10-CM

## 2023-11-01 DIAGNOSIS — I10 ESSENTIAL HYPERTENSION: Chronic | ICD-10-CM

## 2023-11-01 DIAGNOSIS — K74.01 HEPATIC FIBROSIS, EARLY FIBROSIS: ICD-10-CM

## 2023-11-01 DIAGNOSIS — K76.0 FATTY LIVER: Primary | ICD-10-CM

## 2023-11-01 DIAGNOSIS — E66.09 CLASS 1 OBESITY DUE TO EXCESS CALORIES WITH SERIOUS COMORBIDITY AND BODY MASS INDEX (BMI) OF 34.0 TO 34.9 IN ADULT: ICD-10-CM

## 2023-11-01 DIAGNOSIS — R74.8 ELEVATED LIVER ENZYMES: ICD-10-CM

## 2023-11-01 DIAGNOSIS — K76.0 FATTY LIVER: ICD-10-CM

## 2023-11-01 DIAGNOSIS — R73.03 PRE-DIABETES: ICD-10-CM

## 2023-11-01 PROBLEM — E66.811 CLASS 1 OBESITY DUE TO EXCESS CALORIES WITH BODY MASS INDEX (BMI) OF 34.0 TO 34.9 IN ADULT: Status: ACTIVE | Noted: 2017-09-19

## 2023-11-01 PROCEDURE — 99214 OFFICE O/P EST MOD 30 MIN: CPT | Mod: S$GLB,,, | Performed by: NURSE PRACTITIONER

## 2023-11-01 PROCEDURE — 99999 PR PBB SHADOW E&M-EST. PATIENT-LVL III: ICD-10-PCS | Mod: PBBFAC,,, | Performed by: NURSE PRACTITIONER

## 2023-11-01 PROCEDURE — 76981 USE PARENCHYMA: CPT | Mod: S$GLB,,, | Performed by: NURSE PRACTITIONER

## 2023-11-01 PROCEDURE — 99999 PR PBB SHADOW E&M-EST. PATIENT-LVL III: CPT | Mod: PBBFAC,,, | Performed by: NURSE PRACTITIONER

## 2023-11-01 PROCEDURE — 99214 PR OFFICE/OUTPT VISIT, EST, LEVL IV, 30-39 MIN: ICD-10-PCS | Mod: S$GLB,,, | Performed by: NURSE PRACTITIONER

## 2023-11-01 PROCEDURE — 1160F RVW MEDS BY RX/DR IN RCRD: CPT | Mod: CPTII,S$GLB,, | Performed by: NURSE PRACTITIONER

## 2023-11-01 PROCEDURE — 76981 FIBROSCAN NEW ORLEANS (VIBRATION CONTROLLED TRANSIENT ELASTOGRAPHY): ICD-10-PCS | Mod: S$GLB,,, | Performed by: NURSE PRACTITIONER

## 2023-11-01 PROCEDURE — 1159F MED LIST DOCD IN RCRD: CPT | Mod: CPTII,S$GLB,, | Performed by: NURSE PRACTITIONER

## 2023-11-01 PROCEDURE — 1160F PR REVIEW ALL MEDS BY PRESCRIBER/CLIN PHARMACIST DOCUMENTED: ICD-10-PCS | Mod: CPTII,S$GLB,, | Performed by: NURSE PRACTITIONER

## 2023-11-01 PROCEDURE — 3008F BODY MASS INDEX DOCD: CPT | Mod: CPTII,S$GLB,, | Performed by: NURSE PRACTITIONER

## 2023-11-01 PROCEDURE — 1159F PR MEDICATION LIST DOCUMENTED IN MEDICAL RECORD: ICD-10-PCS | Mod: CPTII,S$GLB,, | Performed by: NURSE PRACTITIONER

## 2023-11-01 PROCEDURE — 3008F PR BODY MASS INDEX (BMI) DOCUMENTED: ICD-10-PCS | Mod: CPTII,S$GLB,, | Performed by: NURSE PRACTITIONER

## 2023-11-01 NOTE — PROGRESS NOTES
[facility] Established Patient Visit    Reason for Visit:  Fatty liver, h/o liver fibrosis     PCP: Velvet Johansen    HPI:  This is a 43 y.o. female with PMH noted below, here for follow up of above    Serological workup was negative for Marco's, alpha-1 antitrypsin deficiency, hemochromatosis, autoimmune etiology, and viral hepatitis A, B and C    Prior serologic workup:   Lab Results   Component Value Date    SMOOTHMUSCAB Negative 1:40 08/30/2019    AMAIFA Negative 1:40 08/30/2019    IGGSERUM 1363 08/30/2019    ANASCREEN Negative <1:160 08/30/2019    FERRITIN 87 08/30/2019    FESATURATED 10 (L) 08/30/2019    PETH Negative 09/06/2019    CERULOPLSM 35.0 08/30/2019    HEPBSAG Negative 08/16/2019    HEPCAB Negative 08/16/2019    HEPAIGM Negative 08/16/2019     Risk factors for NAFLD include obesity, HTN, pre-DM    Liver fibrosis staging:  -- fibroscan 9/2021 noted F3, S3 (kPA 10.2, )  -- FIbroscan 8/2022 noted F2, S3 (kPA 9, )  -- MRI elasto 9/2023 noted F0, severe steatosis   -- fibroscan 10/2023 noted F1, S2 (kPA 7.3, )    Interval HPI: Presents today alone. Previous fibroscans F2-F3. Since then, has lost a significant amount of weight. Previous wt 232 lbs, current wt 199 lbs   Previously on compounded Semaglutide, now on Mojouro 5 mg weekly  Fibroscan much improved for steatosis and fibrosis, now F1 on fibrosis   Labs normalized with weight loss     Labs done 4/2023 showed normal transaminase levels (ALT > AST, previously increasing elevation since 8/2019, previously WNL/mild elevation in [id number] )  Platelets and alk phos WNL  Synthetic liver functioning WNL    Lab Results   Component Value Date    ALT 21 04/19/2023    ALT 21 04/19/2023    AST 28 04/19/2023    AST 28 04/19/2023    ALKPHOS 135 04/19/2023    ALKPHOS 135 04/19/2023    BILITOT 0.4 04/19/2023    BILITOT 0.4 04/19/2023    ALBUMIN 3.9 04/19/2023    ALBUMIN 3.9 04/19/2023    INR 0.9 12/13/2019     10/12/2020      Abd MRI done 9/2022 showed hepatomegaly, fatty liver. No splenomegaly    Denies family history of liver disease . Denies alcohol consumption currently or in the past    Immunity to Hep A and B - needs [employer], previously sent to [facility] pharm and ID      PMHX:  has a past medical history of Anxiety, Family history of breast cancer in mother (4/18/2017), Family history of colon cancer: father age 55 (4/18/2017), Fatty liver (9/6/2019), Gestational diabetes mellitus, Obesity, Class I, BMI 30-34.9, Pre-diabetes (4/18/2017), Severe obesity (BMI 35.0-35.9 with comorbidity) (9/19/2017), Tourette's syndrome, and Transient elevated blood pressure.    PSHX:  has a past surgical history that includes Cholecystectomy; Dilation and curettage of uterus; and Eye surgery (Bilateral, 11/2022).    The patient's social and family histories were reviewed by me and updated in the appropriate section of the electronic medical record.    Review of patient's allergies indicates:  No Known Allergies    Current Outpatient Medications on File Prior to Visit   Medication Sig Dispense Refill    ALPRAZolam (XANAX) 0.25 MG tablet Take 1 tablet (0.25 mg total) by mouth nightly as needed for Anxiety. 15 tablet 1    EScitalopram oxalate (LEXAPRO) 10 MG tablet Take 1 tablet (10 mg total) by mouth once daily. 90 tablet 3    metoprolol succinate (TOPROL-XL) 25 MG 24 hr tablet TAKE 1 TABLET(25 MG) BY MOUTH EVERY DAY 90 tablet 3    semaglutide (OZEMPIC) 0.25 mg or 0.5 mg(2 mg/1.5 mL) pen injector Inject 0.25 mg into the skin every 7 days. 2 each 0    traZODone (DESYREL) 50 MG tablet Take 1-2 tablets ( mg total) by mouth nightly as needed for Insomnia. 60 tablet 3     No current facility-administered medications on file prior to visit.       SOCIAL HISTORY:   Social History     Substance and Sexual Activity   Alcohol Use Yes    Comment: rarely, socially, few times a year max        Social History     Substance and Sexual Activity   Drug Use No  "      ROS:   GENERAL: Denies fatigue  CARDIOVASCULAR: Denies edema  GI: Denies abdominal pain  SKIN: Denies rash, itching   NEURO: Denies confusion, memory loss, or mood changes    Objective Findings:    PHYSICAL EXAM:   Friendly White female, in no acute distress; alert and oriented to person, place and time  VITALS: Ht 5' 4" (1.626 m)   Wt 90.3 kg (199 lb 1.2 oz)   BMI 34.17 kg/m²   EYES: Sclerae anicteric  GI: Soft, non-tender, non-distended. No ascites.  EXTREMITIES:  No edema.  SKIN: Warm and dry. No jaundice. No telangectasias noted. No palmar erythema.  NEURO:  No asterixis.  PSYCH:  Thought and speech pattern appropriate. Behavior normal      EDUCATION:  See instructions discussed with patient in Instructions section of the After Visit Summary     ASSESSMENT & PLAN:  43 y.o. White female with:  1.  Liver fibrosis  -- F1 on fibroscan, much improved with weight loss, repeat in 1 year     2. Fatty liver, likely related to metabolic risk factors   -- significantly improved with lifestyle/weight changes  - see HPI  -- fibroscan F1, S2 - will repeat yearly   -- Immunity to Hep A and B : see HPI  -- Recommendations discussed with patient:  Limit alcohol consumption, none given possible scar tissue in the liver   2 Continue Weight loss, next goal of 20 lbs  3. Low carb/sugar, high fiber and protein diet, limit your carb intake to LESS than 30-45 grams of carbs with a meal or LESS than 5-10 grams with any snack   4. Exercise, 5 days per week, 30 minutes per day, as tolerated  5. Recommend good cholesterol, blood pressure, blood sugar levels     3. Elevated liver enzymes  -- likely related to fatty liver given normalization with weight loss   -- Serological workup was negative for Marco's, alpha-1 antitrypsin deficiency, hemochromatosis, autoimmune etiology, and viral hepatitis A, B and C    4. Obesity, HTN, preDM   -- Body mass index is 34.17 kg/m².   -- increases risk for fatty liver          Follow up in about 1 " year (around 11/1/2024).  With lab, US and fibroscan before   Orders Placed This Encounter   Procedures    FibroScan [place] (Vibration Controlled Transient Elastography)    US Abdomen Complete    Hepatic Function Panel          Thank you for allowing me to participate in the care of Zoyancy Sanchez SABINE Dunn    I spent a total of 30 minutes on the day of the visit.This includes face to face time and non-face to face time preparing to see the patient (eg, review of tests), obtaining and/or reviewing separately obtained history, documenting clinical information in the electronic or other health record, independently interpreting results and communicating results to the patient/family/caregiver, and coordinating care.       CC'ed note to:   Velvet Johansen MD

## 2023-11-01 NOTE — PROCEDURES
FibroScan [place] (Vibration Controlled Transient Elastography)    Date/Time: 11/1/2023 9:45 AM    Performed by: Paola Haskins NP  Authorized by: Paola Haskins, NAIDA    Diagnosis:  NAFLD    Probe:  M    Universal Protocol: Patient's identity, procedure and site were verified, confirmatory pause was performed.  Discussed procedure including risks and potential complications.  Questions answered.  Patient verbalizes understanding and wishes to proceed with VCTE.     Procedure: After providing explanations of the procedure, patient was placed in the supine position with right arm in maximum abduction to allow optimal exposure of right lateral abdomen.  Patient was briefly assessed, Testing was performed in the mid-axillary location, 50Hz Shear Wave pulses were applied and the resulting Shear Wave and Propagation Speed detected with a 3.5 MHz ultrasonic signal, using the FibroScan probe, Skin to liver capsule distance and liver parenchyma were accessed during the entire examination with the FibroScan probe, Patient was instructed to breathe normally and to abstain from sudden movements during the procedure, allowing for random measurements of liver stiffness. At least 10 Shear Waves were produced, Individual measurements of each Shear Wave were calculated.  Patient tolerated the procedure well with no complications.  Meets discharge criteria as was dismissed.  Rates pain 0 out of 10.  Patient will follow up with ordering provider to review results.    Findings  Median liver stiffness score:  7.3  CAP Reading: dB/m:  282    IQR/med %:  11  Interpretation  Fibrosis interpretation is based on medial liver stiffness - Kilopascal (kPa).    Fibrosis Stage:  F 0-1  Steatosis interpretation is based on controlled attenuation parameter - (dB/m).    Steatosis Grade:  S2

## 2023-11-01 NOTE — PATIENT INSTRUCTIONS
1. Fibroscan to look for fat or scar tissue in the liver showed ~60% fat in the liver, improvement to stage 1 scar tissue (from stage 2-3)  2.  Follow up in 1 year with US and lab a few days before, fibroscan same day     There is no FDA approved therapy for fatty liver disease. Therefore, these things are important:  Limit alcohol consumption, none given possible scar tissue   2  Continue weight loss, next goal ~15 lbs   3. Low carb/sugar and high protein diet (~1 g/kg/day of protein).Try to limit your carb intake to LESS than 30-45 grams of carbs with a meal or LESS than 5-10 grams with any snack (total of any snack foods eaten during that time). Use MyFitness Pal or Lose It josh to add up your carbs through the day. Do NOT drink any beverages with calories or carbs (this can lead to high blood sugar and weight gain). Also, some of our patients have been very successful with weight loss using the pre made/planned meal planning services that limit calories and portion size ( The main thing to look for is low calorie, high protein, low carb)  4. Exercise, 5 days per week, 30 minutes per day, as tolerated  5. Recommend good cholesterol, blood pressure, blood sugar levels       In some people, fatty liver can progress to steatohepatitis (inflamatory fatty liver) and possibly to cirrhosis, increasing the risk for liver cancer, liver failure, and death. Therefore, the lifestyle changes are very important to decrease this risk.     Website with information about fatty liver and inflammation related to fatty liver (ALBERTO) = www.nashtruth.com  AND www.NASHactually.com

## 2023-12-01 ENCOUNTER — PATIENT MESSAGE (OUTPATIENT)
Dept: INTERNAL MEDICINE | Facility: CLINIC | Age: 43
End: 2023-12-01
Payer: COMMERCIAL

## 2024-02-19 ENCOUNTER — PATIENT MESSAGE (OUTPATIENT)
Dept: ADMINISTRATIVE | Facility: HOSPITAL | Age: 44
End: 2024-02-19
Payer: COMMERCIAL

## 2024-02-28 DIAGNOSIS — R73.03 PREDIABETES: ICD-10-CM

## 2024-03-01 ENCOUNTER — PATIENT OUTREACH (OUTPATIENT)
Dept: ADMINISTRATIVE | Facility: HOSPITAL | Age: 44
End: 2024-03-01
Payer: COMMERCIAL

## 2024-03-01 DIAGNOSIS — Z12.11 COLON CANCER SCREENING: Primary | ICD-10-CM

## 2024-03-01 NOTE — PROGRESS NOTES
Chart reviewed, immunization record updated.  No new results noted on [facility] web site.  Care Everywhere updated.   Patient care coordination note updated.   Upcoming PCP visit updated.  Next PCP visit Not scheduled.  LOV with PCP 06/12/2023.  Patient replied to portal outreach message to discuss Colorectal Cancer screening.  Patient opted for Cologuard.  Cologuard order placed.  Received external Pap Smear collected/ completed on 09/21/2023, updated to .

## 2024-03-06 ENCOUNTER — OFFICE VISIT (OUTPATIENT)
Dept: INTERNAL MEDICINE | Facility: CLINIC | Age: 44
End: 2024-03-06
Payer: COMMERCIAL

## 2024-03-06 ENCOUNTER — LAB VISIT (OUTPATIENT)
Dept: LAB | Facility: HOSPITAL | Age: 44
End: 2024-03-06
Attending: INTERNAL MEDICINE
Payer: COMMERCIAL

## 2024-03-06 VITALS
RESPIRATION RATE: 16 BRPM | SYSTOLIC BLOOD PRESSURE: 132 MMHG | OXYGEN SATURATION: 98 % | HEIGHT: 64 IN | DIASTOLIC BLOOD PRESSURE: 86 MMHG | HEART RATE: 72 BPM | WEIGHT: 212.75 LBS | BODY MASS INDEX: 36.32 KG/M2

## 2024-03-06 DIAGNOSIS — R10.11 RUQ PAIN: Primary | ICD-10-CM

## 2024-03-06 DIAGNOSIS — K76.0 FATTY LIVER: ICD-10-CM

## 2024-03-06 DIAGNOSIS — R10.11 RUQ PAIN: ICD-10-CM

## 2024-03-06 LAB
ALBUMIN SERPL BCP-MCNC: 3.7 G/DL (ref 3.5–5.2)
ALP SERPL-CCNC: 119 U/L (ref 55–135)
ALT SERPL W/O P-5'-P-CCNC: 18 U/L (ref 10–44)
ANION GAP SERPL CALC-SCNC: 7 MMOL/L (ref 8–16)
AST SERPL-CCNC: 18 U/L (ref 10–40)
BASOPHILS # BLD AUTO: 0.07 K/UL (ref 0–0.2)
BASOPHILS NFR BLD: 0.8 % (ref 0–1.9)
BILIRUB SERPL-MCNC: 0.3 MG/DL (ref 0.1–1)
BUN SERPL-MCNC: 17 MG/DL (ref 6–20)
CALCIUM SERPL-MCNC: 9.3 MG/DL (ref 8.7–10.5)
CHLORIDE SERPL-SCNC: 107 MMOL/L (ref 95–110)
CO2 SERPL-SCNC: 27 MMOL/L (ref 23–29)
CREAT SERPL-MCNC: 0.9 MG/DL (ref 0.5–1.4)
DIFFERENTIAL METHOD BLD: ABNORMAL
EOSINOPHIL # BLD AUTO: 0.2 K/UL (ref 0–0.5)
EOSINOPHIL NFR BLD: 2.1 % (ref 0–8)
ERYTHROCYTE [DISTWIDTH] IN BLOOD BY AUTOMATED COUNT: 14.6 % (ref 11.5–14.5)
EST. GFR  (NO RACE VARIABLE): >60 ML/MIN/1.73 M^2
GLUCOSE SERPL-MCNC: 80 MG/DL (ref 70–110)
HCT VFR BLD AUTO: 41.9 % (ref 37–48.5)
HGB BLD-MCNC: 12.8 G/DL (ref 12–16)
IMM GRANULOCYTES # BLD AUTO: 0.02 K/UL (ref 0–0.04)
IMM GRANULOCYTES NFR BLD AUTO: 0.2 % (ref 0–0.5)
LYMPHOCYTES # BLD AUTO: 2.6 K/UL (ref 1–4.8)
LYMPHOCYTES NFR BLD: 30.5 % (ref 18–48)
MCH RBC QN AUTO: 25.8 PG (ref 27–31)
MCHC RBC AUTO-ENTMCNC: 30.5 G/DL (ref 32–36)
MCV RBC AUTO: 84 FL (ref 82–98)
MONOCYTES # BLD AUTO: 0.6 K/UL (ref 0.3–1)
MONOCYTES NFR BLD: 7.4 % (ref 4–15)
NEUTROPHILS # BLD AUTO: 5 K/UL (ref 1.8–7.7)
NEUTROPHILS NFR BLD: 59 % (ref 38–73)
NRBC BLD-RTO: 0 /100 WBC
PLATELET # BLD AUTO: 342 K/UL (ref 150–450)
PMV BLD AUTO: 10.5 FL (ref 9.2–12.9)
POTASSIUM SERPL-SCNC: 4.6 MMOL/L (ref 3.5–5.1)
PROT SERPL-MCNC: 7.7 G/DL (ref 6–8.4)
RBC # BLD AUTO: 4.97 M/UL (ref 4–5.4)
SODIUM SERPL-SCNC: 141 MMOL/L (ref 136–145)
WBC # BLD AUTO: 8.43 K/UL (ref 3.9–12.7)

## 2024-03-06 PROCEDURE — 3079F DIAST BP 80-89 MM HG: CPT | Mod: CPTII,S$GLB,, | Performed by: INTERNAL MEDICINE

## 2024-03-06 PROCEDURE — 3008F BODY MASS INDEX DOCD: CPT | Mod: CPTII,S$GLB,, | Performed by: INTERNAL MEDICINE

## 2024-03-06 PROCEDURE — 36415 COLL VENOUS BLD VENIPUNCTURE: CPT | Performed by: INTERNAL MEDICINE

## 2024-03-06 PROCEDURE — 80053 COMPREHEN METABOLIC PANEL: CPT | Performed by: INTERNAL MEDICINE

## 2024-03-06 PROCEDURE — 85025 COMPLETE CBC W/AUTO DIFF WBC: CPT | Performed by: INTERNAL MEDICINE

## 2024-03-06 PROCEDURE — 99213 OFFICE O/P EST LOW 20 MIN: CPT | Mod: S$GLB,,, | Performed by: INTERNAL MEDICINE

## 2024-03-06 PROCEDURE — 99999 PR PBB SHADOW E&M-EST. PATIENT-LVL IV: CPT | Mod: PBBFAC,,, | Performed by: INTERNAL MEDICINE

## 2024-03-06 PROCEDURE — 1160F RVW MEDS BY RX/DR IN RCRD: CPT | Mod: CPTII,S$GLB,, | Performed by: INTERNAL MEDICINE

## 2024-03-06 PROCEDURE — 1159F MED LIST DOCD IN RCRD: CPT | Mod: CPTII,S$GLB,, | Performed by: INTERNAL MEDICINE

## 2024-03-06 PROCEDURE — 3075F SYST BP GE 130 - 139MM HG: CPT | Mod: CPTII,S$GLB,, | Performed by: INTERNAL MEDICINE

## 2024-03-06 RX ORDER — SPIRONOLACTONE 100 MG/1
100 TABLET, FILM COATED ORAL
COMMUNITY
Start: 2024-01-23

## 2024-03-06 NOTE — PROGRESS NOTES
Subjective:       Patient ID: Zo Martínez is a 44 y.o. female.    Chief Complaint: Abdominal Pain (Left sided abdominal pain. Comes and goes. Tender to touch sometimes. )      HPI:  Patient is known tome and presents with RUQ pain. Sx started 1 week ago. She initially had some lower abd pain but that has since resolved. Pain is not severe but it is abnormal for her. Pain comes and goes. No constipation + flatus. + nausea but no vomiting.     She is taking compounded semaglutide. She stopped taking injection 1.5 weeks ago.   She has h/o fatty liver disease.     Past Medical History:   Diagnosis Date    Anxiety     Family history of breast cancer in mother 4/18/2017    Family history of colon cancer: father age 55 4/18/2017    Fatty liver 9/6/2019    Gestational diabetes mellitus     Obesity, Class I, BMI 30-34.9     Pre-diabetes 4/18/2017    Severe obesity (BMI 35.0-35.9 with comorbidity) 9/19/2017    Tourette's syndrome     x- have subsided    Transient elevated blood pressure        Family History   Problem Relation Age of Onset    Heart disease Mother         stenting    Diabetes Mother     Cancer Mother 63        breast, also vaginal    Hypertension Mother     Hyperlipidemia Mother     Breast cancer Mother     Heart disease Father         CHF    Hypertension Father     Cancer Father         colon    Diabetes Sister     Heart disease Sister         stents    Diabetes Sister     Heart disease Daughter         Tetralogy    Down syndrome Daughter     Breast cancer Maternal Aunt     Liver cancer Maternal Uncle     Stroke Maternal Grandmother     Heart disease Maternal Grandfather     Heart disease Paternal Grandmother         MI    Diabetes Brother     No Known Problems Son     Cirrhosis Neg Hx        Social History     Socioeconomic History    Marital status:    Tobacco Use    Smoking status: Never    Smokeless tobacco: Never   Substance and Sexual Activity    Alcohol use: Yes     Comment: rarely,  socially, few times a year max     Drug use: No   Social History Narrative    Lives with her , daughter, and 2 cats. 4y/o daughter has Down's Syndrome and Tetralogy of Fallot.      Social Determinants of Health     Financial Resource Strain: Medium Risk (11/1/2023)    Overall Financial Resource Strain (CARDIA)     Difficulty of Paying Living Expenses: Somewhat hard   Food Insecurity: No Food Insecurity (11/1/2023)    Hunger Vital Sign     Worried About Running Out of Food in the Last Year: Never true     Ran Out of Food in the Last Year: Never true   Recent Concern: Food Insecurity - Food Insecurity Present (10/2/2023)    Hunger Vital Sign     Worried About Running Out of Food in the Last Year: Sometimes true     Ran Out of Food in the Last Year: Patient declined   Transportation Needs: No Transportation Needs (11/1/2023)    PRAPARE - Transportation     Lack of Transportation (Medical): No     Lack of Transportation (Non-Medical): No   Physical Activity: Inactive (11/1/2023)    Exercise Vital Sign     Days of Exercise per Week: 0 days     Minutes of Exercise per Session: 0 min   Stress: No Stress Concern Present (11/1/2023)    [employer] - Occupational Stress Questionnaire     Feeling of Stress : Not at all   Recent Concern: Stress - Stress Concern Present (10/2/2023)    [employer] - Occupational Stress Questionnaire     Feeling of Stress : To some extent   Social Connections: Unknown (11/1/2023)    Social Connection and Isolation Panel [NHANES]     Frequency of Communication with Friends and Family: Twice a week     Frequency of Social Gatherings with Friends and Family: Once a week     Active Member of Clubs or Organizations: Yes     Attends Club or Organization Meetings: More than 4 times per year     Marital Status:    Housing Stability: Unknown (11/1/2023)    Housing Stability Vital Sign     Unable to Pay for Housing in the Last Year:  Patient refused     Number of Places Lived in the Last Year: 1     Unstable Housing in the Last Year: No   Recent Concern: Housing Stability - High Risk (10/2/2023)    Housing Stability Vital Sign     Unable to Pay for Housing in the Last Year: Yes     Number of Places Lived in the Last Year: 1     Unstable Housing in the Last Year: No       Review of Systems   Constitutional:  Negative for activity change, fatigue, fever and unexpected weight change.   HENT:  Negative for congestion, ear pain, hearing loss, rhinorrhea and sore throat.    Eyes:  Negative for redness and visual disturbance.   Respiratory:  Negative for cough, shortness of breath and wheezing.    Cardiovascular:  Negative for chest pain, palpitations and leg swelling.   Gastrointestinal:  Positive for abdominal pain. Negative for constipation, diarrhea, nausea and vomiting.   Genitourinary:  Negative for dysuria, frequency and urgency.   Musculoskeletal:  Negative for back pain, joint swelling and neck pain.   Skin:  Negative for color change, rash and wound.   Neurological:  Negative for dizziness, light-headedness and headaches.         Objective:      Physical Exam  Vitals reviewed.   Constitutional:       General: She is not in acute distress.     Appearance: She is well-developed. She is obese.   HENT:      Head: Normocephalic and atraumatic.      Right Ear: External ear normal.      Left Ear: External ear normal.      Nose: Nose normal.   Eyes:      General:         Right eye: No discharge.         Left eye: No discharge.      Conjunctiva/sclera: Conjunctivae normal.   Neck:      Thyroid: No thyromegaly.   Cardiovascular:      Rate and Rhythm: Normal rate and regular rhythm.   Pulmonary:      Effort: Pulmonary effort is normal. No respiratory distress.      Breath sounds: Normal breath sounds.   Abdominal:      General: There is no distension.      Palpations: Abdomen is soft.      Tenderness: There is abdominal tenderness (mild RUQ). There is  no guarding or rebound.      Hernia: No hernia is present.   Skin:     General: Skin is warm and dry.   Neurological:      Mental Status: She is alert and oriented to person, place, and time.   Psychiatric:         Behavior: Behavior normal.         Thought Content: Thought content normal.         Assessment:       1. RUQ pain    2. Fatty liver        Plan:       1. RUQ pain  Acute  S/p cholecystectomy  Hold GLP-1---> possibly related  Diet modifications  UA and labs per orders  -     US Abdomen Limited; Future; Expected date: 03/06/2024  -     CBC W/ AUTO DIFFERENTIAL; Future; Expected date: 03/06/2024  -     Comprehensive Metabolic Panel; Future; Expected date: 03/06/2024    2. Fatty liver  Chronic stable  Follows with hepatology  Weight loss  Follow labs       RTC PRN pending above work up

## 2024-03-17 ENCOUNTER — PATIENT MESSAGE (OUTPATIENT)
Dept: INTERNAL MEDICINE | Facility: CLINIC | Age: 44
End: 2024-03-17
Payer: COMMERCIAL

## 2024-03-18 LAB — NONINV COLON CA DNA+OCC BLD SCRN STL QL: NEGATIVE

## 2024-05-09 ENCOUNTER — TELEPHONE (OUTPATIENT)
Dept: ELECTROPHYSIOLOGY | Facility: CLINIC | Age: 44
End: 2024-05-09
Payer: COMMERCIAL

## 2024-05-09 NOTE — TELEPHONE ENCOUNTER
Called patient to reschedule her appointment with Dr. Martínez from 5/10/24. Patient agreed to come to [facility] on 5/30/24.

## 2024-06-24 ENCOUNTER — PATIENT MESSAGE (OUTPATIENT)
Dept: PSYCHIATRY | Facility: CLINIC | Age: 44
End: 2024-06-24
Payer: COMMERCIAL

## 2024-08-07 ENCOUNTER — TELEPHONE (OUTPATIENT)
Dept: ELECTROPHYSIOLOGY | Facility: CLINIC | Age: 44
End: 2024-08-07
Payer: COMMERCIAL

## 2024-08-08 ENCOUNTER — OFFICE VISIT (OUTPATIENT)
Dept: ELECTROPHYSIOLOGY | Facility: CLINIC | Age: 44
End: 2024-08-08
Payer: COMMERCIAL

## 2024-08-08 VITALS
HEART RATE: 71 BPM | SYSTOLIC BLOOD PRESSURE: 128 MMHG | WEIGHT: 212 LBS | DIASTOLIC BLOOD PRESSURE: 80 MMHG | BODY MASS INDEX: 36.19 KG/M2 | HEIGHT: 64 IN

## 2024-08-08 DIAGNOSIS — G47.33 OSA (OBSTRUCTIVE SLEEP APNEA): ICD-10-CM

## 2024-08-08 DIAGNOSIS — E66.01 CLASS 2 SEVERE OBESITY DUE TO EXCESS CALORIES WITH SERIOUS COMORBIDITY AND BODY MASS INDEX (BMI) OF 36.0 TO 36.9 IN ADULT: ICD-10-CM

## 2024-08-08 DIAGNOSIS — I10 ESSENTIAL HYPERTENSION: Chronic | ICD-10-CM

## 2024-08-08 DIAGNOSIS — R00.2 PALPITATIONS: ICD-10-CM

## 2024-08-08 DIAGNOSIS — I49.1 PREMATURE ATRIAL CONTRACTIONS: Chronic | ICD-10-CM

## 2024-08-08 DIAGNOSIS — I47.29 NONSUSTAINED VENTRICULAR TACHYCARDIA: Primary | ICD-10-CM

## 2024-08-08 PROBLEM — E66.812 CLASS 2 SEVERE OBESITY DUE TO EXCESS CALORIES WITH SERIOUS COMORBIDITY AND BODY MASS INDEX (BMI) OF 36.0 TO 36.9 IN ADULT: Status: ACTIVE | Noted: 2024-08-08

## 2024-08-08 LAB
OHS QRS DURATION: 90 MS
OHS QTC CALCULATION: 423 MS

## 2024-08-08 PROCEDURE — 1159F MED LIST DOCD IN RCRD: CPT | Mod: CPTII,S$GLB,, | Performed by: INTERNAL MEDICINE

## 2024-08-08 PROCEDURE — 93010 ELECTROCARDIOGRAM REPORT: CPT | Mod: S$GLB,,, | Performed by: INTERNAL MEDICINE

## 2024-08-08 PROCEDURE — 3079F DIAST BP 80-89 MM HG: CPT | Mod: CPTII,S$GLB,, | Performed by: INTERNAL MEDICINE

## 2024-08-08 PROCEDURE — 99999 PR PBB SHADOW E&M-EST. PATIENT-LVL III: CPT | Mod: PBBFAC,,, | Performed by: INTERNAL MEDICINE

## 2024-08-08 PROCEDURE — 3008F BODY MASS INDEX DOCD: CPT | Mod: CPTII,S$GLB,, | Performed by: INTERNAL MEDICINE

## 2024-08-08 PROCEDURE — 1160F RVW MEDS BY RX/DR IN RCRD: CPT | Mod: CPTII,S$GLB,, | Performed by: INTERNAL MEDICINE

## 2024-08-08 PROCEDURE — 99213 OFFICE O/P EST LOW 20 MIN: CPT | Mod: S$GLB,,, | Performed by: INTERNAL MEDICINE

## 2024-08-08 PROCEDURE — 3074F SYST BP LT 130 MM HG: CPT | Mod: CPTII,S$GLB,, | Performed by: INTERNAL MEDICINE

## 2024-08-08 PROCEDURE — 93005 ELECTROCARDIOGRAM TRACING: CPT | Mod: S$GLB,,, | Performed by: INTERNAL MEDICINE

## 2024-08-08 RX ORDER — METOPROLOL SUCCINATE 25 MG/1
25 TABLET, EXTENDED RELEASE ORAL DAILY
Qty: 90 TABLET | Refills: 3 | Status: SHIPPED | OUTPATIENT
Start: 2024-08-08

## 2024-09-04 ENCOUNTER — OFFICE VISIT (OUTPATIENT)
Dept: PSYCHIATRY | Facility: CLINIC | Age: 44
End: 2024-09-04
Payer: COMMERCIAL

## 2024-09-04 VITALS
HEART RATE: 72 BPM | DIASTOLIC BLOOD PRESSURE: 60 MMHG | BODY MASS INDEX: 36.14 KG/M2 | SYSTOLIC BLOOD PRESSURE: 129 MMHG | WEIGHT: 210.56 LBS

## 2024-09-04 DIAGNOSIS — F41.1 GAD (GENERALIZED ANXIETY DISORDER): Primary | ICD-10-CM

## 2024-09-04 DIAGNOSIS — F40.243 ANXIETY WITH FLYING: ICD-10-CM

## 2024-09-04 DIAGNOSIS — F33.42 RECURRENT MAJOR DEPRESSIVE DISORDER, IN FULL REMISSION: ICD-10-CM

## 2024-09-04 DIAGNOSIS — F40.240 CLAUSTROPHOBIA: ICD-10-CM

## 2024-09-04 DIAGNOSIS — F95.2 TOURETTE'S SYNDROME: ICD-10-CM

## 2024-09-04 DIAGNOSIS — F42.8 OBSESSIVE THINKING: ICD-10-CM

## 2024-09-04 PROCEDURE — 3008F BODY MASS INDEX DOCD: CPT | Mod: CPTII,S$GLB,, | Performed by: INTERNAL MEDICINE

## 2024-09-04 PROCEDURE — 1159F MED LIST DOCD IN RCRD: CPT | Mod: CPTII,S$GLB,, | Performed by: INTERNAL MEDICINE

## 2024-09-04 PROCEDURE — 3074F SYST BP LT 130 MM HG: CPT | Mod: CPTII,S$GLB,, | Performed by: INTERNAL MEDICINE

## 2024-09-04 PROCEDURE — 3078F DIAST BP <80 MM HG: CPT | Mod: CPTII,S$GLB,, | Performed by: INTERNAL MEDICINE

## 2024-09-04 PROCEDURE — 99999 PR PBB SHADOW E&M-EST. PATIENT-LVL III: CPT | Mod: PBBFAC,,, | Performed by: INTERNAL MEDICINE

## 2024-09-04 PROCEDURE — 99214 OFFICE O/P EST MOD 30 MIN: CPT | Mod: S$GLB,,, | Performed by: INTERNAL MEDICINE

## 2024-09-04 PROCEDURE — 1160F RVW MEDS BY RX/DR IN RCRD: CPT | Mod: CPTII,S$GLB,, | Performed by: INTERNAL MEDICINE

## 2024-09-04 RX ORDER — ALPRAZOLAM 0.25 MG/1
0.25 TABLET ORAL NIGHTLY PRN
Qty: 30 TABLET | Refills: 0 | Status: SHIPPED | OUTPATIENT
Start: 2024-09-04

## 2024-09-04 RX ORDER — ESCITALOPRAM OXALATE 20 MG/1
20 TABLET ORAL DAILY
Qty: 30 TABLET | Refills: 11 | Status: SHIPPED | OUTPATIENT
Start: 2024-09-04

## 2024-09-04 NOTE — PROGRESS NOTES
OUTPATIENT PSYCHIATRY RETURN VISIT    ENCOUNTER DATE:  9/4/2024  SITE:  [facility]  LENGTH OF SESSION:  25 minutes    CHIEF COMPLAINT:  Anxiety      HISTORY OF PRESENTING ILLNESS:  Zo Martínez is a 44 y.o. female with history of Depression, Anxiety, Insomnia, and Tourette's who presents for follow up appointment.     Plan at last appointment on 10/2/2023:  Symptoms stable.  Continue Lexapro 10mg daily.    Continue Xanax 0.25mg PRN flying anxiety and Trazodone 50mg qHS PRN insomnia.  Discussed with patient informed consent, risks versus benefits, alternative treatments, side effect profile and the inherent unpredictability of individual responses to these treatments.  The patient expresses understanding of the above and displays the capacity to agree with this current plan.    History as told by patient:  Doing well mood wise.  Does wake up sometimes in the middle of the night and her mind starts thinking and feels anxious but not panic attack.  Starts feeling nauseated, mind is racing.  Happens weekly.  Does not drink alcohol at all.  Mind feels like she is about to die.  Has a really bad death anxiety.  That is her main anxiety trigger.  Does not want to leave her children - 11 y/o daughter with Down Syndrome and 7 y/o son.  Today driving in the rain - what if I get in a car accident and die today?  Doesn't have panic attacks anymore but has anxious thoughts all day.  Lexapro 10mg helps prevent her panic attacks - used to be really really bad.  But still has worrying, catastrophizing, especially about death.  She is working out regularly, so this helps.  Still on Mounjaro generic.  Denies depression.  Tourrette's symptoms are twitching nose and sniffing.      Medication side effects:  As above  Medication compliance:  Yes    PSYCHIATRIC REVIEW OF SYSTEMS:  Trouble with sleep:  Yes, as above  Appetite changes:  Denies  Weight changes:  Loss on Mounjaro - highest weight was 240, now  "210  Lack of energy:  Denies  Anhedonia:  Denies  Somatic symptoms:  Denies  Libido:  Decreased sex drive as above  Anxiety/panic:  Yes as above  Guilty/hopeless:  Denies  Self-injurious behavior/risky behavior:  Denies  Any drugs:  Denies  Alcohol:  Denies    MEDICAL REVIEW OF SYSTEMS:  Complete review of systems performed covering Constitutional, Musculoskeletal, Neurologic.  All systems negative except for that covered in HPI.    PAST PSYCHIATRIC, MEDICAL, AND SOCIAL HISTORY REVIEWED  The patient's past medical, family and social history have been reviewed and updated as appropriate within the electronic medical record - see encounter notes.    MEDICATIONS:    Current Outpatient Medications:     ALPRAZolam (XANAX) 0.25 MG tablet, Take 1 tablet (0.25 mg total) by mouth nightly as needed for Anxiety., Disp: 30 tablet, Rfl: 0    EScitalopram oxalate (LEXAPRO) 20 MG tablet, Take 1 tablet (20 mg total) by mouth once daily., Disp: 30 tablet, Rfl: 11    metoprolol succinate (TOPROL-XL) 25 MG 24 hr tablet, Take 1 tablet (25 mg total) by mouth once daily., Disp: 90 tablet, Rfl: 3    SEMAGLUTIDE, WEIGHT LOSS, SUBQ, Inject into the skin. Compounded version, Disp: , Rfl:     spironolactone (ALDACTONE) 100 MG tablet, Take 100 mg by mouth., Disp: , Rfl:     traZODone (DESYREL) 50 MG tablet, Take 1-2 tablets ( mg total) by mouth nightly as needed for Insomnia., Disp: 60 tablet, Rfl: 3    ALLERGIES:  Review of patient's allergies indicates:  No Known Allergies    PSYCHIATRIC EXAM:  Vitals:    09/04/24 1519   BP: 129/60   Pulse: 72   Weight: 95.5 kg (210 lb 8.6 oz)     Appearance:  Well groomed, appearing healthy and of stated age  Behavior:  Cooperative, pleasant, no psychomotor agitation or retardation  Speech:  Normal rate, rhythm, prosody, and volume  Mood:  "Good"  Affect:  Euthymic  Thought Process:  Linear, logical, goal directed  Thought Content:  Negative for suicidal ideation, homicidal ideation, delusions or " hallucinations.  Associations:  Intact  Memory:  Grossly Intact  Level of Consciousness/Orientation:  Grossly intact  Fund of Knowledge:  Good  Attention:  Good  Language:  Fluent, able to name abstract and concrete objects  Insight:  Good  Judgment:  Intact  Psychomotor signs:  No involuntary movements or tremor  Gait:  Normal      RELEVANT LABS/STUDIES:    Lab Results   Component Value Date    WBC 8.43 03/06/2024    HGB 12.8 03/06/2024    HCT 41.9 03/06/2024    MCV 84 03/06/2024     03/06/2024     BMP  Lab Results   Component Value Date     03/06/2024    K 4.6 03/06/2024     03/06/2024    CO2 27 03/06/2024    BUN 17 03/06/2024    CREATININE 0.9 03/06/2024    CALCIUM 9.3 03/06/2024    ANIONGAP 7 (L) 03/06/2024    ESTGFRAFRICA >60 10/12/2020    EGFRNONAA >60 10/12/2020     Lab Results   Component Value Date    ALT 18 03/06/2024    AST 18 03/06/2024    GGT 72 (H) 08/06/2019    ALKPHOS 119 03/06/2024    BILITOT 0.3 03/06/2024     Lab Results   Component Value Date    TSH 0.84 09/06/2022     Lab Results   Component Value Date    HGBA1C 6.0 (H) 08/23/2022       IMPRESSION:    Zo Martínez is a 44 y.o. female with history of Depression, Anxiety, Insomnia, and Tourette's who presents for follow up appointment.    Status/Progress:  Based on the examination today, the patient's problem(s) is/are inadequately controlled.  New problems have not been presented today.    Risk Parameters:  Patient reports no suicidal ideation  Patient reports no homicidal ideation  Patient reports no self-injurious behavior  Patient reports no violent behavior      DIAGNOSES:    ICD-10-CM ICD-9-CM   1. SKYE (generalized anxiety disorder)  F41.1 300.02   2. Obsessive thinking  F42.8 300.3   3. Anxiety with flying  F40.243 300.29   4. Claustrophobia  F40.240 300.29   5. Recurrent major depressive disorder, in full remission  F33.42 296.36   6. Tourette's syndrome  F95.2 307.23       PLAN:  Increase Lexapro to 20mg daily for  residual anxiety.  She will monitor weight closely and let me know if she gains weight.  If this occurs, will reduce back down to 10mg daily and add Buspar.    Continue Xanax 0.25mg PRN flying anxiety and Trazodone 50mg qHS PRN insomnia (rarely takes).  Discussed with patient informed consent, risks versus benefits, alternative treatments, side effect profile and the inherent unpredictability of individual responses to these treatments.  The patient expresses understanding of the above and displays the capacity to agree with this current plan.    RETURN TO CLINIC:  Follow up in about 3 months (around 12/4/2024).

## 2024-10-16 ENCOUNTER — TELEPHONE (OUTPATIENT)
Dept: HEPATOLOGY | Facility: CLINIC | Age: 44
End: 2024-10-16
Payer: COMMERCIAL

## 2024-10-16 DIAGNOSIS — K74.01 HEPATIC FIBROSIS, EARLY FIBROSIS: ICD-10-CM

## 2024-10-16 DIAGNOSIS — K76.0 FATTY LIVER: Primary | ICD-10-CM

## 2024-10-16 NOTE — TELEPHONE ENCOUNTER
Contacted the patient and informed the patient that provider will not be in the office on January that she was been scheduled.I stated that provider moved her fibroscan and follow up visit on 11/18/2024.Patient voice understanding.

## 2024-10-16 NOTE — TELEPHONE ENCOUNTER
Please notify pt that I am out of the office on the date of Jan appt, so I moved pt to November with all testing before to a similar time. If that doesn't work, please help pt reschedule appt for any open/available appt with testing before     Thanks !

## 2024-11-18 ENCOUNTER — OFFICE VISIT (OUTPATIENT)
Dept: HEPATOLOGY | Facility: CLINIC | Age: 44
End: 2024-11-18
Payer: COMMERCIAL

## 2024-11-18 ENCOUNTER — PROCEDURE VISIT (OUTPATIENT)
Dept: HEPATOLOGY | Facility: CLINIC | Age: 44
End: 2024-11-18
Payer: COMMERCIAL

## 2024-11-18 ENCOUNTER — PATIENT MESSAGE (OUTPATIENT)
Dept: HEPATOLOGY | Facility: CLINIC | Age: 44
End: 2024-11-18

## 2024-11-18 VITALS — HEIGHT: 64 IN | WEIGHT: 212.5 LBS | BODY MASS INDEX: 36.28 KG/M2

## 2024-11-18 DIAGNOSIS — E66.01 CLASS 2 SEVERE OBESITY DUE TO EXCESS CALORIES WITH SERIOUS COMORBIDITY AND BODY MASS INDEX (BMI) OF 36.0 TO 36.9 IN ADULT: ICD-10-CM

## 2024-11-18 DIAGNOSIS — K74.01 HEPATIC FIBROSIS, EARLY FIBROSIS: ICD-10-CM

## 2024-11-18 DIAGNOSIS — I10 ESSENTIAL HYPERTENSION: Chronic | ICD-10-CM

## 2024-11-18 DIAGNOSIS — K76.0 FATTY LIVER: ICD-10-CM

## 2024-11-18 DIAGNOSIS — E66.812 CLASS 2 SEVERE OBESITY DUE TO EXCESS CALORIES WITH SERIOUS COMORBIDITY AND BODY MASS INDEX (BMI) OF 36.0 TO 36.9 IN ADULT: ICD-10-CM

## 2024-11-18 DIAGNOSIS — R73.03 PRE-DIABETES: ICD-10-CM

## 2024-11-18 DIAGNOSIS — K76.0 METABOLIC DYSFUNCTION-ASSOCIATED STEATOTIC LIVER DISEASE (MASLD): Primary | ICD-10-CM

## 2024-11-18 PROBLEM — E66.09 CLASS 2 OBESITY DUE TO EXCESS CALORIES WITH BODY MASS INDEX (BMI) OF 36.0 TO 36.9 IN ADULT: Status: RESOLVED | Noted: 2017-09-19 | Resolved: 2024-11-18

## 2024-11-18 PROCEDURE — 99999 PR PBB SHADOW E&M-EST. PATIENT-LVL III: CPT | Mod: PBBFAC,,, | Performed by: NURSE PRACTITIONER

## 2024-11-18 PROCEDURE — 3008F BODY MASS INDEX DOCD: CPT | Mod: CPTII,S$GLB,, | Performed by: NURSE PRACTITIONER

## 2024-11-18 PROCEDURE — 99214 OFFICE O/P EST MOD 30 MIN: CPT | Mod: S$GLB,,, | Performed by: NURSE PRACTITIONER

## 2024-11-18 PROCEDURE — 1160F RVW MEDS BY RX/DR IN RCRD: CPT | Mod: CPTII,S$GLB,, | Performed by: NURSE PRACTITIONER

## 2024-11-18 PROCEDURE — 91200 LIVER ELASTOGRAPHY: CPT | Mod: S$GLB,,, | Performed by: NURSE PRACTITIONER

## 2024-11-18 PROCEDURE — 1159F MED LIST DOCD IN RCRD: CPT | Mod: CPTII,S$GLB,, | Performed by: NURSE PRACTITIONER

## 2024-11-18 PROCEDURE — 3044F HG A1C LEVEL LT 7.0%: CPT | Mod: CPTII,S$GLB,, | Performed by: NURSE PRACTITIONER

## 2024-11-18 RX ORDER — MEDROXYPROGESTERONE ACETATE 10 MG/1
10 TABLET ORAL
COMMUNITY
Start: 2024-11-06

## 2024-11-18 NOTE — PROGRESS NOTES
[facility] Established Patient Visit    Reason for Visit:  Metabolic associated steatotic liver disease     PCP: Velvet Johansen    HPI:  This is a 44 y.o. female with PMH noted below, here for follow up of above    Serological workup was negative for Marco's, alpha-1 antitrypsin deficiency, hemochromatosis, autoimmune etiology, and viral hepatitis A, B and C    Prior serologic workup:   Lab Results   Component Value Date    SMOOTHMUSCAB Negative 1:40 08/30/2019    AMAIFA Negative 1:40 08/30/2019    IGGSERUM 1363 08/30/2019    ANASCREEN Negative <1:160 08/30/2019    FERRITIN 87 08/30/2019    FESATURATED 10 (L) 08/30/2019    PETH Negative 09/06/2019    CERULOPLSM 35.0 08/30/2019    HEPBSAG Negative 08/16/2019    HEPCAB Negative 08/16/2019    HEPAIGM Negative 08/16/2019     Risk factors for NAFLD include obesity, HTN, pre-DM    Liver fibrosis staging:  -- fibroscan 9/2021 noted F3, S3 (kPA 10.2, )  -- FIbroscan 8/2022 noted F2, S3 (kPA 9, )  -- MRI elasto 9/2023 noted F0, severe steatosis   -- fibroscan 10/2023 noted F1, S2 (kPA 7.3, )  -- fibroscan 11/2024 noted F0, S3 (kPA 6, )    Interval HPI: Presents today alone.  Previous wt 199 lbs, currently 212, highest wt 232 lbs    on compounded Semaglutide, A1c 6%  GLP1 previously was not covered by insurance       Labs done 10/2024 showed mildly elevated transaminase levels (previous intermittent elevation since 2012)  Platelets and alk phos WNL  Synthetic liver functioning WNL    Lab Results   Component Value Date    ALT 31 10/28/2024    AST 37 (H) 10/28/2024    ALKPHOS 128 10/28/2024    BILITOT 0.4 10/28/2024    ALBUMIN 4.4 10/28/2024    INR 0.9 12/13/2019     03/06/2024     Abd US done 10/2024 showed fatty liver. No splenomegaly    Denies family history of liver disease . Denies alcohol consumption currently or in the past    Immunity to Hep A and B - needs [employer], previously sent to [facility] pharm and ID      PMHX:  has a  past medical history of Anxiety, Family history of breast cancer in mother (4/18/2017), Family history of colon cancer: father age 55 (4/18/2017), Fatty liver (9/6/2019), Gestational diabetes mellitus, Obesity, Class I, BMI 30-34.9, Pre-diabetes (4/18/2017), Severe obesity (BMI 35.0-35.9 with comorbidity) (9/19/2017), Tourette's syndrome, and Transient elevated blood pressure.    PSHX:  has a past surgical history that includes Cholecystectomy; Dilation and curettage of uterus; and Eye surgery (Bilateral, 11/2022).    The patient's social and family histories were reviewed by me and updated in the appropriate section of the electronic medical record.    Review of patient's allergies indicates:  No Known Allergies    Current Outpatient Medications on File Prior to Visit   Medication Sig Dispense Refill    ALPRAZolam (XANAX) 0.25 MG tablet Take 1 tablet (0.25 mg total) by mouth nightly as needed for Anxiety. 30 tablet 0    EScitalopram oxalate (LEXAPRO) 20 MG tablet Take 1 tablet (20 mg total) by mouth once daily. 30 tablet 11    medroxyPROGESTERone (PROVERA) 10 MG tablet Take 10 mg by mouth.      metoprolol succinate (TOPROL-XL) 25 MG 24 hr tablet Take 1 tablet (25 mg total) by mouth once daily. 90 tablet 3    SEMAGLUTIDE, WEIGHT LOSS, SUBQ Inject into the skin. Compounded version      traZODone (DESYREL) 50 MG tablet Take 1-2 tablets ( mg total) by mouth nightly as needed for Insomnia. 60 tablet 3    spironolactone (ALDACTONE) 100 MG tablet Take 100 mg by mouth.       No current facility-administered medications on file prior to visit.       SOCIAL HISTORY:   Social History     Substance and Sexual Activity   Alcohol Use Yes    Comment: rarely, socially, few times a year max        Social History     Substance and Sexual Activity   Drug Use No       ROS:   GENERAL: Denies fatigue  CARDIOVASCULAR: Denies edema  GI: Denies abdominal pain  SKIN: Denies rash, itching   NEURO: Denies confusion, memory loss, or mood  "changes    Objective Findings:    PHYSICAL EXAM:   Friendly White female, in no acute distress; alert and oriented to person, place and time  VITALS: Ht 5' 4" (1.626 m)   Wt 96.4 kg (212 lb 8.4 oz)   BMI 36.48 kg/m²   EYES: Sclerae anicteric  GI: Soft, non-tender, non-distended. No ascites.  EXTREMITIES:  No edema.  SKIN: Warm and dry. No jaundice. No telangectasias noted. No palmar erythema.  NEURO:  No asterixis.  PSYCH:  Thought and speech pattern appropriate. Behavior normal      EDUCATION:  See instructions discussed with patient in Instructions section of the After Visit Summary     ASSESSMENT & PLAN:  44 y.o. White female with:  1.   Metabolic associated steatotic liver disease   -- steatosis increasing on fibroscan with some weight gain, likely would benefit from higher GLP-1 dose, message sent to PCP to inquire per pt request  - see HPI  -- fibroscan 11/2024 noted F0, S3 - will repeat yearly   -- Immunity to Hep A and B : see HPI  -- Recommendations discussed with patient:  Limit alcohol consumption  2 Weight loss of ~25 lbs   3. Low carb/sugar, high fiber and protein diet, limit your carb intake to LESS than 30-45 grams of carbs with a meal or LESS than 5-10 grams with any snack   4. Exercise, 5 days per week, 30 minutes per day, as tolerated  5. Recommend good cholesterol, blood pressure, blood sugar levels   6. No indication for Rezdiffra currently, no fibrosis     3. Elevated liver enzymes  -- likely related to fatty liver given normalization with weight loss and previous sero w/u negative     4. Obesity, HTN, preDM   -- Body mass index is 36.48 kg/m².   -- increases risk for fatty liver          Follow up in about 1 year (around 11/18/2025).  With fibroscan before   Orders Placed This Encounter   Procedures    FibroScan Transplant Hepatology(Vibration Controlled Transient Elastography)          Thank you for allowing me to participate in the care of Zo Haskins, SABINE    I " spent a total of 30 minutes on the day of the visit.This includes face to face time and non-face to face time preparing to see the patient (eg, review of tests), obtaining and/or reviewing separately obtained history, documenting clinical information in the electronic or other health record, independently interpreting results and communicating results to the patient/family/caregiver, and coordinating care.       CC'ed note to:   Velvet Johansen MD

## 2024-11-18 NOTE — PROCEDURES
FibroScan Transplant Hepatology(Vibration Controlled Transient Elastography)    Date/Time: 11/18/2024 8:30 AM    Performed by: Paola Haskins NP  Authorized by: Paola Haskins NP    Diagnosis:  NAFLD    Probe:  XL    Universal Protocol: Patient's identity, procedure and site were verified, confirmatory pause was performed.  Discussed procedure including risks and potential complications.  Questions answered.  Patient verbalizes understanding and wishes to proceed with VCTE.     Procedure: After providing explanations of the procedure, patient was placed in the supine position with right arm in maximum abduction to allow optimal exposure of right lateral abdomen.  Patient was briefly assessed, Testing was performed in the mid-axillary location, 50Hz Shear Wave pulses were applied and the resulting Shear Wave and Propagation Speed detected with a 3.5 MHz ultrasonic signal, using the FibroScan probe, Skin to liver capsule distance and liver parenchyma were accessed during the entire examination with the FibroScan probe, Patient was instructed to breathe normally and to abstain from sudden movements during the procedure, allowing for random measurements of liver stiffness. At least 10 Shear Waves were produced, Individual measurements of each Shear Wave were calculated.  Patient tolerated the procedure well with no complications.  Meets discharge criteria as was dismissed.  Rates pain 0 out of 10.  Patient will follow up with ordering provider to review results.    Findings  Median liver stiffness score:  6  CAP Reading: dB/m:  292    IQR/med %:  8  Interpretation  Fibrosis interpretation is based on medial liver stiffness - Kilopascal (kPa).    Fibrosis Stage:  F 0-1  Steatosis interpretation is based on controlled attenuation parameter - (dB/m).    Steatosis Grade:  S3

## 2024-11-18 NOTE — Clinical Note
Hi Dr. Johansen: her fatty liver percentage was a little higher this year because her weight is increasing again. She is interested in a higher dose of GLP-1 but on the past her insurance did not cover it so she is using a weight loss clinic who compounds it but is having financial constraints. Do you prescribe GLP-1 to compound pharmacies? Thanks in advance!

## 2024-11-18 NOTE — PATIENT INSTRUCTIONS
1. Fibroscan to look for fat or scar tissue in the liver showed ~70% fat in the liver, no scar tissue  2.  Follow up in 1 year with fibroscan before    These things are important to improve fatty liver:  Limit alcohol consumption  2 Weight loss goal of 25 lbs. [facility] offers benefits to patients so let me know if you want a referral to the [facility] gym. Also, [facility] has dieticians that can create a weight loss plan. If you are interested let me know and I can place a referral. If so, contact the dietician team at [facility] at email [email or web address] or call [phone].  to get scheduled. They do offer video visits   3. Avoid processed foods. No fried/fast foods. No sugary drinks or drinks with any calories.   4. Low carb/sugar and high protein diet (100 grams per day of protein).Try to limit your carb intake to LESS than  grams per day total. Use MyFitness Pal or Lose It josh to add up your carbs through the day. Do NOT drink any beverages with calories or carbs (this can lead to high blood sugar and weight gain). The main thing to focus on is high protein, low carb)  5. Exercise, 5 days per week, 30 minutes per day, as tolerated  6. Recommend good cholesterol, blood pressure, blood sugar levels   7. There is a new medication called Rezdiffra for the treatment of F2-F3 liver scarring due to fatty liver. It is only indicated for patients with significant scar tissue from fatty liver (not you currently)    In some people, fatty liver can progress to steatohepatitis (inflamatory fatty liver) and possibly to cirrhosis, increasing the risk for liver cancer, liver failure, and death. Therefore, the lifestyle changes are very important to decrease this risk.     Helpful website for MAS/fatty liver: [email or web address]

## 2024-11-18 NOTE — Clinical Note
Hey! Can you see her for weight loss? I forgot if you had an age minimum or not? She wanted to try and see if her insurance would cover it again (last year it didn't) or if not get it compounded. She is at an outside weight loss company so she's already on it but its getting too expensive for her. Thanks in advance!

## 2025-01-21 ENCOUNTER — OFFICE VISIT (OUTPATIENT)
Dept: OBSTETRICS AND GYNECOLOGY | Facility: CLINIC | Age: 45
End: 2025-01-21
Payer: COMMERCIAL

## 2025-01-21 VITALS — HEIGHT: 64 IN | BODY MASS INDEX: 37.22 KG/M2 | WEIGHT: 218 LBS

## 2025-01-21 DIAGNOSIS — K76.0 METABOLIC DYSFUNCTION-ASSOCIATED STEATOTIC LIVER DISEASE (MASLD): Primary | ICD-10-CM

## 2025-01-21 DIAGNOSIS — E66.9 OBESITY, UNSPECIFIED CLASS, UNSPECIFIED OBESITY TYPE, UNSPECIFIED WHETHER SERIOUS COMORBIDITY PRESENT: ICD-10-CM

## 2025-01-21 DIAGNOSIS — R73.03 PRE-DIABETES: ICD-10-CM

## 2025-01-21 PROCEDURE — 1159F MED LIST DOCD IN RCRD: CPT | Mod: CPTII,95,, | Performed by: PHYSICIAN ASSISTANT

## 2025-01-21 PROCEDURE — 98006 SYNCH AUDIO-VIDEO EST MOD 30: CPT | Mod: 95,,, | Performed by: PHYSICIAN ASSISTANT

## 2025-01-21 PROCEDURE — 1160F RVW MEDS BY RX/DR IN RCRD: CPT | Mod: CPTII,95,, | Performed by: PHYSICIAN ASSISTANT

## 2025-01-21 PROCEDURE — 3008F BODY MASS INDEX DOCD: CPT | Mod: CPTII,95,, | Performed by: PHYSICIAN ASSISTANT

## 2025-01-21 RX ORDER — SEMAGLUTIDE 0.68 MG/ML
0.25 INJECTION, SOLUTION SUBCUTANEOUS
Qty: 3 ML | Refills: 3 | Status: SHIPPED | OUTPATIENT
Start: 2025-01-21

## 2025-01-24 ENCOUNTER — PATIENT MESSAGE (OUTPATIENT)
Dept: OBSTETRICS AND GYNECOLOGY | Facility: CLINIC | Age: 45
End: 2025-01-24
Payer: COMMERCIAL

## 2025-02-17 ENCOUNTER — PATIENT MESSAGE (OUTPATIENT)
Dept: ADMINISTRATIVE | Facility: HOSPITAL | Age: 45
End: 2025-02-17
Payer: COMMERCIAL

## 2025-02-18 ENCOUNTER — PATIENT MESSAGE (OUTPATIENT)
Dept: OBSTETRICS AND GYNECOLOGY | Facility: CLINIC | Age: 45
End: 2025-02-18
Payer: COMMERCIAL

## 2025-02-18 DIAGNOSIS — R73.03 PRE-DIABETES: Primary | ICD-10-CM

## 2025-02-18 DIAGNOSIS — E66.9 OBESITY, UNSPECIFIED CLASS, UNSPECIFIED OBESITY TYPE, UNSPECIFIED WHETHER SERIOUS COMORBIDITY PRESENT: ICD-10-CM

## 2025-02-24 ENCOUNTER — OFFICE VISIT (OUTPATIENT)
Dept: INTERNAL MEDICINE | Facility: CLINIC | Age: 45
End: 2025-02-24
Payer: COMMERCIAL

## 2025-02-24 VITALS
OXYGEN SATURATION: 98 % | BODY MASS INDEX: 38.69 KG/M2 | RESPIRATION RATE: 18 BRPM | SYSTOLIC BLOOD PRESSURE: 138 MMHG | HEIGHT: 64 IN | HEART RATE: 88 BPM | DIASTOLIC BLOOD PRESSURE: 86 MMHG | WEIGHT: 226.63 LBS

## 2025-02-24 DIAGNOSIS — M54.50 CHRONIC BILATERAL LOW BACK PAIN WITHOUT SCIATICA: Primary | ICD-10-CM

## 2025-02-24 DIAGNOSIS — G89.29 CHRONIC BILATERAL LOW BACK PAIN WITHOUT SCIATICA: Primary | ICD-10-CM

## 2025-02-24 PROCEDURE — 96372 THER/PROPH/DIAG INJ SC/IM: CPT | Mod: S$GLB,,, | Performed by: NURSE PRACTITIONER

## 2025-02-24 PROCEDURE — 99999 PR PBB SHADOW E&M-EST. PATIENT-LVL IV: CPT | Mod: PBBFAC,,, | Performed by: NURSE PRACTITIONER

## 2025-02-24 PROCEDURE — 3008F BODY MASS INDEX DOCD: CPT | Mod: CPTII,S$GLB,, | Performed by: NURSE PRACTITIONER

## 2025-02-24 PROCEDURE — 3075F SYST BP GE 130 - 139MM HG: CPT | Mod: CPTII,S$GLB,, | Performed by: NURSE PRACTITIONER

## 2025-02-24 PROCEDURE — 3079F DIAST BP 80-89 MM HG: CPT | Mod: CPTII,S$GLB,, | Performed by: NURSE PRACTITIONER

## 2025-02-24 PROCEDURE — 1159F MED LIST DOCD IN RCRD: CPT | Mod: CPTII,S$GLB,, | Performed by: NURSE PRACTITIONER

## 2025-02-24 PROCEDURE — 99213 OFFICE O/P EST LOW 20 MIN: CPT | Mod: 25,S$GLB,, | Performed by: NURSE PRACTITIONER

## 2025-02-24 RX ORDER — KETOROLAC TROMETHAMINE 30 MG/ML
30 INJECTION, SOLUTION INTRAMUSCULAR; INTRAVENOUS
Status: COMPLETED | OUTPATIENT
Start: 2025-02-24 | End: 2025-02-24

## 2025-02-24 RX ORDER — TIZANIDINE 4 MG/1
4 TABLET ORAL EVERY 8 HOURS
Qty: 30 TABLET | Refills: 0 | Status: SHIPPED | OUTPATIENT
Start: 2025-02-24 | End: 2025-03-06

## 2025-02-24 RX ORDER — METHYLPREDNISOLONE ACETATE 80 MG/ML
80 INJECTION, SUSPENSION INTRA-ARTICULAR; INTRALESIONAL; INTRAMUSCULAR; SOFT TISSUE
Status: COMPLETED | OUTPATIENT
Start: 2025-02-24 | End: 2025-02-24

## 2025-02-24 RX ADMIN — KETOROLAC TROMETHAMINE 30 MG: 30 INJECTION, SOLUTION INTRAMUSCULAR; INTRAVENOUS at 11:02

## 2025-02-24 RX ADMIN — METHYLPREDNISOLONE ACETATE 80 MG: 80 INJECTION, SUSPENSION INTRA-ARTICULAR; INTRALESIONAL; INTRAMUSCULAR; SOFT TISSUE at 11:02

## 2025-02-24 NOTE — PROGRESS NOTES
Subjective:       Patient ID: Zo Martínez is a 45 y.o. female.    Chief Complaint: Low-back Pain      History of Present Illness    CHIEF COMPLAINT:  Zo presents today for back pain that started on Saturday    BACK PAIN:  She reports acute lower back pain with a history of intermittent episodes that typically result in 5-7 days of incapacitation. She experiences chronic back tightness, particularly when rising from a seated position and reports a tightening sensation when lifting legs without associated pain. Pain is not located along the spine. Prior to COVID, she was evaluated by a back specialist after feeling a pop in her back, but ordered MRI was not completed before orders . She has a history of sciatic pain but denies current sciatic symptoms. She has not received physical therapy or chiropractic treatment.    MEDICATIONS:  She is currently using a pain patch without significant benefit. Over-the-counter pain medications and previously prescribed methocarbamol provided minimal relief from urgent care    MEDICAL HISTORY:  She has a history of slightly elevated blood sugar.    ALLERGIES:  No known allergies.      ROS:  Musculoskeletal: +back pain, -muscle pain          Objective:      Physical Exam    General: No acute distress. Well-developed. Well-nourished.  Eyes: EOMI. Sclerae anicteric.  HENT: Normocephalic. Atraumatic. Nares patent. Moist oral mucosa.  Cardiovascular: Regular rate. Regular rhythm. No murmurs. No rubs. No gallops. Normal S1, S2.  Respiratory: Normal respiratory effort. Clear to auscultation bilaterally. No rales. No rhonchi. No wheezing.  Abdomen: Soft. Non-tender. Non-distended. Normoactive bowel sounds.  Musculoskeletal: No  obvious deformity. No step off deformity- sore with palp to lower back flank areas   Extremities: No lower extremity edema.  Neurological: Alert & oriented x3. No slurred speech. Normal gait.  Psychiatric: Normal mood. Normal affect. Good insight.  Good judgment.  Skin: Warm. Dry. No rash.          Assessment:       1. Chronic bilateral low back pain without sciatica        Plan:     Problem List Items Addressed This Visit       Chronic bilateral low back pain without sciatica - Primary    Relevant Medications    tiZANidine (ZANAFLEX) 4 MG tablet    methylPREDNISolone acetate injection 80 mg (Completed)    ketorolac injection 30 mg (Completed)    Other Relevant Orders    Ambulatory Referral/Consult to Physical/Occupational Therapy     Assessment & Plan    IMPRESSION:  - Assessed recurrent back pain, noting history of previous episodes and prior urgent care visits  - Reviewed x-rays from 2022, showing no specific abnormalities  - Determined paraspinal muscle tightness likely due to underlying spinal issue  - Decided on combination therapy: anti-inflammatory, steroid, and muscle relaxer for acute symptom relief  - Recommend physical therapy to address underlying cause and strengthen paraspinal muscles    RECURRENT BACK PAIN:  - Assessed the patient's history of recurrent back pain, with the current episode starting on Saturday and typically lasting 5-7 days.  - Noted the patient experiences back tightness when sitting and getting up from a chair.  - Performed physical exam revealing pain and tightness in the lower back area.  - Observed that the patient can bend forward but experiences soreness and pulling sensation.  - Reviewed x-rays from 2022, which did not show any specific issues.  - Assessed that the back pain is likely due to an issue in the spine causing paraspinal muscle tightness.  - Explained that back pain is often caused by spinal issues leading to paraspinal muscle tightness.  - Discussed the role of physical therapy in strengthening paraspinal muscles to support the spine.  - Administered Toradol injection (anti-inflammatory) in office.  - Administered Medrol injection (steroid) in office.  - Discontinued ibuprofen for today due to Toradol  injection.  - Advised the patient to take acetaminophen if needed for pain.  - Referred the patient to physical therapy at [facility] for back pain management.  - Informed the patient that [facility] will call to schedule physical therapy appointments and provide facility options.  - Noted that the patient experiences muscle tightness in the back when lifting left leg.  - Prescribed zanaflex (muscle relaxer) to be taken every 8 hours, sent to [facility] in [place].  - Instructed the patient to take the medication before bedtime due to potential drowsiness.  - Informed the patient about the potential for medication-induced drowsiness, particularly with muscle relaxers.  - Noted the patient's history of back pain and previous consultations with doctors, including a back specialist.  - Documented the patient's recollection of a previous incident where they felt a pop in their back.             This note was generated with the assistance of ambient listening technology. Verbal consent was obtained by the patient and accompanying visitor(s) for the recording of patient appointment to facilitate this note. I attest to having reviewed and edited the generated note for accuracy, though some syntax or spelling errors may persist. Please contact the author of this note for any clarification.           Answers submitted by the patient for this visit:  Back Pain Questionnaire (Submitted on 2/24/2025)  Chief Complaint: Back pain  Chronicity: recurrent  Onset: more than 1 year ago  Frequency: intermittently  Progression since onset: waxing and waning  Pain location: sacro-iliac  Pain quality: aching, shooting  Radiates to: does not radiate  Pain - numeric: 8/10  Pain is: the same all the time  Aggravated by: bending, position, sitting, standing  Stiffness is present: all day  abdominal pain: No  bladder incontinence: No  bowel incontinence: No  chest pain: No  dysuria: No  fever: No  headaches: No  leg pain: No  numbness: No  paresis:  No  paresthesias: No  pelvic pain: No  perianal numbness: No  tingling: No  weakness: No  weight loss: No  genital pain: No  hematuria: No  Pain severity: moderate  Improvement on treatment: mild

## 2025-03-24 ENCOUNTER — OFFICE VISIT (OUTPATIENT)
Dept: OBSTETRICS AND GYNECOLOGY | Facility: CLINIC | Age: 45
End: 2025-03-24
Payer: COMMERCIAL

## 2025-03-24 VITALS — HEIGHT: 64 IN | WEIGHT: 219 LBS | BODY MASS INDEX: 37.39 KG/M2

## 2025-03-24 DIAGNOSIS — E66.9 OBESITY, UNSPECIFIED CLASS, UNSPECIFIED OBESITY TYPE, UNSPECIFIED WHETHER SERIOUS COMORBIDITY PRESENT: ICD-10-CM

## 2025-03-24 DIAGNOSIS — K76.0 METABOLIC DYSFUNCTION-ASSOCIATED STEATOTIC LIVER DISEASE (MASLD): ICD-10-CM

## 2025-03-24 DIAGNOSIS — R73.03 PRE-DIABETES: Primary | ICD-10-CM

## 2025-03-24 PROCEDURE — 98005 SYNCH AUDIO-VIDEO EST LOW 20: CPT | Mod: 95,,, | Performed by: PHYSICIAN ASSISTANT

## 2025-03-24 PROCEDURE — 3008F BODY MASS INDEX DOCD: CPT | Mod: CPTII,95,, | Performed by: PHYSICIAN ASSISTANT

## 2025-03-24 PROCEDURE — 1159F MED LIST DOCD IN RCRD: CPT | Mod: CPTII,95,, | Performed by: PHYSICIAN ASSISTANT

## 2025-03-24 PROCEDURE — 1160F RVW MEDS BY RX/DR IN RCRD: CPT | Mod: CPTII,95,, | Performed by: PHYSICIAN ASSISTANT

## 2025-03-24 NOTE — PROGRESS NOTES
The patient location is: home  The chief complaint leading to consultation is: follow up weight loss    Visit type: audiovisual    Face to Face time with patient: 10 minutes  15 minutes of total time spent on the encounter, which includes face to face time and non-face to face time preparing to see the patient (eg, review of tests), Obtaining and/or reviewing separately obtained history, Documenting clinical information in the electronic or other health record, Independently interpreting results (not separately reported) and communicating results to the patient/family/caregiver, or Care coordination (not separately reported).         Each patient to whom he or she provides medical services by telemedicine is:  (1) informed of the relationship between the physician and patient and the respective role of any other health care provider with respect to management of the patient; and (2) notified that he or she may decline to receive medical services by telemedicine and may withdraw from such care at any time.    Notes:    Subjective:      Zo Martínez is a 45 y.o. female with history of MASLD  and prediabetes who presents for weight loss follow up. She is taking compounded semaglutide 0.5mg SC weekly. Gained some weight prior to starting the medication was around 222lb. Reports about 3lb weight loss. Tolerating the medication well. Still having increased cravings in the afternoons. Denies skipping meals and eating high protein. Decreased exercise due to back injury and doing PT.    Routine screening labs: 8/2/2023    No visits with results within 3 Month(s) from this visit.   Latest known visit with results is:   Lab Visit on 10/28/2024   Component Date Value Ref Range Status    Total Protein 10/28/2024 8.2  6.3 - 8.2 g/dL Final    Albumin 10/28/2024 4.4  3.5 - 5.2 g/dL Final    Total Bilirubin 10/28/2024 0.4  0.2 - 1.3 mg/dL Final    AST 10/28/2024 37 (H)  14 - 36 U/L Final    ALT 10/28/2024 31  10 - 44 U/L Final     Alkaline Phosphatase 10/28/2024 128  38 - 145 U/L Final    Bilirubin, Direct 10/28/2024 0.2  0.0 - 0.3 mg/dL Final    Hemoglobin A1C 10/28/2024 6.0 (H)  4.0 - 5.6 % Final       Past Medical History:   Diagnosis Date    Anxiety     Family history of breast cancer in mother 2017    Family history of colon cancer: father age 55 2017    Fatty liver 2019    Gestational diabetes mellitus     Obesity, Class I, BMI 30-34.9     Pre-diabetes 2017    Severe obesity (BMI 35.0-35.9 with comorbidity) 2017    Tourette's syndrome     x- have subsided    Transient elevated blood pressure      Past Surgical History:   Procedure Laterality Date    CHOLECYSTECTOMY      DILATION AND CURETTAGE OF UTERUS      EYE SURGERY Bilateral 2022    lasik     Social History[1]  Family History   Problem Relation Name Age of Onset    Heart disease Mother          stenting    Diabetes Mother      Cancer Mother  63        breast, also vaginal    Hypertension Mother      Hyperlipidemia Mother      Breast cancer Mother      Heart disease Father          CHF    Hypertension Father      Cancer Father          colon    Diabetes Sister Manav     Heart disease Sister Manav         stents    Diabetes Sister Alexandra     Heart disease Daughter Dayna         Tetralogy    Down syndrome Daughter Dayna     Breast cancer Maternal Aunt      Liver cancer Maternal Uncle      Stroke Maternal Grandmother      Heart disease Maternal Grandfather      Heart disease Paternal Grandmother          MI    Diabetes Brother      No Known Problems Son Seamus     Cirrhosis Neg Hx       OB History    Para Term  AB Living   2 2 2      SAB IAB Ectopic Multiple Live Births             # Outcome Date GA Lbr Connor/2nd Weight Sex Type Anes PTL Lv   2 Term            1 Term              Current Medications[2]    Review of Systems:  General: No fever, chills.  Chest: No chest pain, shortness of breath, or palpitations.  Breast: No pain, masses, or nipple  "discharge.  Vulva: No pain, lesions, or itching.  Vagina: No relaxation, itching, discharge, or lesions.  Abdomen: No pain, nausea, vomiting, diarrhea, or constipation.  Urinary: No incontinence, nocturia, frequency, or dysuria.  Extremities:  No leg cramps, edema, or calf pain.  Neurologic: No headaches, dizziness, or visual changes.    Objective:     Vitals:    03/24/25 1144   Weight: 99.3 kg (219 lb)   Height: 5' 4" (1.626 m)     Body mass index is 37.59 kg/m².    PHYSICAL EXAM:  APPEARANCE: Well nourished, well developed, in no acute distress.  AFFECT: WNL, alert and oriented x 3      Assessment:      Pre-diabetes    Obesity, unspecified class, unspecified obesity type, unspecified whether serious comorbidity present  -     semaglutide, weight loss, 1 mg/0.5 mL PnIj; Inject 1 mg into the skin every 7 days.    Metabolic dysfunction-associated steatotic liver disease (MASLD)        Plan:   Continue low glycemic diet with lean protein at each meal.  Maintain hydration.  Continue doing PT for back.  Increase compounded semaglutide to 1.0mg SC weekly- faxed to [facility].  We did discuss compounded tirzepatide in the future if needed.  Follow up in 3 months    Instructed patient to call if she experiences any side effects or has any questions.    I spent a total of 15 minutes on the day of the visit.This includes face to face time and non-face to face time preparing to see the patient (eg, review of tests), obtaining and/or reviewing separately obtained history, documenting clinical information in the electronic or other health record, independently interpreting results and communicating results to the patient/family/caregiver, or care coordinator.          [1]   Social History  Tobacco Use    Smoking status: Never    Smokeless tobacco: Never   Substance Use Topics    Alcohol use: Yes     Comment: rarely, socially, few times a year max     Drug use: No   [2]   Current Outpatient Medications:     ALPRAZolam (XANAX) 0.25 MG " tablet, Take 1 tablet (0.25 mg total) by mouth nightly as needed for Anxiety., Disp: 30 tablet, Rfl: 0    EScitalopram oxalate (LEXAPRO) 20 MG tablet, Take 1 tablet (20 mg total) by mouth once daily., Disp: 30 tablet, Rfl: 11    metoprolol succinate (TOPROL-XL) 25 MG 24 hr tablet, Take 1 tablet (25 mg total) by mouth once daily., Disp: 90 tablet, Rfl: 3    semaglutide, weight loss, 1 mg/0.5 mL PnIj, Inject 1 mg into the skin every 7 days., Disp: , Rfl:     traZODone (DESYREL) 50 MG tablet, Take 1-2 tablets ( mg total) by mouth nightly as needed for Insomnia., Disp: 60 tablet, Rfl: 3

## 2025-04-30 DIAGNOSIS — I10 ESSENTIAL HYPERTENSION: ICD-10-CM

## 2025-05-07 ENCOUNTER — PATIENT MESSAGE (OUTPATIENT)
Dept: OBSTETRICS AND GYNECOLOGY | Facility: CLINIC | Age: 45
End: 2025-05-07
Payer: COMMERCIAL

## 2025-05-07 DIAGNOSIS — E66.9 OBESITY, UNSPECIFIED CLASS, UNSPECIFIED OBESITY TYPE, UNSPECIFIED WHETHER SERIOUS COMORBIDITY PRESENT: Primary | ICD-10-CM

## 2025-05-23 ENCOUNTER — OCCUPATIONAL HEALTH (OUTPATIENT)
Dept: URGENT CARE | Facility: CLINIC | Age: 45
End: 2025-05-23

## 2025-05-23 DIAGNOSIS — Z00.00 ENCOUNTER FOR PHYSICAL EXAMINATION: Primary | ICD-10-CM

## 2025-05-23 LAB
OHS QRS DURATION: 96 MS
OHS QTC CALCULATION: 413 MS

## 2025-05-24 ENCOUNTER — PATIENT OUTREACH (OUTPATIENT)
Facility: OTHER | Age: 45
End: 2025-05-24
Payer: COMMERCIAL

## 2025-05-24 ENCOUNTER — TELEPHONE (OUTPATIENT)
Dept: URGENT CARE | Facility: CLINIC | Age: 45
End: 2025-05-24
Payer: COMMERCIAL

## 2025-05-24 ENCOUNTER — OCHSNER VIRTUAL EMERGENCY DEPARTMENT (OUTPATIENT)
Facility: CLINIC | Age: 45
End: 2025-05-24
Payer: COMMERCIAL

## 2025-05-24 DIAGNOSIS — E87.5 SERUM POTASSIUM ELEVATED: Primary | ICD-10-CM

## 2025-05-24 NOTE — TELEPHONE ENCOUNTER
Out going call: Pt called and informed of elevated Potassium level of 7.5 today and advised to go to nearest Emergency Room to recheck Potassium blood level. Pt agreed with plan and is leaving to go to ER now in [facility].      Pt was presented to [facility] and Yevgeniy    Hello, I received a Call from Esa from [employer] about reaching someone on call for Occupational Health, (not able to reach anyone) to report a Critical Lab. Zo Martínez seen at [facility] received a physical with lab work. Esa from [employer] called about a Critical Potassium of 7.5, I am at [facility] today and took the message. I plan to call pt to inform of going to nearest ER to recheck blood level.

## 2025-05-24 NOTE — PROGRESS NOTES
Patient seen by Graciela Sumner DNP at urgent care on 5/24/25 for c/o critical potassium lab; [facility] consulted.     [facility] Provider Dr Cristofer Perez disposition recommendation patient to go to ED.    Follow up scheduled for 5/25/25 to assess for additional needs.

## 2025-05-24 NOTE — PLAN OF CARE-OVED
[facility] Emergency Department Plan of Care Note  Referral Source: Primary Care Provider                               Chief Complaint   Patient presents with    Hyperkalemia     K of 7.5       Recommendation: Emergency Department

## 2025-05-25 ENCOUNTER — PATIENT OUTREACH (OUTPATIENT)
Facility: OTHER | Age: 45
End: 2025-05-25
Payer: COMMERCIAL

## 2025-05-25 NOTE — PROGRESS NOTES
Patient returned call stating that all her concerns were addressed in the ED and she has no needs at this time.

## 2025-05-25 NOTE — PROGRESS NOTES
Patient was seen in the Emergency Department on 5/24/25. The Emergency Department After Visit Summary instructs the patient to follow up with primary care as needed. Two follow-up calls were made to assess for additional needs, but there was no answer. Two messages were left requesting a return call. Assistance will be provided if the patient returns the call.

## 2025-05-26 ENCOUNTER — TELEPHONE (OUTPATIENT)
Dept: URGENT CARE | Facility: CLINIC | Age: 45
End: 2025-05-26
Payer: COMMERCIAL

## 2025-05-26 DIAGNOSIS — Z00.00 ANNUAL PHYSICAL EXAM: Primary | ICD-10-CM

## 2025-05-26 NOTE — TELEPHONE ENCOUNTER
Results of employment physical blood work. abnormal CMP and Lipid Panel, non-occupationally related. CBC was unable to be performed due to clotting. CMP was affected by hemolysis. She was contacted over the weekend about critical potassium result and advised to present to ED. Potassium was found to be normal. Follow-up with PCP.

## 2025-06-30 ENCOUNTER — OFFICE VISIT (OUTPATIENT)
Dept: OBSTETRICS AND GYNECOLOGY | Facility: CLINIC | Age: 45
End: 2025-06-30
Payer: COMMERCIAL

## 2025-06-30 VITALS — WEIGHT: 208 LBS | HEIGHT: 64 IN | BODY MASS INDEX: 35.51 KG/M2

## 2025-06-30 DIAGNOSIS — K76.0 METABOLIC DYSFUNCTION-ASSOCIATED STEATOTIC LIVER DISEASE (MASLD): Primary | ICD-10-CM

## 2025-06-30 DIAGNOSIS — E66.9 OBESITY, UNSPECIFIED CLASS, UNSPECIFIED OBESITY TYPE, UNSPECIFIED WHETHER SERIOUS COMORBIDITY PRESENT: ICD-10-CM

## 2025-06-30 PROCEDURE — 98005 SYNCH AUDIO-VIDEO EST LOW 20: CPT | Mod: 95,,, | Performed by: PHYSICIAN ASSISTANT

## 2025-06-30 PROCEDURE — 1160F RVW MEDS BY RX/DR IN RCRD: CPT | Mod: CPTII,95,, | Performed by: PHYSICIAN ASSISTANT

## 2025-06-30 PROCEDURE — 1159F MED LIST DOCD IN RCRD: CPT | Mod: CPTII,95,, | Performed by: PHYSICIAN ASSISTANT

## 2025-06-30 NOTE — PROGRESS NOTES
The patient location is: [place]  The chief complaint leading to consultation is: follow up weight loss    Visit type: audiovisual    Face to Face time with patient: 10 minutes  20 minutes of total time spent on the encounter, which includes face to face time and non-face to face time preparing to see the patient (eg, review of tests), Obtaining and/or reviewing separately obtained history, Documenting clinical information in the electronic or other health record, Independently interpreting results (not separately reported) and communicating results to the patient/family/caregiver, or Care coordination (not separately reported).         Each patient to whom he or she provides medical services by telemedicine is:  (1) informed of the relationship between the physician and patient and the respective role of any other health care provider with respect to management of the patient; and (2) notified that he or she may decline to receive medical services by telemedicine and may withdraw from such care at any time.    Notes:   Subjective:      Zo Martínez is a 45 y.o. female with history of MASLD  and prediabetes who presents for weight loss follow up. She is taking compounded semaglutide 1.7mg SC weekly. Seeing weight loss but slow.  Reports good appetite control with increased dose. Denies bothersome side effects. Admits to skipping meals and knows she needs to increase her protein. Her back pain has improved. No formal exercise. Questions about supplements.    Routine Screening Labs: 5/23/2025    Admission on 05/24/2025, Discharged on 05/24/2025   Component Date Value Ref Range Status    Sodium 05/24/2025 139  136 - 145 mmol/L Final    Potassium 05/24/2025 4.1  3.5 - 5.1 mmol/L Final    Chloride 05/24/2025 104  95 - 110 mmol/L Final    CO2 05/24/2025 27  23 - 29 mmol/L Final    Glucose 05/24/2025 109 (H)  74 - 106 mg/dL Final    BUN 05/24/2025 20 (H)  7 - 17 mg/dL Final    Creatinine 05/24/2025 0.82  0.70 - 1.20  mg/dL Final    Calcium 05/24/2025 9.0  8.4 - 10.2 mg/dL Final    Total Protein 05/24/2025 7.7  6.3 - 8.2 g/dL Final    Albumin 05/24/2025 4.4  3.5 - 5.2 g/dL Final    Total Bilirubin 05/24/2025 0.3  0.2 - 1.3 mg/dL Final    Alkaline Phosphatase 05/24/2025 128  38 - 145 U/L Final    AST 05/24/2025 27  14 - 36 U/L Final    ALT 05/24/2025 21  10 - 44 U/L Final    Anion Gap 05/24/2025 8  8 - 16 mmol/L Final    eGFR 05/24/2025 >60  >60 mL/min/1.73 m^2 Final   Occupational Health on 05/23/2025   Component Date Value Ref Range Status    QRS Duration 05/23/2025 96  ms Final    [facility] QTC Calculation 05/23/2025 413  ms Final       Past Medical History:   Diagnosis Date    Anxiety     Family history of breast cancer in mother 04/18/2017    Family history of colon cancer: father age 55 04/18/2017    Fatty liver 09/06/2019    Gestational diabetes mellitus     Obesity, Class I, BMI 30-34.9     Pre-diabetes 04/18/2017    Severe obesity (BMI 35.0-35.9 with comorbidity) 09/19/2017    Tourette's syndrome     x- have subsided    Transient elevated blood pressure     Vertebrogenic low back pain      Past Surgical History:   Procedure Laterality Date    CHOLECYSTECTOMY      DILATION AND CURETTAGE OF UTERUS      EYE SURGERY Bilateral 11/2022    lasik     Social History[1]  Family History   Problem Relation Name Age of Onset    Heart disease Mother          stenting    Diabetes Mother      Cancer Mother  63        breast, also vaginal    Hypertension Mother      Hyperlipidemia Mother      Breast cancer Mother      Heart disease Father          CHF    Hypertension Father      Cancer Father          colon    Diabetes Sister Manav     Heart disease Sister Manav         stents    Diabetes Sister Alexandra     Heart disease Daughter Dayna         Tetralogy    Down syndrome Daughter Dayna     Breast cancer Maternal Aunt      Liver cancer Maternal Uncle      Stroke Maternal Grandmother      Heart disease Maternal Grandfather      Heart disease  "Paternal Grandmother          MI    Diabetes Brother      No Known Problems Son Seamus     Cirrhosis Neg Hx       OB History    Para Term  AB Living   2 2 2      SAB IAB Ectopic Multiple Live Births             # Outcome Date GA Lbr Connor/2nd Weight Sex Type Anes PTL Lv   2 Term            1 Term              Current Medications[2]    Review of Systems:  General: No fever, chills.  Chest: No chest pain, shortness of breath, or palpitations.  Breast: No pain, masses, or nipple discharge.  Vulva: No pain, lesions, or itching.  Vagina: No relaxation, itching, discharge, or lesions.  Abdomen: No pain, nausea, vomiting, diarrhea, or constipation.  Urinary: No incontinence, nocturia, frequency, or dysuria.  Extremities:  No leg cramps, edema, or calf pain.  Neurologic: No headaches, dizziness, or visual changes.    Objective:     Vitals:    25 1137   Weight: 94.3 kg (208 lb)   Height: 5' 4" (1.626 m)     Body mass index is 35.7 kg/m².    PHYSICAL EXAM:  APPEARANCE: Well nourished, well developed, in no acute distress.  AFFECT: WNL, alert and oriented x 3      Assessment:      Metabolic dysfunction-associated steatotic liver disease (MASLD)    Obesity, unspecified class, unspecified obesity type, unspecified whether serious comorbidity present  -     semaglutide, weight loss, 1.7 mg/0.75 mL PnIj; With methylcobalamin for fatigue. Patient injects 1.7mg SC weekly.  Dispense: 2 mL; Refill: 3        Plan:   Continue low glycemic diet with lean protein at each meal.  Stressed importance of not skipping meals.   Goal of 100-110g protein daily  Maintain hydration.  Continue compounded semaglutide 1.7mg SC weekly (johns)  Reviewed benefits of strength workouts as can tolerate.  Discussed mg glycinate, vitamin d and omega3.  Follow up in 3 months or sooner PRN    Instructed patient to call if she experiences any side effects or has any questions.    I spent a total of 20 minutes on the day of the visit.This includes " face to face time and non-face to face time preparing to see the patient (eg, review of tests), obtaining and/or reviewing separately obtained history, documenting clinical information in the electronic or other health record, independently interpreting results and communicating results to the patient/family/caregiver, or care coordinator.                                [1]   Social History  Tobacco Use    Smoking status: Never    Smokeless tobacco: Never   Substance Use Topics    Alcohol use: Yes     Comment: rarely, socially, few times a year max     Drug use: No   [2]   Current Outpatient Medications:     ALPRAZolam (XANAX) 0.25 MG tablet, Take 1 tablet (0.25 mg total) by mouth nightly as needed for Anxiety., Disp: 30 tablet, Rfl: 0    EScitalopram oxalate (LEXAPRO) 20 MG tablet, Take 1 tablet (20 mg total) by mouth once daily., Disp: 30 tablet, Rfl: 11    metoprolol succinate (TOPROL-XL) 25 MG 24 hr tablet, Take 1 tablet (25 mg total) by mouth once daily., Disp: 90 tablet, Rfl: 3    semaglutide, weight loss, 1.7 mg/0.75 mL PnIj, With methylcobalamin for fatigue. Patient injects 1.7mg SC weekly., Disp: 2 mL, Rfl: 3    traZODone (DESYREL) 50 MG tablet, Take 1-2 tablets ( mg total) by mouth nightly as needed for Insomnia. (Patient not taking: Reported on 5/23/2025), Disp: 60 tablet, Rfl: 3

## 2025-07-08 NOTE — PROGRESS NOTES
- Discussed with patient and caregiver that today's physical exam is consistent with flareup of primary osteoarthritis of the left knee  -Patient was provided with Kenalog and lidocaine injection into the left knee for symptomatic relief of pain and inflammation  -Patient tolerated treatment well  -Continue activity as tolerated  -Discussed with caregiver that if patient continues to be symptomatic, we can reexamine and consider for pes anserine bursa injection at time of next visit  -Caregiver expresses understanding and is in agreement with this treatment plan  Orders:    Large joint arthrocentesis: L knee     The patient location is: home  The chief complaint leading to consultation is: weight loss consult    Visit type: audiovisual    Face to Face time with patient: 20 minutes  30 minutes of total time spent on the encounter, which includes face to face time and non-face to face time preparing to see the patient (eg, review of tests), Obtaining and/or reviewing separately obtained history, Documenting clinical information in the electronic or other health record, Independently interpreting results (not separately reported) and communicating results to the patient/family/caregiver, or Care coordination (not separately reported).         Each patient to whom he or she provides medical services by telemedicine is:  (1) informed of the relationship between the physician and patient and the respective role of any other health care provider with respect to management of the patient; and (2) notified that he or she may decline to receive medical services by telemedicine and may withdraw from such care at any time.    Notes:    Subjective:      Zo Martínez is a 44 y.o. female who presents to discuss weight loss.  History of fatty liver and would like to lose weight to improve health. She has been on compounded semaglutide and tirzepatide. She had best success with compounded semaglutide. Stopped semaglutide in 11/2024 and gained most of weight back. Does not think that her insurance covers GLP-1. She is prediabetic with recent A1c of 6.0.  Periods are irregular. She is not on contraception.     Routine screening labs: 8/2/2023    Sleep: up and down through the night     Stress: High. Has special needs daughter and working full time    Exercise: on and off with [employer]     A typical day consists of:  Breakfast: 2 cups of iced coffee with milk with protein in coffee  Lunch: chicken sandwich with fries or leftovers- on the go  Dinner: usually cooks- protein and vegetable  Snack: not often; ice cream cravings in the  evenings  Beverages: water, iced tea. Alcohol- none      Lab Visit on 10/28/2024   Component Date Value Ref Range Status    Total Protein 10/28/2024 8.2  6.3 - 8.2 g/dL Final    Albumin 10/28/2024 4.4  3.5 - 5.2 g/dL Final    Total Bilirubin 10/28/2024 0.4  0.2 - 1.3 mg/dL Final    AST 10/28/2024 37 (H)  14 - 36 U/L Final    ALT 10/28/2024 31  10 - 44 U/L Final    Alkaline Phosphatase 10/28/2024 128  38 - 145 U/L Final    Bilirubin, Direct 10/28/2024 0.2  0.0 - 0.3 mg/dL Final    Hemoglobin A1C 10/28/2024 6.0 (H)  4.0 - 5.6 % Final       Past Medical History:   Diagnosis Date    Anxiety     Family history of breast cancer in mother 4/18/2017    Family history of colon cancer: father age 55 4/18/2017    Fatty liver 9/6/2019    Gestational diabetes mellitus     Obesity, Class I, BMI 30-34.9     Pre-diabetes 4/18/2017    Severe obesity (BMI 35.0-35.9 with comorbidity) 9/19/2017    Tourette's syndrome     x- have subsided    Transient elevated blood pressure      Past Surgical History:   Procedure Laterality Date    CHOLECYSTECTOMY      DILATION AND CURETTAGE OF UTERUS      EYE SURGERY Bilateral 11/2022    lasik     Social History     Tobacco Use    Smoking status: Never    Smokeless tobacco: Never   Substance Use Topics    Alcohol use: Yes     Comment: rarely, socially, few times a year max     Drug use: No     Family History   Problem Relation Name Age of Onset    Heart disease Mother          stenting    Diabetes Mother      Cancer Mother  63        breast, also vaginal    Hypertension Mother      Hyperlipidemia Mother      Breast cancer Mother      Heart disease Father          CHF    Hypertension Father      Cancer Father          colon    Diabetes Sister Manav     Heart disease Sister Manav         stents    Diabetes Sister Alexandra     Heart disease Daughter Dayna         Tetralogy    Down syndrome Daughter Dayna     Breast cancer Maternal Aunt      Liver cancer Maternal Uncle      Stroke Maternal Grandmother       "Heart disease Maternal Grandfather      Heart disease Paternal Grandmother          MI    Diabetes Brother      No Known Problems Son Seamus     Cirrhosis Neg Hx       OB History    Para Term  AB Living   2 2 2         SAB IAB Ectopic Multiple Live Births                  # Outcome Date GA Lbr Connor/2nd Weight Sex Type Anes PTL Lv   2 Term            1 Term                Current Outpatient Medications:     ALPRAZolam (XANAX) 0.25 MG tablet, Take 1 tablet (0.25 mg total) by mouth nightly as needed for Anxiety., Disp: 30 tablet, Rfl: 0    EScitalopram oxalate (LEXAPRO) 20 MG tablet, Take 1 tablet (20 mg total) by mouth once daily., Disp: 30 tablet, Rfl: 11    medroxyPROGESTERone (PROVERA) 10 MG tablet, Take 10 mg by mouth., Disp: , Rfl:     metoprolol succinate (TOPROL-XL) 25 MG 24 hr tablet, Take 1 tablet (25 mg total) by mouth once daily., Disp: 90 tablet, Rfl: 3    semaglutide (OZEMPIC) 0.25 mg or 0.5 mg (2 mg/3 mL) pen injector, Inject 0.25 mg into the skin every 7 days., Disp: 3 mL, Rfl: 3    traZODone (DESYREL) 50 MG tablet, Take 1-2 tablets ( mg total) by mouth nightly as needed for Insomnia., Disp: 60 tablet, Rfl: 3    Review of Systems:  General: No fever, chills, or weight loss.  Chest: No chest pain, shortness of breath, or palpitations.  Breast: No pain, masses, or nipple discharge.  Vulva: No pain, lesions, or itching.  Vagina: No relaxation, itching, discharge, or lesions.  Abdomen: No pain, nausea, vomiting, diarrhea, or constipation.  Urinary: No incontinence, nocturia, frequency, or dysuria.  Extremities:  No leg cramps, edema, or calf pain.  Neurologic: No headaches, dizziness, or visual changes.    Objective:     Vitals:    25 0912   Weight: 98.9 kg (218 lb)   Height: 5' 4" (1.626 m)     Body mass index is 37.42 kg/m².    PHYSICAL EXAM:  APPEARANCE: Well nourished, well developed, in no acute distress.  AFFECT: WNL, alert and oriented x 3  SKIN: No acne or hirsutism  CHEST: " Good respiratory effect    Assessment:    Metabolic dysfunction-associated steatotic liver disease (MASLD)  -     semaglutide (OZEMPIC) 0.25 mg or 0.5 mg (2 mg/3 mL) pen injector; Inject 0.25 mg into the skin every 7 days.  Dispense: 3 mL; Refill: 3    Pre-diabetes  -     semaglutide (OZEMPIC) 0.25 mg or 0.5 mg (2 mg/3 mL) pen injector; Inject 0.25 mg into the skin every 7 days.  Dispense: 3 mL; Refill: 3    Obesity, unspecified class, unspecified obesity type, unspecified whether serious comorbidity present      BMR calculated at 1614 calories per day.  Plan:   Goal is to shift body composition to increase lean muscle to improve metabolism and protect from osteoporosis.   Developed meal plan with handout of preferred foods.  Encouraged lean protein with every meal.  Goal of  100-110g of protein per day  Wide variety of fruits and vegetables.   Log in josh with goal of 6840-3654 calories a day  Strength workouts 3x per week  Start Ozempic 0.25mg SC qweekly. Counseled on use.  Reviewed side effects and risks of medications. No family or personal history of thyroid cancer or pancreatitis. Denies underlying gallbladder issues.  Discussed that these are long term options for weight loss and risk of gaining weight back if discontinued   Discussed that she should avoid pregnancy while on these medications.  If not covered by insurance, we did discuss the option of compounded medications.  Reviewed the differences between Wegovy/Ozempic and compounded Semaglutide-L-methylcobalamin and that these medications are not FDA regulated.  Follow up in 8 weeks.    Instructed patient to call if she experiences any side effects or has any questions.    I spent a total of 30 minutes on the day of the visit.This includes face to face time and non-face to face time preparing to see the patient (eg, review of tests), obtaining and/or reviewing separately obtained history, documenting clinical information in the electronic or other health  record, independently interpreting results and communicating results to the patient/family/caregiver, or care coordinator.

## 2025-07-31 ENCOUNTER — PATIENT MESSAGE (OUTPATIENT)
Dept: OBSTETRICS AND GYNECOLOGY | Facility: CLINIC | Age: 45
End: 2025-07-31
Payer: COMMERCIAL

## 2025-07-31 DIAGNOSIS — K76.0 METABOLIC DYSFUNCTION-ASSOCIATED STEATOTIC LIVER DISEASE (MASLD): ICD-10-CM

## 2025-07-31 DIAGNOSIS — E66.9 OBESITY, UNSPECIFIED CLASS, UNSPECIFIED OBESITY TYPE, UNSPECIFIED WHETHER SERIOUS COMORBIDITY PRESENT: Primary | ICD-10-CM
